# Patient Record
Sex: MALE | Race: WHITE | Employment: OTHER | URBAN - METROPOLITAN AREA
[De-identification: names, ages, dates, MRNs, and addresses within clinical notes are randomized per-mention and may not be internally consistent; named-entity substitution may affect disease eponyms.]

---

## 2017-01-09 ENCOUNTER — OFFICE VISIT (OUTPATIENT)
Dept: ORTHOPEDIC SURGERY | Age: 77
End: 2017-01-09

## 2017-01-09 VITALS — RESPIRATION RATE: 16 BRPM | HEIGHT: 73 IN | WEIGHT: 180 LBS | BODY MASS INDEX: 23.86 KG/M2

## 2017-01-09 DIAGNOSIS — S52.501A: Primary | ICD-10-CM

## 2017-01-09 DIAGNOSIS — R52 PAIN: ICD-10-CM

## 2017-01-09 PROCEDURE — 73110 X-RAY EXAM OF WRIST: CPT | Performed by: ORTHOPAEDIC SURGERY

## 2017-01-09 PROCEDURE — 99024 POSTOP FOLLOW-UP VISIT: CPT | Performed by: ORTHOPAEDIC SURGERY

## 2017-01-09 RX ORDER — AZITHROMYCIN 250 MG/1
TABLET, FILM COATED ORAL
COMMUNITY
Start: 2017-01-06 | End: 2017-02-22 | Stop reason: ALTCHOICE

## 2017-01-11 ENCOUNTER — HOSPITAL ENCOUNTER (OUTPATIENT)
Dept: OCCUPATIONAL THERAPY | Age: 77
Discharge: HOME | End: 2017-01-31
Attending: ORTHOPAEDIC SURGERY | Admitting: ORTHOPAEDIC SURGERY

## 2017-01-13 ENCOUNTER — HOSPITAL ENCOUNTER (OUTPATIENT)
Dept: OCCUPATIONAL THERAPY | Age: 77
Discharge: HOME | End: 2017-01-13
Admitting: ORTHOPAEDIC SURGERY

## 2017-01-16 ENCOUNTER — HOSPITAL ENCOUNTER (OUTPATIENT)
Dept: OCCUPATIONAL THERAPY | Age: 77
Discharge: HOME | End: 2017-01-16
Admitting: ORTHOPAEDIC SURGERY

## 2017-01-20 ENCOUNTER — HOSPITAL ENCOUNTER (OUTPATIENT)
Dept: OCCUPATIONAL THERAPY | Age: 77
Discharge: HOME | End: 2017-01-20
Admitting: ORTHOPAEDIC SURGERY

## 2017-01-24 ENCOUNTER — HOSPITAL ENCOUNTER (OUTPATIENT)
Dept: OCCUPATIONAL THERAPY | Age: 77
Discharge: HOME | End: 2017-01-24
Admitting: ORTHOPAEDIC SURGERY

## 2017-01-27 ENCOUNTER — HOSPITAL ENCOUNTER (OUTPATIENT)
Dept: OCCUPATIONAL THERAPY | Age: 77
Discharge: HOME | End: 2017-01-27
Admitting: ORTHOPAEDIC SURGERY

## 2017-01-30 ENCOUNTER — HOSPITAL ENCOUNTER (OUTPATIENT)
Dept: OCCUPATIONAL THERAPY | Age: 77
Discharge: HOME | End: 2017-01-30
Admitting: ORTHOPAEDIC SURGERY

## 2017-02-01 ENCOUNTER — HOSPITAL ENCOUNTER (OUTPATIENT)
Dept: OTHER | Age: 77
Discharge: HOME | End: 2017-02-28
Attending: ORTHOPAEDIC SURGERY | Admitting: ORTHOPAEDIC SURGERY

## 2017-02-02 ENCOUNTER — HOSPITAL ENCOUNTER (OUTPATIENT)
Dept: OCCUPATIONAL THERAPY | Age: 77
Discharge: HOME | End: 2017-02-02
Admitting: ORTHOPAEDIC SURGERY

## 2017-02-06 ENCOUNTER — HOSPITAL ENCOUNTER (OUTPATIENT)
Dept: OCCUPATIONAL THERAPY | Age: 77
Discharge: HOME | End: 2017-02-06
Admitting: ORTHOPAEDIC SURGERY

## 2017-02-10 ENCOUNTER — HOSPITAL ENCOUNTER (OUTPATIENT)
Dept: OCCUPATIONAL THERAPY | Age: 77
Discharge: HOME | End: 2017-02-10
Admitting: ORTHOPAEDIC SURGERY

## 2017-02-13 ENCOUNTER — HOSPITAL ENCOUNTER (OUTPATIENT)
Dept: OCCUPATIONAL THERAPY | Age: 77
Discharge: HOME | End: 2017-02-13
Admitting: ORTHOPAEDIC SURGERY

## 2017-02-17 ENCOUNTER — HOSPITAL ENCOUNTER (OUTPATIENT)
Dept: OCCUPATIONAL THERAPY | Age: 77
Discharge: HOME | End: 2017-02-17
Admitting: ORTHOPAEDIC SURGERY

## 2017-02-21 ENCOUNTER — HOSPITAL ENCOUNTER (OUTPATIENT)
Dept: OCCUPATIONAL THERAPY | Age: 77
Discharge: HOME | End: 2017-02-21
Admitting: ORTHOPAEDIC SURGERY

## 2017-02-22 ENCOUNTER — HOSPITAL ENCOUNTER (OUTPATIENT)
Dept: GENERAL RADIOLOGY | Age: 77
Discharge: HOME | End: 2017-02-22
Attending: ORTHOPAEDIC SURGERY | Admitting: ORTHOPAEDIC SURGERY

## 2017-02-22 ENCOUNTER — OFFICE VISIT (OUTPATIENT)
Dept: ORTHOPEDIC SURGERY | Age: 77
End: 2017-02-22

## 2017-02-22 VITALS — WEIGHT: 180 LBS | RESPIRATION RATE: 16 BRPM | BODY MASS INDEX: 23.86 KG/M2 | HEIGHT: 73 IN

## 2017-02-22 DIAGNOSIS — S52.501A: Primary | ICD-10-CM

## 2017-02-22 DIAGNOSIS — R52 PAIN: ICD-10-CM

## 2017-02-22 PROCEDURE — 99024 POSTOP FOLLOW-UP VISIT: CPT | Performed by: ORTHOPAEDIC SURGERY

## 2017-02-22 RX ORDER — PREDNISONE 1 MG/1
5 TABLET ORAL DAILY
COMMUNITY
Start: 2017-02-17

## 2017-03-01 ENCOUNTER — HOSPITAL ENCOUNTER (OUTPATIENT)
Dept: OTHER | Age: 77
Discharge: HOME | End: 2017-03-31
Attending: ORTHOPAEDIC SURGERY | Admitting: ORTHOPAEDIC SURGERY

## 2017-07-27 ENCOUNTER — HOSPITAL ENCOUNTER (OUTPATIENT)
Dept: NEUROLOGY | Age: 77
Discharge: HOME | End: 2017-07-27
Attending: PHYSICIAN ASSISTANT | Admitting: PHYSICIAN ASSISTANT

## 2017-11-30 ENCOUNTER — HOSPITAL ENCOUNTER (OUTPATIENT)
Dept: OTHER | Age: 77
Discharge: HOME | End: 2017-11-30
Attending: CLINIC/CENTER | Admitting: CLINIC/CENTER

## 2017-12-03 LAB
CULTURE: NORMAL
REPORT STATUS: NORMAL
REQUEST PROBLEM: NORMAL
SPECIMEN: NORMAL

## 2018-01-31 ENCOUNTER — OFFICE VISIT (OUTPATIENT)
Dept: SURGERY | Age: 78
End: 2018-01-31

## 2018-01-31 VITALS
BODY MASS INDEX: 26.52 KG/M2 | SYSTOLIC BLOOD PRESSURE: 130 MMHG | RESPIRATION RATE: 16 BRPM | WEIGHT: 201 LBS | OXYGEN SATURATION: 95 % | DIASTOLIC BLOOD PRESSURE: 80 MMHG | TEMPERATURE: 98.5 F | HEART RATE: 62 BPM

## 2018-01-31 DIAGNOSIS — K40.31 RECURRENT INGUINAL HERNIA OF RIGHT SIDE WITH OBSTRUCTION AND WITHOUT GANGRENE: Primary | ICD-10-CM

## 2018-01-31 PROCEDURE — 99204 OFFICE O/P NEW MOD 45 MIN: CPT | Performed by: SURGERY

## 2018-01-31 ASSESSMENT — ENCOUNTER SYMPTOMS
SHORTNESS OF BREATH: 0
COLOR CHANGE: 0
VOICE CHANGE: 0
ABDOMINAL PAIN: 0
WHEEZING: 0
BLOOD IN STOOL: 0
CONSTIPATION: 0
VOMITING: 0
ABDOMINAL DISTENTION: 0
RECTAL PAIN: 0
ANAL BLEEDING: 0
NAUSEA: 0
DIARRHEA: 0
COUGH: 0

## 2018-01-31 NOTE — PROGRESS NOTES
Jared Seymour   YOB: 1940    Date of Visit:  1/31/2018    Referring provider:    Pritesh Caldwell MD    Chief Complaint   Patient presents with    Surgical Consult     [place]       HPI:   Hernia: Patient is 65y.o. year old male seen at request of Pritesh Caldwell MD.  Patient presents for evaluation of  right inguinal hernia. Symptoms were first noted 6 weeks ago. Pain is sharp. Lump is reducible. Pt has no symptoms of  chronic constipation, chronic cough, difficulty urinating. Pt. does not have previous history of prior hernia surgery. Patient has a history of a laparoscopic right inguinal hernia repair with mesh 12-15 years ago. No Known Allergies  Outpatient Prescriptions Marked as Taking for the 1/31/18 encounter (Office Visit) with Macario Yousif,    Medication Sig Dispense Refill    predniSONE (DELTASONE) 5 MG tablet       finasteride (PROSCAR) 5 MG tablet       tamsulosin (FLOMAX) 0.4 MG capsule       timolol (TIMOPTIC) 0.5 % ophthalmic solution       amLODIPine (NORVASC) 5 MG tablet Take 1 tablet by mouth daily. 30 tablet 11    warfarin (COUMADIN) 1 MG tablet 5mg alternate with 7.5 mg 30 tablet 11    allopurinol (ZYLOPRIM) 100 MG tablet Take 100 mg by mouth daily.  celecoxib (CELEBREX) 100 MG capsule Take 100 mg by mouth 2 times daily.  albuterol (PROVENTIL HFA;VENTOLIN HFA) 108 (90 BASE) MCG/ACT inhaler Inhale 2 puffs into the lungs every 6 hours as needed.  pravastatin (PRAVACHOL) 10 MG tablet Take 10 mg by mouth daily. Past Medical History:   Diagnosis Date    DVT (deep venous thrombosis) (HCC)     Gout     Hypertension      Past Surgical History:   Procedure Laterality Date    ABDOMEN SURGERY      HERNIA REPAIR       History reviewed. No pertinent family history.   Social History     Social History    Marital status:      Spouse name: N/A    Number of children: N/A    Years of education: N/A     Occupational History   

## 2018-02-21 ENCOUNTER — TELEPHONE (OUTPATIENT)
Dept: SURGERY | Age: 78
End: 2018-02-21

## 2018-02-21 NOTE — ANESTHESIA PRE-OP
Inguinal Hernia Repair per Dr. Severo Clark. 1.  HTN, HLD, VHD: mild MR. EKG: SB, HR 57. Hx right leg DVT,1980's, 2002. Evaluated by Hematology. On coumadin, last dose 2/21/2018. Echo 2012, EF 55%. Denies CP or SOB. Walks 5 miles per day. 2.  Former smoker 1 ppd x 29 years, quit 1984. Able to lie flat. 3.  Controlled reflux, on PPI. 4.  BPH, slow urine stream, on flomax and proscar. 5.  Hx anemia. HGB: 13.0, HCT: 39.3. 6.  Gout. On allopurinol. Left CTR, 2001. Fall, with closed traumatic nondisplaced fracture of distal end of right radius, 1/2016. Osteoarthritis right wrist.    7.  Right hernia repair with mesh, 2000's. Recurrent right hernia. 8.  Rheumatoid arthritis 2015. On plaquenil, celebrex, and steroid. Followed by Dr. Charlee Keane. Anesthesia Evaluation will follow by a certified practitioner prior to surgery.     Electronically signed by Tiff Lowe NP on 2/21/2018 at 9:04 AM

## 2018-02-21 NOTE — TELEPHONE ENCOUNTER
Spoke with Gilberto at [facility] whom stated that no Pre-cert is required for procedure [phone] scheduled for 2/28 at [address] [place] [address].  Ref#[id number]

## 2018-02-22 ENCOUNTER — HOSPITAL ENCOUNTER (OUTPATIENT)
Dept: PREADMISSION TESTING | Age: 78
Discharge: HOME | End: 2018-02-22
Attending: SURGERY | Admitting: SURGERY

## 2018-02-22 VITALS
WEIGHT: 200 LBS | HEIGHT: 71 IN | BODY MASS INDEX: 28 KG/M2 | TEMPERATURE: 97.8 F | DIASTOLIC BLOOD PRESSURE: 71 MMHG | HEART RATE: 62 BPM | RESPIRATION RATE: 16 BRPM | OXYGEN SATURATION: 96 % | SYSTOLIC BLOOD PRESSURE: 139 MMHG

## 2018-02-22 LAB
ANION GAP SERPL CALCULATED.3IONS-SCNC: 11 MMOL/L (ref 4–16)
BUN BLDV-MCNC: 16 MG/DL (ref 6–23)
CALCIUM SERPL-MCNC: 9.1 MG/DL (ref 8.3–10.6)
CHLORIDE BLD-SCNC: 98 MMOL/L (ref 99–110)
CO2: 26 MMOL/L (ref 21–32)
CREAT SERPL-MCNC: 0.8 MG/DL (ref 0.9–1.3)
EKG ATRIAL RATE: 57 BPM
EKG DIAGNOSIS: NORMAL
EKG P AXIS: 48 DEGREES
EKG P-R INTERVAL: 186 MS
EKG Q-T INTERVAL: 416 MS
EKG QRS DURATION: 94 MS
EKG QTC CALCULATION (BAZETT): 404 MS
EKG R AXIS: 30 DEGREES
EKG T AXIS: -8 DEGREES
EKG VENTRICULAR RATE: 57 BPM
GFR AFRICAN AMERICAN: >60 ML/MIN/1.73M2
GFR NON-AFRICAN AMERICAN: >60 ML/MIN/1.73M2
GLUCOSE BLD-MCNC: 88 MG/DL (ref 70–99)
HCT VFR BLD CALC: 39.3 % (ref 42–52)
HEMOGLOBIN: 13 GM/DL (ref 13.5–18)
MCH RBC QN AUTO: 33 PG (ref 27–31)
MCHC RBC AUTO-ENTMCNC: 33.1 % (ref 32–36)
MCV RBC AUTO: 99.7 FL (ref 78–100)
PDW BLD-RTO: 13.6 % (ref 11.7–14.9)
PLATELET # BLD: 218 K/CU MM (ref 140–440)
PMV BLD AUTO: 9.7 FL (ref 7.5–11.1)
POTASSIUM SERPL-SCNC: 4.5 MMOL/L (ref 3.5–5.1)
RBC # BLD: 3.94 M/CU MM (ref 4.6–6.2)
SODIUM BLD-SCNC: 135 MMOL/L (ref 135–145)
WBC # BLD: 6.2 K/CU MM (ref 4–10.5)

## 2018-02-22 RX ORDER — LANSOPRAZOLE 15 MG/1
15 CAPSULE, DELAYED RELEASE ORAL NIGHTLY
COMMUNITY
End: 2019-06-18

## 2018-02-22 RX ORDER — CELECOXIB 400 MG/1
400 CAPSULE ORAL NIGHTLY
COMMUNITY
End: 2019-06-27 | Stop reason: SDUPTHER

## 2018-02-22 RX ORDER — CEFAZOLIN SODIUM 2 G/100ML
2 INJECTION, SOLUTION INTRAVENOUS ONCE
Status: CANCELLED | OUTPATIENT
Start: 2018-02-28

## 2018-02-28 PROBLEM — K40.91 RECURRENT INGUINAL HERNIA OF RIGHT SIDE WITHOUT OBSTRUCTION OR GANGRENE: Status: ACTIVE | Noted: 2018-02-28

## 2018-03-07 ENCOUNTER — OFFICE VISIT (OUTPATIENT)
Dept: SURGERY | Age: 78
End: 2018-03-07

## 2018-03-07 VITALS
SYSTOLIC BLOOD PRESSURE: 120 MMHG | RESPIRATION RATE: 19 BRPM | OXYGEN SATURATION: 97 % | HEART RATE: 77 BPM | DIASTOLIC BLOOD PRESSURE: 70 MMHG

## 2018-03-07 DIAGNOSIS — Z87.19 S/P INGUINAL HERNIA REPAIR: Primary | ICD-10-CM

## 2018-03-07 DIAGNOSIS — Z98.890 S/P INGUINAL HERNIA REPAIR: Primary | ICD-10-CM

## 2018-03-07 PROCEDURE — 99024 POSTOP FOLLOW-UP VISIT: CPT | Performed by: SURGERY

## 2018-03-27 ENCOUNTER — OFFICE VISIT (OUTPATIENT)
Dept: SURGERY | Age: 78
End: 2018-03-27

## 2018-03-27 VITALS
SYSTOLIC BLOOD PRESSURE: 132 MMHG | WEIGHT: 202.2 LBS | BODY MASS INDEX: 28.2 KG/M2 | OXYGEN SATURATION: 92 % | DIASTOLIC BLOOD PRESSURE: 76 MMHG | RESPIRATION RATE: 18 BRPM | HEART RATE: 72 BPM

## 2018-03-27 DIAGNOSIS — Z87.19 S/P INGUINAL HERNIA REPAIR: Primary | ICD-10-CM

## 2018-03-27 DIAGNOSIS — Z09 POSTOP CHECK: ICD-10-CM

## 2018-03-27 DIAGNOSIS — Z98.890 S/P INGUINAL HERNIA REPAIR: Primary | ICD-10-CM

## 2018-03-27 PROCEDURE — 99024 POSTOP FOLLOW-UP VISIT: CPT | Performed by: SURGERY

## 2018-03-27 NOTE — PROGRESS NOTES
Paula Palomino is 3 weeks post-op: right inguinal hernia. Presenting for routine follow-up. S:  Doing well. Patient has noted no excessive redness and swelling. O:  Wound healing well. A:  Satisfactory course. P:  Patient to return  prn. Patient is to return as needed for redness, swelling, discomfort, or any concern about his  surgery.

## 2018-05-15 PROBLEM — J18.9 PNEUMONIA: Status: ACTIVE | Noted: 2018-05-15

## 2018-05-24 ENCOUNTER — HOSPITAL ENCOUNTER (OUTPATIENT)
Dept: GENERAL RADIOLOGY | Age: 78
Discharge: HOME | End: 2018-05-24
Attending: PHYSICIAN ASSISTANT | Admitting: PHYSICIAN ASSISTANT

## 2018-05-24 DIAGNOSIS — J18.9 PNEUMONIA DUE TO INFECTIOUS ORGANISM, UNSPECIFIED LATERALITY, UNSPECIFIED PART OF LUNG: ICD-10-CM

## 2019-04-08 ENCOUNTER — APPOINTMENT (OUTPATIENT)
Dept: CT IMAGING | Age: 79
End: 2019-04-08
Payer: MEDICARE

## 2019-04-08 ENCOUNTER — APPOINTMENT (OUTPATIENT)
Dept: GENERAL RADIOLOGY | Age: 79
End: 2019-04-08
Payer: MEDICARE

## 2019-04-08 ENCOUNTER — HOSPITAL ENCOUNTER (EMERGENCY)
Age: 79
Discharge: HOME | End: 2019-04-08
Attending: EMERGENCY MEDICINE
Payer: MEDICARE

## 2019-04-08 VITALS
DIASTOLIC BLOOD PRESSURE: 85 MMHG | TEMPERATURE: 97.7 F | HEART RATE: 75 BPM | HEIGHT: 71 IN | RESPIRATION RATE: 18 BRPM | BODY MASS INDEX: 28.56 KG/M2 | WEIGHT: 204 LBS | SYSTOLIC BLOOD PRESSURE: 178 MMHG | OXYGEN SATURATION: 95 %

## 2019-04-08 DIAGNOSIS — R55 SYNCOPE AND COLLAPSE: Primary | ICD-10-CM

## 2019-04-08 LAB
ALBUMIN SERPL-MCNC: 3.6 GM/DL (ref 3.4–5)
ALP BLD-CCNC: 44 IU/L (ref 40–129)
ALT SERPL-CCNC: 14 U/L (ref 10–40)
ANION GAP SERPL CALCULATED.3IONS-SCNC: 10 MMOL/L (ref 4–16)
APTT: 34.1 SECONDS (ref 21.2–33)
AST SERPL-CCNC: 17 IU/L (ref 15–37)
BASOPHILS ABSOLUTE: 0 K/CU MM
BASOPHILS RELATIVE PERCENT: 0.6 % (ref 0–1)
BILIRUB SERPL-MCNC: 0.4 MG/DL (ref 0–1)
BUN BLDV-MCNC: 18 MG/DL (ref 6–23)
CALCIUM SERPL-MCNC: 8.3 MG/DL (ref 8.3–10.6)
CHLORIDE BLD-SCNC: 95 MMOL/L (ref 99–110)
CO2: 26 MMOL/L (ref 21–32)
CREAT SERPL-MCNC: 0.9 MG/DL (ref 0.9–1.3)
DIFFERENTIAL TYPE: ABNORMAL
EOSINOPHILS ABSOLUTE: 0.1 K/CU MM
EOSINOPHILS RELATIVE PERCENT: 1.1 % (ref 0–3)
GFR AFRICAN AMERICAN: >60 ML/MIN/1.73M2
GFR NON-AFRICAN AMERICAN: >60 ML/MIN/1.73M2
GLUCOSE BLD-MCNC: 119 MG/DL (ref 70–99)
HCT VFR BLD CALC: 37.6 % (ref 42–52)
HEMOGLOBIN: 12.1 GM/DL (ref 13.5–18)
IMMATURE NEUTROPHIL %: 0.3 % (ref 0–0.43)
INR BLD: 1.63 INDEX
LYMPHOCYTES ABSOLUTE: 1.1 K/CU MM
LYMPHOCYTES RELATIVE PERCENT: 16.5 % (ref 24–44)
MCH RBC QN AUTO: 33.3 PG (ref 27–31)
MCHC RBC AUTO-ENTMCNC: 32.2 % (ref 32–36)
MCV RBC AUTO: 103.6 FL (ref 78–100)
MONOCYTES ABSOLUTE: 0.8 K/CU MM
MONOCYTES RELATIVE PERCENT: 12.2 % (ref 0–4)
NUCLEATED RBC %: 0 %
PDW BLD-RTO: 12.6 % (ref 11.7–14.9)
PLATELET # BLD: 201 K/CU MM (ref 140–440)
PMV BLD AUTO: 10.1 FL (ref 7.5–11.1)
POTASSIUM SERPL-SCNC: 4.4 MMOL/L (ref 3.5–5.1)
PROTHROMBIN TIME: 18.8 SECONDS (ref 9.12–12.5)
RBC # BLD: 3.63 M/CU MM (ref 4.6–6.2)
SEGMENTED NEUTROPHILS ABSOLUTE COUNT: 4.6 K/CU MM
SEGMENTED NEUTROPHILS RELATIVE PERCENT: 69.3 % (ref 36–66)
SODIUM BLD-SCNC: 131 MMOL/L (ref 135–145)
TOTAL IMMATURE NEUTOROPHIL: 0.02 K/CU MM
TOTAL NUCLEATED RBC: 0 K/CU MM
TOTAL PROTEIN: 7.2 GM/DL (ref 6.4–8.2)
TROPONIN T: <0.01 NG/ML
WBC # BLD: 6.7 K/CU MM (ref 4–10.5)

## 2019-04-08 PROCEDURE — 99285 EMERGENCY DEPT VISIT HI MDM: CPT

## 2019-04-08 PROCEDURE — 85025 COMPLETE CBC W/AUTO DIFF WBC: CPT

## 2019-04-08 PROCEDURE — 2580000003 HC RX 258: Performed by: EMERGENCY MEDICINE

## 2019-04-08 PROCEDURE — 71045 X-RAY EXAM CHEST 1 VIEW: CPT

## 2019-04-08 PROCEDURE — 80053 COMPREHEN METABOLIC PANEL: CPT

## 2019-04-08 PROCEDURE — 96360 HYDRATION IV INFUSION INIT: CPT

## 2019-04-08 PROCEDURE — 85610 PROTHROMBIN TIME: CPT

## 2019-04-08 PROCEDURE — 93005 ELECTROCARDIOGRAM TRACING: CPT | Performed by: EMERGENCY MEDICINE

## 2019-04-08 PROCEDURE — 6370000000 HC RX 637 (ALT 250 FOR IP): Performed by: EMERGENCY MEDICINE

## 2019-04-08 PROCEDURE — 73030 X-RAY EXAM OF SHOULDER: CPT

## 2019-04-08 PROCEDURE — 70450 CT HEAD/BRAIN W/O DYE: CPT

## 2019-04-08 PROCEDURE — 85730 THROMBOPLASTIN TIME PARTIAL: CPT

## 2019-04-08 PROCEDURE — 84484 ASSAY OF TROPONIN QUANT: CPT

## 2019-04-08 RX ORDER — ALFUZOSIN HYDROCHLORIDE 10 MG/1
10 TABLET, EXTENDED RELEASE ORAL DAILY
COMMUNITY
End: 2020-09-18

## 2019-04-08 RX ORDER — 0.9 % SODIUM CHLORIDE 0.9 %
500 INTRAVENOUS SOLUTION INTRAVENOUS ONCE
Status: COMPLETED | OUTPATIENT
Start: 2019-04-08 | End: 2019-04-08

## 2019-04-08 RX ORDER — OXYCODONE HYDROCHLORIDE AND ACETAMINOPHEN 5; 325 MG/1; MG/1
1 TABLET ORAL ONCE
Status: COMPLETED | OUTPATIENT
Start: 2019-04-08 | End: 2019-04-08

## 2019-04-08 RX ORDER — LISINOPRIL 2.5 MG/1
2.5 TABLET ORAL DAILY
COMMUNITY
End: 2019-04-23

## 2019-04-08 RX ORDER — AMLODIPINE BESYLATE 5 MG/1
5 TABLET ORAL DAILY
COMMUNITY
End: 2019-06-27 | Stop reason: SDUPTHER

## 2019-04-08 RX ADMIN — SODIUM CHLORIDE 500 ML: 9 INJECTION, SOLUTION INTRAVENOUS at 16:35

## 2019-04-08 RX ADMIN — OXYCODONE AND ACETAMINOPHEN 1 TABLET: 5; 325 TABLET ORAL at 16:34

## 2019-04-08 ASSESSMENT — PAIN DESCRIPTION - PROGRESSION: CLINICAL_PROGRESSION: NOT CHANGED

## 2019-04-08 ASSESSMENT — PAIN DESCRIPTION - LOCATION: LOCATION: SHOULDER

## 2019-04-08 ASSESSMENT — PAIN DESCRIPTION - ONSET: ONSET: AWAKENED FROM SLEEP

## 2019-04-08 ASSESSMENT — PAIN SCALES - GENERAL
PAINLEVEL_OUTOF10: 4
PAINLEVEL_OUTOF10: 2

## 2019-04-08 ASSESSMENT — PAIN DESCRIPTION - FREQUENCY: FREQUENCY: CONTINUOUS

## 2019-04-08 ASSESSMENT — PAIN DESCRIPTION - ORIENTATION: ORIENTATION: RIGHT

## 2019-04-08 ASSESSMENT — PAIN DESCRIPTION - DESCRIPTORS: DESCRIPTORS: ACHING

## 2019-04-08 ASSESSMENT — PAIN DESCRIPTION - PAIN TYPE: TYPE: ACUTE PAIN

## 2019-04-08 NOTE — PROGRESS NOTES
medication  Amlodipine new medication  Hydroxychloroquine frequency changed to qd from bid  Prednisone dose changed to 5 mg hs from 2.5 mg hs    Medications removed from list (include reason, ex. noncompliance, medication cost, therapy complete etc.):   Docusate no longer taking  Flomax no longer taking    Other Comments:  Reviewed and updated med list per patient and verified with claims and retail pharmacy  Patient states he takes allopurinol qd however retail pharmacy has no claims and patient does not dose strength of medication  Per patient flomax was d/c'd last week    To my knowledge the above medication history is accurate as of 4/8/2019 4:29 PM.   Anabel Doss CPhT   4/8/2019 4:29 PM

## 2019-04-08 NOTE — ED PROVIDER NOTES
Triage Chief Complaint:   Loss of Consciousness (Patient was at [facility] having a BM when he had a syncopal episode. Patient was not straining. He has diarrhea. Patient did not hit head. He landed on R shoulder. Patient takes 5 mg of Warfarin. Patient slightly lighheaded. VS stable per EMS. ) and Shoulder Injury (Patient's R shoulder injured from fall off toilet. Pain 2/10 and dull. Good ROM. Good PMS. )    [place]:  Coco Pena is a 77 y.o. male that presents to the emergency department for syncopal episode. Patient reports that he had an urge to have a bowel movement while at [facility]. While sitting on toilet he had a syncopal episode falling on his right shoulder unknown if he hit his head. Currently complaining of lightheadedness and right shoulder pain. Reports that he had one episode of loose stool prior to arrival now endorsing cc about median. Denies any abdominal pain chest pain or shortness of breath. On review systems, he has  no other complaints.     ROS:  General:  No fevers, no chills, no weakness  Eyes:  No recent vison changes, no discharge  ENT:  No sore throat, no nasal congestion, no hearing changes  Cardiovascular:  No chest pain, no palpitations  Respiratory:  No shortness of breath, no cough, no wheezing  Gastrointestinal:  No pain, no nausea, no vomiting, +diarrhea  Musculoskeletal:  No muscle pain, no joint pain  Skin:  No rash, no pruritis, no easy bruising  Neurologic:  No speech problems, no headache, no extremity numbness, no extremity tingling, no extremity weakness  Psychiatric:  No anxiety  Genitourinary:  No dysuria, no hematuria  Endocrine:  No unexpected weight gain, no unexpected weight loss  Extremities:  no edema, +pain    Past Medical History:   Diagnosis Date    Acid reflux     Arthritis     \"Wrists\"    DVT (deep venous thrombosis) (HCC)     Right Leg In Late 1980's, Right Leg In 2002    Enlarged prostate     Glaucoma     Bilateral Eyes    Gout     [place] (hard of file     Active member of club or organization: Not on file     Attends meetings of clubs or organizations: Not on file     Relationship status: Not on file    Intimate partner violence:     Fear of current or ex partner: Not on file     Emotionally abused: Not on file     Physically abused: Not on file     Forced sexual activity: Not on file   Other Topics Concern    Not on file   Social History Narrative    Not on file     No current facility-administered medications for this encounter. Current Outpatient Medications   Medication Sig Dispense Refill    alfuzosin (UROXATRAL) 10 MG extended release tablet Take 10 mg by mouth daily      lisinopril (PRINIVIL;ZESTRIL) 2.5 MG tablet Take 2.5 mg by mouth daily      amLODIPine (NORVASC) 5 MG tablet Take 5 mg by mouth daily      warfarin (COUMADIN) 5 MG tablet Take 5 mg by mouth nightly      ADVAIR DISKUS 250-50 MCG/DOSE AEPB Inhale 2 puffs into the lungs daily       celecoxib (CELEBREX) 400 MG capsule Take 400 mg by mouth nightly      lansoprazole (PREVACID) 15 MG delayed release capsule Take 15 mg by mouth nightly Over The Counter      predniSONE (DELTASONE) 5 MG tablet Take 5 mg by mouth daily       finasteride (PROSCAR) 5 MG tablet Take 5 mg by mouth nightly       hydroxychloroquine (PLAQUENIL) 200 MG tablet Take 200 mg by mouth daily       latanoprost (XALATAN) 0.005 % ophthalmic solution Place 1 drop into both eyes nightly       timolol (TIMOPTIC) 0.5 % ophthalmic solution Place 1 drop into both eyes every morning        No Known Allergies    Nursing Notes Reviewed    Physical Exam:  ED Triage Vitals   Enc Vitals Group      BP       Pulse       Resp       Temp       Temp src       SpO2       Weight       Height       Head Circumference       Peak Flow       Pain Score       Pain Loc       Pain Edu? Excl. in [address]? My pulse ox interpretation is - normal    General appearance:  No acute distress. Skin:  Warm. Dry. SKIN intact.   Eye: Extraocular movements intact. Ears, nose, mouth and throat:  Oral mucosa moist   Neck:  Trachea midline. Extremity:  Right upper  Shoulder tenderness . Limited range of motion secondary to pain. Palpable. Sensation intact bilaterally. Heart:  Regular rate and rhythm, normal S1 & S2, no extra heart sounds. Perfusion:  intact  Respiratory:  Lungs clear to auscultation bilaterally. Respirations nonlabored. Abdominal:  Normal bowel sounds. Soft. Nontender. Non distended. Back:  No CVA tenderness to palpation     NEUROLOGICAL: Awake and alert. GCS 15. Cranial nerves 2-12 grossly intact. Strength 5/5 throughout other extermities. Light touch sensation intact throughout. Finger to nose WNL.            Psychiatric:  Appropriate    I have reviewed and interpreted all of the currently available lab results from this visit (if applicable):  Results for orders placed or performed during the hospital encounter of 04/08/19   CBC Auto Differential   Result Value Ref Range    WBC 6.7 4.0 - 10.5 K/CU MM    RBC 3.63 (L) 4.6 - 6.2 M/CU MM    Hemoglobin 12.1 (L) 13.5 - 18.0 GM/DL    Hematocrit 37.6 (L) 42 - 52 %    .6 (H) 78 - 100 FL    MCH 33.3 (H) 27 - 31 PG    MCHC 32.2 32.0 - 36.0 %    RDW 12.6 11.7 - 14.9 %    Platelets 402 775 - 976 K/CU MM    MPV 10.1 7.5 - 11.1 FL    Differential Type AUTOMATED DIFFERENTIAL     Segs Relative 69.3 (H) 36 - 66 %    Lymphocytes % 16.5 (L) 24 - 44 %    Monocytes % 12.2 (H) 0 - 4 %    Eosinophils % 1.1 0 - 3 %    Basophils % 0.6 0 - 1 %    Segs Absolute 4.6 K/CU MM    Lymphocytes # 1.1 K/CU MM    Monocytes # 0.8 K/CU MM    Eosinophils # 0.1 K/CU MM    Basophils # 0.0 K/CU MM    Nucleated RBC % 0.0 %    Total Nucleated RBC 0.0 K/CU MM    Total Immature Neutrophil 0.02 K/CU MM    Immature Neutrophil % 0.3 0 - 0.43 %   CMP   Result Value Ref Range    Sodium 131 (L) 135 - 145 MMOL/L    Potassium 4.4 3.5 - 5.1 MMOL/L    Chloride 95 (L) 99 - 110 mMol/L    CO2 26 21 - 32 MMOL/L BUN 18 6 - 23 MG/DL    CREATININE 0.9 0.9 - 1.3 MG/DL    Glucose 119 (H) 70 - 99 MG/DL    Calcium 8.3 8.3 - 10.6 MG/DL    Alb 3.6 3.4 - 5.0 GM/DL    Total Protein 7.2 6.4 - 8.2 GM/DL    Total Bilirubin 0.4 0.0 - 1.0 MG/DL    ALT 14 10 - 40 U/L    AST 17 15 - 37 IU/L    Alkaline Phosphatase 44 40 - 129 IU/L    GFR Non-African American >60 >60 mL/min/1.73m2    GFR African American >60 >60 mL/min/1.73m2    Anion Gap 10 4 - 16   Troponin   Result Value Ref Range    Troponin T <0.010 <0.01 NG/ML   Protime/INR & PTT   Result Value Ref Range    Protime 18.8 (H) 9.12 - 12.5 SECONDS    INR 1.63 INDEX    aPTT 34.1 (H) 21.2 - 33.0 SECONDS   EKG 12 Lead   Result Value Ref Range    Ventricular Rate 81 BPM    Atrial Rate 81 BPM    P-R Interval 190 ms    QRS Duration 90 ms    Q-T Interval 368 ms    QTc Calculation (Bazett) 427 ms    P Axis 42 degrees    R Axis 3 degrees    T Axis 3 degrees    Diagnosis       Normal sinus rhythm  Normal ECG  When compared with ECG of 15-MAY-2018 08:17,  No significant change was found  Confirmed by [facility] Rivas FONTAINE ([id number]) on 4/9/2019 12:06:23 PM        Radiographs (if obtained):  [] The following radiograph was interpreted by myself in the absence of a radiologist:   [] Radiologist's Report Reviewed:  XR CHEST PORTABLE   Final Result   No acute cardiopulmonary disease. XR SHOULDER RIGHT (MIN 2 VIEWS)   Final Result   Normal right shoulder alignment with mild acromioclavicular osteoarthritis. No acute osseous abnormality. 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 Further evaluation with MRI brain is recommended. Mild parenchymal volume loss. Mild chronic microvascular disease. EKG (if obtained):  12 lead EKG per my interpretation:  Normal Sinus Rhythm 81  Axis is   Normal  QTc is  within an acceptable range  There is no specific T wave changes appreciated.   There is no specific ST wave changes appreciated. Prior EKG to compare with was available 5/15/2018      Chart review shows recent radiographs:  No results found. MDM:  70-year-old male presenting with syncope following bowel movement. Patient otherwise well-appearing at bedside mentating well and in no acute distress. CT head and x-rays ordered to evaluate for intracranial pathology and fractures. Reviewed and noted to be within normal limits. Patient given oral medications and IV fluids for rehydration given his complaints of diarrhea. Given history and physical, likely vasovagal episode. He is reassessing the ED and had no other neurological  Complaints and was hemodynamically stable. Right shoulder reassessed following medications and had full range of motion. He was discharged home with follow-up with his primary care doctor. Clinical Impression:  1. Syncope and collapse      Disposition referral (if applicable):  Julio Jha MD  [place] [address]  [phone]          Disposition medications (if applicable):  Discharge Medication List as of 4/8/2019  5:04 PM          Comment: Please note this report has been produced using speech recognition software and may contain errors related to that system including errors in grammar, punctuation, and spelling, as well as words and phrases that may be inappropriate. If there are any questions or concerns please feel free to contact the dictating provider for clarification.        Thea Moreland MD  04/11/19 [id number]

## 2019-04-08 NOTE — ED NOTES
Patient came in via EMS from home for a syncopal episode. Patient was at [facility] having a BM when he had a syncopal episode. Patient was not straining. He has diarrhea. Patient did not hit head. He landed on R shoulder. Patient takes 5 mg of Warfarin. Patient slightly lighheaded. VS stable per EMS. Patient's R shoulder injured from fall off toilet. Pain 2/10 and dull. Good ROM. Good PMS.       Emiliano Tanner RN  04/08/19 0124

## 2019-04-09 PROCEDURE — 93010 ELECTROCARDIOGRAM REPORT: CPT | Performed by: INTERNAL MEDICINE

## 2019-04-18 LAB
EKG ATRIAL RATE: 81 BPM
EKG DIAGNOSIS: NORMAL
EKG P AXIS: 42 DEGREES
EKG P-R INTERVAL: 190 MS
EKG Q-T INTERVAL: 368 MS
EKG QRS DURATION: 90 MS
EKG QTC CALCULATION (BAZETT): 427 MS
EKG R AXIS: 3 DEGREES
EKG T AXIS: 3 DEGREES
EKG VENTRICULAR RATE: 81 BPM

## 2019-04-23 ENCOUNTER — INITIAL CONSULT (OUTPATIENT)
Dept: CARDIOLOGY CLINIC | Age: 79
End: 2019-04-23
Payer: MEDICARE

## 2019-04-23 VITALS
HEIGHT: 72 IN | SYSTOLIC BLOOD PRESSURE: 124 MMHG | BODY MASS INDEX: 26.17 KG/M2 | WEIGHT: 193.2 LBS | DIASTOLIC BLOOD PRESSURE: 80 MMHG | HEART RATE: 80 BPM

## 2019-04-23 DIAGNOSIS — R55 SYNCOPE, UNSPECIFIED SYNCOPE TYPE: Primary | ICD-10-CM

## 2019-04-23 PROCEDURE — G8419 CALC BMI OUT NRM PARAM NOF/U: HCPCS | Performed by: INTERNAL MEDICINE

## 2019-04-23 PROCEDURE — 99214 OFFICE O/P EST MOD 30 MIN: CPT | Performed by: INTERNAL MEDICINE

## 2019-04-23 PROCEDURE — G8427 DOCREV CUR MEDS BY ELIG CLIN: HCPCS | Performed by: INTERNAL MEDICINE

## 2019-04-23 NOTE — PROGRESS NOTES
CARDIOLOGY CONSULT NOTE   Reason for consultation:  Dizziness and syncope    Referring physician:  Dr. Annel Green  Primary care physician: Juan Sheldon MD      Dear Dr. Annel Green  Thanks for the consult. History of present illness:Mesfin is a 77 y. o.year old who  presents with dizziness and possible syncope, he was trying to sit on the commode and felt light headed and fell down, he felt he was awake and experience the fall and hitting his shoulder, he stayed on the floor and his wife called 911, few weeks ago, his prostate medications were changed, stopped gabapentin and off finesteride, he has been feeling light headed for few weeks, he also stopped coumadin few weeks go ,he used to be on coumadin for DVT. Patient feels dizziness which is getting worse, its intermittent,lasts for few seconds, for last 2-3 weeks, every other day, its associated with palpitations,it gets better with sitting with sudden movement,pateint is not sure if its from head movement or arm movement    Chief Complaint   Patient presents with    Loss of Consciousness    Hypertension     Blood pressure, cholesterol, blood glucose and weight are well controlled. Past medical history:    has a past medical history of Acid reflux, Arthritis, BPH (benign prostatic hyperplasia), COPD (chronic obstructive pulmonary disease) ([facility] [address] Dizziness, DVT (deep venous thrombosis) ([facility] [address] Enlarged prostate, Glaucoma, Gout, [place] (hard of hearing), Hx of blood clots, Hypertension, Impaired glucose tolerance, Slow urinary stream, Syncope, Teeth missing, and Wears glasses. Past surgical history:   has a past surgical history that includes eye surgery (Right, 1990's); eye surgery (Left, 2016); Dental surgery; Colonoscopy (2000's); Inguinal hernia repair (Right, 2000); Vasectomy (1984); Abdominal exploration surgery (1984); and Inguinal hernia repair (Right, 02/28/2018). Social History:   reports that he quit smoking about 35 years ago.  His smoking use included cigarettes and pipe. He started smoking about 64 years ago. He has a 29.00 pack-year smoking history. He has never used smokeless tobacco. He reports that he drinks about 1.2 - 1.8 oz of alcohol per week. He reports that he does not use drugs. Family history:   no family history of CAD, STROKE of DM    No Known Allergies      No current facility-administered medications for this visit. Current Outpatient Medications   Medication Sig Dispense Refill    alfuzosin (UROXATRAL) 10 MG extended release tablet Take 10 mg by mouth daily      amLODIPine (NORVASC) 5 MG tablet Take 5 mg by mouth daily      ADVAIR DISKUS 250-50 MCG/DOSE AEPB Inhale 2 puffs into the lungs daily       celecoxib (CELEBREX) 400 MG capsule Take 400 mg by mouth nightly      lansoprazole (PREVACID) 15 MG delayed release capsule Take 15 mg by mouth nightly Over The Counter      predniSONE (DELTASONE) 5 MG tablet Take 5 mg by mouth daily       hydroxychloroquine (PLAQUENIL) 200 MG tablet Take 200 mg by mouth daily       latanoprost (XALATAN) 0.005 % ophthalmic solution Place 1 drop into both eyes nightly       timolol (TIMOPTIC) 0.5 % ophthalmic solution Place 1 drop into both eyes every morning        No current facility-administered medications for this visit.       Review of Systems:   · Constitutional: No Fever or Weight Loss   · Eyes: No Decreased Vision  · ENT: No Headaches, Hearing Loss or Vertigo  · Cardiovascular: No chest pain, dyspnea on exertion, palpitations or loss of consciousness  · Respiratory: No cough or wheezing    · Gastrointestinal: No abdominal pain, appetite loss, blood in stools, constipation, diarrhea or heartburn  · Genitourinary: No dysuria, trouble voiding, or hematuria  · Musculoskeletal:  No gait disturbance, weakness or joint complaints  · Integumentary: No rash or pruritis  · Neurological: No TIA or stroke symptoms  · Psychiatric: No anxiety or depression  · Endocrine: No malaise, fatigue or temperature intolerance  · Hematologic/Lymphatic: No bleeding problems, blood clots or swollen lymph nodes  · Allergic/Immunologic: No nasal congestion or hives  All systems negative except as marked. ·   ·      Physical Examination:    Vitals:    04/23/19 1408   BP: 124/80   Pulse: 80    RR 14  AFEBRILE    Wt Readings from Last 3 Encounters:   04/23/19 193 lb 3.2 oz (87.6 kg)   04/08/19 204 lb (92.5 kg)   05/15/18 204 lb 7 oz (92.7 kg)     Body mass index is 26.2 kg/m². General Appearance:  No distress, conversant    Constitutional:  Well developed, Well nourished, No acute distress, Non-toxic appearance. HENT:  Normocephalic, Atraumatic, Bilateral external ears normal, Oropharynx moist, No oral exudates, Nose normal. Neck- Normal range of motion, No tenderness, Supple, No stridor,no apical-carotid delay, no carotid bruit  Eyes:  PERRL, EOMI, Conjunctiva normal, No discharge. Respiratory:  Normal breath sounds, No respiratory distress, No wheezing, No chest tenderness. ,no use of accessory muscles, diaphragm movement is normal  Cardiovascular: (PMI) apex non displaced,no lifts no thrills, no s3,no s4, Normal heart rate, Normal rhythm, No murmurs, No rubs, No gallops. Carotid arteries pulse and amplitude are normal no bruit, no abdominal bruit noted ( normal abdominal aorta ausculation), femoral arteries pulse and amplitude are normal no bruit, pedal pulses are normal  GI:  Bowel sounds normal, Soft, No tenderness, No masses, No pulsatile masses, no hepatosplenomegally, no bruits  : External genitalia appear normal, No masses or lesions. No discharge. No CVA tenderness. Musculoskeletal:  Intact distal pulses, No edema, No tenderness, No cyanosis, No clubbing. Good range of motion in all major joints. No tenderness to palpation or major deformities noted. Back- No tenderness. Integument:  Warm, Dry, No erythema, No rash. Skin: no rash, no ulcers  Lymphatic:  No lymphadenopathy noted.    Neurologic: Alert & oriented x 3, Normal motor function, Normal sensory function, No focal deficits noted. Psychiatric:  Affect normal, Judgment normal, Mood normal.   Lab Review   No results for input(s): WBC, HGB, HCT, PLT in the last 72 hours. No results for input(s): NA, K, CL, CO2, PHOS, BUN, CREATININE in the last 72 hours. Invalid input(s): CA  No results for input(s): AST, ALT, ALB, BILIDIR, BILITOT, ALKPHOS in the last 72 hours. No results for input(s): TROPONINI in the last 72 hours. Lab Results   Component Value Date    BNP 14 09/09/2012     Lab Results   Component Value Date    INR 1.63 04/08/2019    PROTIME 18.8 (H) 04/08/2019         EKG:NSR    Chest Xray:nad    ECHO:PENDING  Labs, echo, meds reviewed  Assessment: 77 y. o.year old with PMH of  has a past medical history of Acid reflux, Arthritis, BPH (benign prostatic hyperplasia), COPD (chronic obstructive pulmonary disease) ([facility] [address] Dizziness, DVT (deep venous thrombosis) ([facility] [address] Enlarged prostate, Glaucoma, Gout, [place] (hard of hearing), Hx of blood clots, Hypertension, Impaired glucose tolerance, Slow urinary stream, Syncope, Teeth missing, and Wears glasses. Recommendations:    1. Syncope and dizziness:tilt table test, echo stress test, carotid doppler ordered,holter monitor ordered, orthtostatic   2. COPD;stable  3. H/o DVT stable, off coumadin  4. BPH; stable, off finesteride  5. GERD: stable  6. Health maintenance: exerise and diet  All labs, medications and tests reviewed, continue all other medications of all above medical condition listed as is.          Carrington Huitron MD, 4/23/2019 2:32 PM

## 2019-04-25 ENCOUNTER — TELEPHONE (OUTPATIENT)
Dept: CARDIOLOGY CLINIC | Age: 79
End: 2019-04-25

## 2019-04-25 NOTE — TELEPHONE ENCOUNTER
I called pt to schedule ABILIO and pt states that Dr. Alfonso Davila told him he could wait until after his next OV to decide if he wants this test done.

## 2019-04-26 ENCOUNTER — TELEPHONE (OUTPATIENT)
Dept: CARDIOLOGY CLINIC | Age: 79
End: 2019-04-26

## 2019-04-26 NOTE — TELEPHONE ENCOUNTER
Patient called returning our call to set up ABILIO ECHO CAROTID , He stated that he told Dr Ute Chin he didn't want to have any of these   Tests done stated it was over kill

## 2019-04-29 ENCOUNTER — TELEPHONE (OUTPATIENT)
Dept: CARDIOLOGY CLINIC | Age: 79
End: 2019-04-29

## 2019-04-29 NOTE — TELEPHONE ENCOUNTER
Called patient to schedule ABILIO for Wed 5/8/19 @ 11 AM with Dr. Petros Holley. LM on VM for patient to call the office back to discuss. He will need to come in to sign consents and go over instructions/medications. Ins pre [employer] Mas has been approved. Patient also needs scheduled for Carotid, and Echo which are both approved. NM and Monitor are still pending.

## 2019-04-30 ENCOUNTER — TELEPHONE (OUTPATIENT)
Dept: CARDIOLOGY CLINIC | Age: 79
End: 2019-04-30

## 2019-04-30 NOTE — TELEPHONE ENCOUNTER
Called patient to schedule Echo and Carotid, no answer. Left message for patient to call office to schedule.  [facility] Medicare NO AUTH NEEDED OK TO SCHEDULE

## 2019-05-01 ENCOUNTER — TELEPHONE (OUTPATIENT)
Dept: CARDIOLOGY CLINIC | Age: 79
End: 2019-05-01

## 2019-05-01 NOTE — TELEPHONE ENCOUNTER
Called patient to schedule NM Stress, no answer . Left message for patient to call office to schedule.  [facility] Medicare AUTH# [id number] EXP 6/15/19 OK TO SCHEDULE

## 2019-05-02 NOTE — TELEPHONE ENCOUNTER
Spoke to patient this morning and he does not want any testing done at this time. States that he told Dr. Alfonso Davila he wanted to wait 30 days to see how he was doing and then follow up.

## 2019-06-07 ENCOUNTER — TELEPHONE (OUTPATIENT)
Dept: CARDIOLOGY CLINIC | Age: 79
End: 2019-06-07

## 2019-06-14 DIAGNOSIS — I10 ESSENTIAL HYPERTENSION: ICD-10-CM

## 2019-06-14 DIAGNOSIS — R73.02 IMPAIRED GLUCOSE TOLERANCE: ICD-10-CM

## 2019-06-14 DIAGNOSIS — J44.9 CHRONIC OBSTRUCTIVE PULMONARY DISEASE, UNSPECIFIED COPD TYPE (HCC): ICD-10-CM

## 2019-06-14 PROBLEM — I82.409 DVT (DEEP VENOUS THROMBOSIS) (HCC): Status: ACTIVE | Noted: 2019-06-14

## 2019-06-14 PROBLEM — N40.0 BENIGN PROSTATIC HYPERPLASIA: Status: ACTIVE | Noted: 2019-06-14

## 2019-06-14 RX ORDER — GABAPENTIN 100 MG/1
100 CAPSULE ORAL 3 TIMES DAILY
COMMUNITY
End: 2020-02-27 | Stop reason: ALTCHOICE

## 2019-06-14 RX ORDER — LANSOPRAZOLE 30 MG/1
30 CAPSULE, DELAYED RELEASE ORAL DAILY
COMMUNITY

## 2019-06-14 RX ORDER — FINASTERIDE 5 MG/1
5 TABLET, FILM COATED ORAL DAILY
COMMUNITY
End: 2019-06-27 | Stop reason: SDUPTHER

## 2019-06-14 RX ORDER — ALLOPURINOL 100 MG/1
100 TABLET ORAL DAILY
COMMUNITY
End: 2019-06-27 | Stop reason: SDUPTHER

## 2019-06-14 RX ORDER — LISINOPRIL 2.5 MG/1
2.5 TABLET ORAL DAILY
COMMUNITY
End: 2019-06-27 | Stop reason: SDUPTHER

## 2019-06-14 RX ORDER — WARFARIN SODIUM 5 MG/1
5 TABLET ORAL
COMMUNITY
End: 2019-06-18

## 2019-06-14 RX ORDER — TAMSULOSIN HYDROCHLORIDE 0.4 MG/1
0.4 CAPSULE ORAL DAILY
COMMUNITY
End: 2019-06-18

## 2019-06-18 ENCOUNTER — OFFICE VISIT (OUTPATIENT)
Dept: FAMILY MEDICINE CLINIC | Age: 79
End: 2019-06-18
Payer: MEDICARE

## 2019-06-18 VITALS
BODY MASS INDEX: 26.65 KG/M2 | HEIGHT: 71 IN | DIASTOLIC BLOOD PRESSURE: 80 MMHG | WEIGHT: 190.4 LBS | HEART RATE: 70 BPM | SYSTOLIC BLOOD PRESSURE: 126 MMHG

## 2019-06-18 DIAGNOSIS — H61.21 IMPACTED CERUMEN OF RIGHT EAR: Primary | ICD-10-CM

## 2019-06-18 PROCEDURE — 99212 OFFICE O/P EST SF 10 MIN: CPT | Performed by: PHYSICIAN ASSISTANT

## 2019-06-18 ASSESSMENT — PATIENT HEALTH QUESTIONNAIRE - PHQ9
1. LITTLE INTEREST OR PLEASURE IN DOING THINGS: 0
SUM OF ALL RESPONSES TO PHQ9 QUESTIONS 1 & 2: 0
SUM OF ALL RESPONSES TO PHQ QUESTIONS 1-9: 0
2. FEELING DOWN, DEPRESSED OR HOPELESS: 0
SUM OF ALL RESPONSES TO PHQ QUESTIONS 1-9: 0

## 2019-06-18 NOTE — PROGRESS NOTES
Pack years: 29.00     Types: Cigarettes, Pipe     Start date:      Last attempt to quit:      Years since quittin.4    Smokeless tobacco: Never Used   Substance and Sexual Activity    Alcohol use: Yes     Alcohol/week: 1.2 - 1.8 oz     Types: 2 - 3 Glasses of wine per week     Comment: \"2 Glasses Of Wine Daily\"    Drug use: No    Sexual activity: Not Currently   Lifestyle    Physical activity:     Days per week: None     Minutes per session: None    Stress: None   Relationships    Social connections:     Talks on phone: None     Gets together: None     Attends Hindu service: None     Active member of club or organization: None     Attends meetings of clubs or organizations: None     Relationship status: None    Intimate partner violence:     Fear of current or ex partner: None     Emotionally abused: None     Physically abused: None     Forced sexual activity: None   Other Topics Concern    None   Social History Narrative    None        SURGICAL HISTORY  Past Surgical History:   Procedure Laterality Date    ABDOMINAL EXPLORATION SURGERY      \"Intestines Were Tangled, Had Salmonella Poisoning\"    APPENDECTOMY      COLONOSCOPY  's    DENTAL SURGERY      Teeth Extracted In Past    EYE SURGERY Right 's    Cataract With Lens Implant    EYE SURGERY Left 2016    Cataract With Lens Implant    INGUINAL HERNIA REPAIR Right     With Mesh    INGUINAL HERNIA REPAIR Right 2018    With mesh and plug    VASECTOMY  1984       CURRENT MEDICATIONS  Current Outpatient Medications   Medication Sig Dispense Refill    lansoprazole (PREVACID) 30 MG delayed release capsule Take 30 mg by mouth daily      allopurinol (ZYLOPRIM) 100 MG tablet Take 100 mg by mouth daily      finasteride (PROSCAR) 5 MG tablet Take 5 mg by mouth daily      gabapentin (NEURONTIN) 100 MG capsule Take 100 mg by mouth 3 times daily.  TAKE 2 TABLETS TID      lisinopril (PRINIVIL;ZESTRIL) 2.5 MG tablet Take 2.5 mg by mouth daily      alfuzosin (UROXATRAL) 10 MG extended release tablet Take 10 mg by mouth daily      amLODIPine (NORVASC) 5 MG tablet Take 5 mg by mouth daily      ADVAIR DISKUS 250-50 MCG/DOSE AEPB Inhale 2 puffs into the lungs daily       celecoxib (CELEBREX) 400 MG capsule Take 400 mg by mouth nightly      predniSONE (DELTASONE) 5 MG tablet Take 5 mg by mouth daily        No current facility-administered medications for this visit. ALLERGIES  No Known Allergies    PHYSICAL EXAM    /80   Pulse 70   Ht 5' 11\" (1.803 m)   Wt 190 lb 6.4 oz (86.4 kg)   BMI 26.56 kg/m²     Constitutional:  Well developed, well nourished  HENT:  Normocephalic, atraumatic, bilateral external ears normal, oropharynx moist, nose normal, patient's right TM with cerumen impaction, left TM with minimal cerumen. Cerumen was removed easily from both ears with intact TMs postprocedure  Eyes:  PERRLA, EOMI, conjunctiva normal, no discharge, no scleral icterus    ASSESSMENT & PLAN    Jenny Bronson was seen today for cerumen impaction. Diagnoses and all orders for this visit:    Impacted cerumen of right ear         Medications Discontinued During This Encounter   Medication Reason    hydroxychloroquine (PLAQUENIL) 200 MG tablet LIST CLEANUP    lansoprazole (PREVACID) 15 MG delayed release capsule LIST CLEANUP    latanoprost (XALATAN) 0.005 % ophthalmic solution LIST CLEANUP    timolol (TIMOPTIC) 0.5 % ophthalmic solution LIST CLEANUP    warfarin (COUMADIN) 5 MG tablet LIST CLEANUP    tamsulosin (FLOMAX) 0.4 MG capsule LIST CLEANUP        Return for as scheduled. Plan of care reviewed with patient who verbalizes understanding and wishes to continue. Patient verbalizes understanding with the above plan and is in agreement. Patient will call with  worsening of symptoms, questions or concerns. Please note that this chart was generated using dragon dictation software.   Although every effort was made to ensure the accuracy of this automated transcription, some errors in transcription may have occurred.     Electronically signed by Suzanna Valenzuela PA-C on 6/18/2019

## 2019-06-27 ENCOUNTER — OFFICE VISIT (OUTPATIENT)
Dept: FAMILY MEDICINE CLINIC | Age: 79
End: 2019-06-27
Payer: MEDICARE

## 2019-06-27 VITALS
WEIGHT: 193.4 LBS | OXYGEN SATURATION: 97 % | DIASTOLIC BLOOD PRESSURE: 80 MMHG | SYSTOLIC BLOOD PRESSURE: 124 MMHG | BODY MASS INDEX: 27.07 KG/M2 | HEIGHT: 71 IN | HEART RATE: 80 BPM

## 2019-06-27 DIAGNOSIS — M19.90 OSTEOARTHRITIS, UNSPECIFIED OSTEOARTHRITIS TYPE, UNSPECIFIED SITE: ICD-10-CM

## 2019-06-27 DIAGNOSIS — I10 ESSENTIAL HYPERTENSION: Primary | ICD-10-CM

## 2019-06-27 DIAGNOSIS — N40.0 BENIGN PROSTATIC HYPERPLASIA WITHOUT LOWER URINARY TRACT SYMPTOMS: ICD-10-CM

## 2019-06-27 DIAGNOSIS — J44.9 CHRONIC OBSTRUCTIVE PULMONARY DISEASE, UNSPECIFIED COPD TYPE (HCC): ICD-10-CM

## 2019-06-27 PROCEDURE — 99214 OFFICE O/P EST MOD 30 MIN: CPT | Performed by: PHYSICIAN ASSISTANT

## 2019-06-27 RX ORDER — AMLODIPINE BESYLATE 5 MG/1
5 TABLET ORAL DAILY
Qty: 90 TABLET | Refills: 1 | Status: SHIPPED | OUTPATIENT
Start: 2019-06-27 | End: 2020-01-29 | Stop reason: SDUPTHER

## 2019-06-27 RX ORDER — CELECOXIB 400 MG/1
400 CAPSULE ORAL NIGHTLY
Qty: 90 CAPSULE | Refills: 1 | Status: SHIPPED | OUTPATIENT
Start: 2019-06-27 | End: 2019-07-23 | Stop reason: SDUPTHER

## 2019-06-27 RX ORDER — ALLOPURINOL 100 MG/1
100 TABLET ORAL DAILY
Qty: 90 TABLET | Refills: 1 | Status: SHIPPED | OUTPATIENT
Start: 2019-06-27 | End: 2020-01-29 | Stop reason: SDUPTHER

## 2019-06-27 RX ORDER — LISINOPRIL 2.5 MG/1
2.5 TABLET ORAL DAILY
Qty: 90 TABLET | Refills: 1 | Status: SHIPPED | OUTPATIENT
Start: 2019-06-27 | End: 2020-01-07

## 2019-06-27 RX ORDER — FINASTERIDE 5 MG/1
5 TABLET, FILM COATED ORAL DAILY
Qty: 90 TABLET | Refills: 1 | Status: SHIPPED | OUTPATIENT
Start: 2019-06-27 | End: 2020-01-10 | Stop reason: SDUPTHER

## 2019-06-27 NOTE — PROGRESS NOTES
6/27/2019    Patti Hui    Chief Complaint   Patient presents with    6 Month Follow-Up       HPI  History was obtained from patient. Juvencio Mayorga is a [de-identified] y.o. male who presents today for routine follow-up. Patient states he never did have any of the testing that was ordered by cardiology. Patient states he feels great and is not interested in doing any further testing at this time. Patient states he is having no shortness of breath no chest pain and he can function without problems. His biggest complaint is that of some low back pain and he is following with physician down in [place] who has been doing spinal injections. Patient states these only helped slightly. Patient has been urinating without difficulty. He takes his inhaler for his breathing without problems and this has been stable. He does not feel as  his pressure has been too high or too low. REVIEW OF SYMPTOMS    Constitutional:  Denies fever, chills, weight loss or weakness  Eyes:  Denies photophobia or discharge  ENT:  Denies sore throat or ear pain  Cardiovascular:  Denies chest pain, palpitations or swelling  Respiratory:  Denies cough or shortness of breath  GI:  Denies abdominal pain, nausea, vomiting, or diarrhea  Musculoskeletal: Chronic low back pain  Skin:  No rashes  Neurologic:  Denies headache  Endocrine:  Denies polyuria or polydipsia  Psychiatric:  Denies depression, suicidal ideation or homicidal ideation  All symptoms negative except as marked.      PAST MEDICAL HISTORY  Past Medical History:   Diagnosis Date    Acid reflux     Arthritis     \"Wrists\"    Benign prostatic hyperplasia     without outflow obstruction    BPH (benign prostatic hyperplasia)     [facility]     Dizziness     DVT (deep venous thrombosis) ([facility])     Right Leg In Late 1980's, Right Leg In 2002    Enlarged prostate     Essential hypertension     Glaucoma     Bilateral Eyes    Gout     [place] (hard of hearing) Bilateral Hearing Aids    Hx of blood clots Dx Late  And     \"DVT Right Leg\"    Hypertension     Impaired glucose tolerance     Slow urinary stream     Syncope     with loss of consciousness    Teeth missing     Upper And Lower    Wears glasses        FAMILY HISTORY  Family History   Problem Relation Age of Onset    Dementia Mother     Diabetes type 2  Father        SOCIAL HISTORY  Social History     Socioeconomic History    Marital status:      Spouse name: None    Number of children: None    Years of education: None    Highest education level: None   Occupational History    None   Social Needs    Financial resource strain: None    Food insecurity:     Worry: None     Inability: None    Transportation needs:     Medical: None     Non-medical: None   Tobacco Use    Smoking status: Former Smoker     Packs/day: 1.00     Years: 29.00     Pack years: 29.00     Types: Cigarettes, Pipe     Start date:      Last attempt to quit:      Years since quittin.5    Smokeless tobacco: Never Used   Substance and Sexual Activity    Alcohol use:  Yes     Alcohol/week: 1.2 - 1.8 oz     Types: 2 - 3 Glasses of wine per week     Comment: \"2 Glasses Of Wine Daily\"    Drug use: No    Sexual activity: Not Currently   Lifestyle    Physical activity:     Days per week: None     Minutes per session: None    Stress: None   Relationships    Social connections:     Talks on phone: None     Gets together: None     Attends Spiritism service: None     Active member of club or organization: None     Attends meetings of clubs or organizations: None     Relationship status: None    Intimate partner violence:     Fear of current or ex partner: None     Emotionally abused: None     Physically abused: None     Forced sexual activity: None   Other Topics Concern    None   Social History Narrative    None        SURGICAL HISTORY  Past Surgical History:   Procedure Laterality Date    ABDOMINAL EXPLORATION SURGERY  1984    \"Intestines Were Tangled, Had Salmonella Poisoning\"    APPENDECTOMY      COLONOSCOPY  2000's    DENTAL SURGERY      Teeth Extracted In Past    EYE SURGERY Right 1990's    Cataract With Lens Implant    EYE SURGERY Left 2016    Cataract With Lens Implant    INGUINAL HERNIA REPAIR Right 2000    With Mesh    INGUINAL HERNIA REPAIR Right 02/28/2018    With mesh and plug    VASECTOMY  1984       CURRENT MEDICATIONS  Current Outpatient Medications   Medication Sig Dispense Refill    celecoxib (CELEBREX) 400 MG capsule Take 1 capsule by mouth nightly 90 capsule 1    allopurinol (ZYLOPRIM) 100 MG tablet Take 1 tablet by mouth daily 90 tablet 1    amLODIPine (NORVASC) 5 MG tablet Take 1 tablet by mouth daily 90 tablet 1    lisinopril (PRINIVIL;ZESTRIL) 2.5 MG tablet Take 1 tablet by mouth daily 90 tablet 1    ADVAIR DISKUS 250-50 MCG/DOSE AEPB Inhale 1 puff into the lungs daily 1 Inhaler 5    finasteride (PROSCAR) 5 MG tablet Take 1 tablet by mouth daily 90 tablet 1    lansoprazole (PREVACID) 30 MG delayed release capsule Take 30 mg by mouth daily      gabapentin (NEURONTIN) 100 MG capsule Take 100 mg by mouth 3 times daily. TAKE 2 TABLETS TID      alfuzosin (UROXATRAL) 10 MG extended release tablet Take 10 mg by mouth daily      predniSONE (DELTASONE) 5 MG tablet Take 5 mg by mouth daily        No current facility-administered medications for this visit.         ALLERGIES  No Known Allergies    PHYSICAL EXAM    /80   Pulse 80   Ht 5' 11\" (1.803 m)   Wt 193 lb 6.4 oz (87.7 kg)   SpO2 97%   BMI 26.97 kg/m²     Constitutional:  Well developed, well nourished  HENT:  Normocephalic, atraumatic, bilateral external ears normal, oropharynx moist, nose normal  Eyes:  PERRLA, EOMI, conjunctiva normal, no discharge, no scleral icterus  Neck:  Normal range of motion, no tenderness, supple, no thyromegaly no carotid bruits noted  Lymphatic:  No lymphadenopathy continue on current medications. He will let us know or cardiology know if he changes his mind about any of the cardiology testing. He will continue to follow-up with pain doctor down in [place] about his lumbar spine pain. He will also continue to follow-up with Ashlee Monk and he actually has an appointment with him today. He does not want to decrease his Celebrex today as this is the only thing that helps. He is aware of possible side effects of GI bleed . Patient will call with worsening symptoms questions or concerns. He will have the above-mentioned lab work prior to his next appointment. Plan of care reviewed with patient who verbalizes understanding and wishes to continue. Patient verbalizes understanding with the above plan and is in agreement. Patient will call with  worsening of symptoms, questions or concerns. Please note that this chart was generated using dragon dictation software. Although every effort was made to ensure the accuracy of this automated transcription, some errors in transcription may have occurred.     Electronically signed by Suma Gillette PA-C on 6/27/2019

## 2019-07-23 ENCOUNTER — TELEPHONE (OUTPATIENT)
Dept: FAMILY MEDICINE CLINIC | Age: 79
End: 2019-07-23

## 2019-07-23 DIAGNOSIS — M19.90 OSTEOARTHRITIS, UNSPECIFIED OSTEOARTHRITIS TYPE, UNSPECIFIED SITE: ICD-10-CM

## 2019-07-23 RX ORDER — CELECOXIB 400 MG/1
400 CAPSULE ORAL NIGHTLY
Qty: 90 CAPSULE | Refills: 1 | Status: SHIPPED | OUTPATIENT
Start: 2019-07-23 | End: 2020-01-29 | Stop reason: SDUPTHER

## 2019-08-22 ENCOUNTER — OFFICE VISIT (OUTPATIENT)
Dept: FAMILY MEDICINE CLINIC | Age: 79
End: 2019-08-22
Payer: MEDICARE

## 2019-08-22 VITALS
SYSTOLIC BLOOD PRESSURE: 150 MMHG | HEIGHT: 71 IN | BODY MASS INDEX: 27.13 KG/M2 | HEART RATE: 64 BPM | DIASTOLIC BLOOD PRESSURE: 74 MMHG | WEIGHT: 193.8 LBS

## 2019-08-22 DIAGNOSIS — I10 ESSENTIAL HYPERTENSION: ICD-10-CM

## 2019-08-22 DIAGNOSIS — R55 SYNCOPE, UNSPECIFIED SYNCOPE TYPE: ICD-10-CM

## 2019-08-22 DIAGNOSIS — J44.9 CHRONIC OBSTRUCTIVE PULMONARY DISEASE, UNSPECIFIED COPD TYPE (HCC): Primary | ICD-10-CM

## 2019-08-22 DIAGNOSIS — R13.12 OROPHARYNGEAL DYSPHAGIA: ICD-10-CM

## 2019-08-22 PROCEDURE — G8926 SPIRO NO PERF OR DOC: HCPCS | Performed by: FAMILY MEDICINE

## 2019-08-22 PROCEDURE — G8419 CALC BMI OUT NRM PARAM NOF/U: HCPCS | Performed by: FAMILY MEDICINE

## 2019-08-22 PROCEDURE — 1123F ACP DISCUSS/DSCN MKR DOCD: CPT | Performed by: FAMILY MEDICINE

## 2019-08-22 PROCEDURE — 99214 OFFICE O/P EST MOD 30 MIN: CPT | Performed by: FAMILY MEDICINE

## 2019-08-22 PROCEDURE — 90732 PPSV23 VACC 2 YRS+ SUBQ/IM: CPT | Performed by: FAMILY MEDICINE

## 2019-08-22 PROCEDURE — 4040F PNEUMOC VAC/ADMIN/RCVD: CPT | Performed by: FAMILY MEDICINE

## 2019-08-22 PROCEDURE — 3023F SPIROM DOC REV: CPT | Performed by: FAMILY MEDICINE

## 2019-08-22 PROCEDURE — G0009 ADMIN PNEUMOCOCCAL VACCINE: HCPCS | Performed by: FAMILY MEDICINE

## 2019-08-22 PROCEDURE — G8427 DOCREV CUR MEDS BY ELIG CLIN: HCPCS | Performed by: FAMILY MEDICINE

## 2019-08-22 PROCEDURE — 1036F TOBACCO NON-USER: CPT | Performed by: FAMILY MEDICINE

## 2019-08-22 NOTE — PROGRESS NOTES
None   Social Needs    Financial resource strain: None    Food insecurity:     Worry: None     Inability: None    Transportation needs:     Medical: None     Non-medical: None   Tobacco Use    Smoking status: Former Smoker     Packs/day: 1.00     Years: 29.00     Pack years: 29.00     Types: Cigarettes, Pipe     Start date:      Last attempt to quit:      Years since quittin.6    Smokeless tobacco: Never Used   Substance and Sexual Activity    Alcohol use:  Yes     Alcohol/week: 2.0 - 3.0 standard drinks     Types: 2 - 3 Glasses of wine per week     Comment: \"2 Glasses Of Wine Daily\"    Drug use: No    Sexual activity: Not Currently   Lifestyle    Physical activity:     Days per week: None     Minutes per session: None    Stress: None   Relationships    Social connections:     Talks on phone: None     Gets together: None     Attends Roman Catholic service: None     Active member of club or organization: None     Attends meetings of clubs or organizations: None     Relationship status: None    Intimate partner violence:     Fear of current or ex partner: None     Emotionally abused: None     Physically abused: None     Forced sexual activity: None   Other Topics Concern    None   Social History Narrative    None        SURGICAL HISTORY  Past Surgical History:   Procedure Laterality Date    ABDOMINAL EXPLORATION SURGERY      \"Intestines Were Tangled, Had Salmonella Poisoning\"    APPENDECTOMY      COLONOSCOPY  's    DENTAL SURGERY      Teeth Extracted In Past    EYE SURGERY Right     Cataract With Lens Implant    EYE SURGERY Left 2016    Cataract With Lens Implant    INGUINAL HERNIA REPAIR Right     With Mesh    INGUINAL HERNIA REPAIR Right 2018    With mesh and plug    VASECTOMY  1984       CURRENT MEDICATIONS  Current Outpatient Medications   Medication Sig Dispense Refill    celecoxib (CELEBREX) 400 MG capsule Take 1 capsule by mouth nightly 90 capsule 1    allopurinol (ZYLOPRIM) 100 MG tablet Take 1 tablet by mouth daily 90 tablet 1    amLODIPine (NORVASC) 5 MG tablet Take 1 tablet by mouth daily 90 tablet 1    lisinopril (PRINIVIL;ZESTRIL) 2.5 MG tablet Take 1 tablet by mouth daily 90 tablet 1    ADVAIR DISKUS 250-50 MCG/DOSE AEPB Inhale 1 puff into the lungs daily 1 Inhaler 5    finasteride (PROSCAR) 5 MG tablet Take 1 tablet by mouth daily 90 tablet 1    lansoprazole (PREVACID) 30 MG delayed release capsule Take 30 mg by mouth daily      gabapentin (NEURONTIN) 100 MG capsule Take 100 mg by mouth 3 times daily. TAKE 2 TABLETS TID      alfuzosin (UROXATRAL) 10 MG extended release tablet Take 10 mg by mouth daily      predniSONE (DELTASONE) 5 MG tablet Take 5 mg by mouth daily        No current facility-administered medications for this visit. ALLERGIES  No Known Allergies    PHYSICAL EXAM    BP (!) 150/74 (Site: Left Upper Arm, Position: Sitting, Cuff Size: Medium Adult)   Pulse 64   Ht 5' 11\" (1.803 m)   Wt 193 lb 12.8 oz (87.9 kg)   BMI 27.03 kg/m²     Physical Exam   Constitutional: He is oriented to person, place, and time. He appears well-developed and well-nourished. HENT:   Head: Normocephalic. Right Ear: External ear normal.   Left Ear: External ear normal.   Nose: Nose normal.   Mouth/Throat: Oropharynx is clear and moist.   Eyes: Pupils are equal, round, and reactive to light. Conjunctivae and EOM are normal.   Neck: Neck supple. Cardiovascular: Normal rate, regular rhythm and normal heart sounds. Pulmonary/Chest: Effort normal and breath sounds normal.   Abdominal: Soft. Bowel sounds are normal.   Musculoskeletal: Normal range of motion. Neurological: He is alert and oriented to person, place, and time. Skin: Skin is warm and dry. Psychiatric: He has a normal mood and affect. His behavior is normal. Judgment and thought content normal.   Vitals reviewed. ASSESSMENT:    Ani Hill was seen today for cough.     Diagnoses and

## 2020-01-07 RX ORDER — FINASTERIDE 5 MG/1
5 TABLET, FILM COATED ORAL DAILY
Qty: 90 TABLET | Refills: 1 | Status: CANCELLED | OUTPATIENT
Start: 2020-01-07

## 2020-01-07 RX ORDER — LISINOPRIL 2.5 MG/1
TABLET ORAL
Qty: 30 TABLET | Refills: 0 | Status: SHIPPED | OUTPATIENT
Start: 2020-01-07 | End: 2020-01-07 | Stop reason: SDUPTHER

## 2020-01-10 RX ORDER — FINASTERIDE 5 MG/1
5 TABLET, FILM COATED ORAL DAILY
Qty: 90 TABLET | Refills: 1 | Status: SHIPPED | OUTPATIENT
Start: 2020-01-10 | End: 2020-01-10 | Stop reason: SDUPTHER

## 2020-01-10 RX ORDER — LISINOPRIL 2.5 MG/1
TABLET ORAL
Qty: 30 TABLET | Refills: 2 | Status: SHIPPED | OUTPATIENT
Start: 2020-01-10 | End: 2020-01-29 | Stop reason: SDUPTHER

## 2020-01-29 RX ORDER — LISINOPRIL 2.5 MG/1
TABLET ORAL
Qty: 30 TABLET | Refills: 2 | Status: SHIPPED | OUTPATIENT
Start: 2020-01-29 | End: 2020-02-27 | Stop reason: SDUPTHER

## 2020-01-29 RX ORDER — FINASTERIDE 5 MG/1
5 TABLET, FILM COATED ORAL DAILY
Qty: 90 TABLET | Refills: 1 | Status: SHIPPED | OUTPATIENT
Start: 2020-01-29 | End: 2020-07-20 | Stop reason: SDUPTHER

## 2020-01-29 RX ORDER — CELECOXIB 400 MG/1
400 CAPSULE ORAL NIGHTLY
Qty: 90 CAPSULE | Refills: 1 | Status: SHIPPED
Start: 2020-01-29 | End: 2020-02-27 | Stop reason: SINTOL

## 2020-01-29 RX ORDER — ALLOPURINOL 100 MG/1
100 TABLET ORAL DAILY
Qty: 90 TABLET | Refills: 1 | Status: SHIPPED | OUTPATIENT
Start: 2020-01-29 | End: 2020-08-10 | Stop reason: SDUPTHER

## 2020-01-29 RX ORDER — AMLODIPINE BESYLATE 5 MG/1
5 TABLET ORAL DAILY
Qty: 90 TABLET | Refills: 1 | Status: SHIPPED | OUTPATIENT
Start: 2020-01-29 | End: 2020-08-10 | Stop reason: SDUPTHER

## 2020-02-27 ENCOUNTER — OFFICE VISIT (OUTPATIENT)
Dept: FAMILY MEDICINE CLINIC | Age: 80
End: 2020-02-27
Payer: MEDICARE

## 2020-02-27 VITALS
SYSTOLIC BLOOD PRESSURE: 120 MMHG | BODY MASS INDEX: 26.79 KG/M2 | DIASTOLIC BLOOD PRESSURE: 64 MMHG | HEART RATE: 70 BPM | HEIGHT: 72 IN | OXYGEN SATURATION: 96 % | WEIGHT: 197.8 LBS

## 2020-02-27 PROBLEM — B02.29 POST HERPETIC NEURALGIA: Chronic | Status: ACTIVE | Noted: 2020-02-27

## 2020-02-27 PROBLEM — Z79.01 ANTICOAGULATED: Status: ACTIVE | Noted: 2020-02-27

## 2020-02-27 PROCEDURE — G8427 DOCREV CUR MEDS BY ELIG CLIN: HCPCS | Performed by: FAMILY MEDICINE

## 2020-02-27 PROCEDURE — 1123F ACP DISCUSS/DSCN MKR DOCD: CPT | Performed by: FAMILY MEDICINE

## 2020-02-27 PROCEDURE — 1036F TOBACCO NON-USER: CPT | Performed by: FAMILY MEDICINE

## 2020-02-27 PROCEDURE — G8417 CALC BMI ABV UP PARAM F/U: HCPCS | Performed by: FAMILY MEDICINE

## 2020-02-27 PROCEDURE — 99214 OFFICE O/P EST MOD 30 MIN: CPT | Performed by: FAMILY MEDICINE

## 2020-02-27 PROCEDURE — 3023F SPIROM DOC REV: CPT | Performed by: FAMILY MEDICINE

## 2020-02-27 PROCEDURE — G8926 SPIRO NO PERF OR DOC: HCPCS | Performed by: FAMILY MEDICINE

## 2020-02-27 PROCEDURE — G8482 FLU IMMUNIZE ORDER/ADMIN: HCPCS | Performed by: FAMILY MEDICINE

## 2020-02-27 PROCEDURE — 4040F PNEUMOC VAC/ADMIN/RCVD: CPT | Performed by: FAMILY MEDICINE

## 2020-02-27 RX ORDER — LISINOPRIL 2.5 MG/1
TABLET ORAL
Qty: 30 TABLET | Refills: 2 | Status: SHIPPED | OUTPATIENT
Start: 2020-02-27 | End: 2020-07-08

## 2020-02-27 ASSESSMENT — ENCOUNTER SYMPTOMS
WHEEZING: 0
RESPIRATORY NEGATIVE: 1
SHORTNESS OF BREATH: 0
ABDOMINAL PAIN: 0
CHEST TIGHTNESS: 0
COUGH: 0

## 2020-02-27 NOTE — PROGRESS NOTES
2020     Mya Fuentes      Chief Complaint   Patient presents with    6 Month Follow-Up    COPD       HPI      Mary Ellen Patel is a [de-identified] y.o. male who presents today with the followin. Chronic obstructive pulmonary disease, unspecified COPD type ([address]    2. Essential hypertension    3. Post herpetic neuralgia    4. Primary osteoarthritis of right wrist    Patient is here for follow-up  He had questions about his postherpetic neuralgia has had this for 17 months  He is been seeing pain management had multiple injections he had some type of infusion therapy he did not tolerate gabapentin he said the pain is lower level 3 but is tolerable  There is no other plans for different treatment at this time  He no longer anticoagulated with warfarin  He is on medication for hypertension and COPD  He does not smoke his breathing capacity is good  He sees a rheumatologist he has BPH and is on appropriate treatment for that  Was taken off of Celebrex by rheumatologist he was told he had an abnormal kidney function  This is never been demonstrated in our office    REVIEW OF SYMPTOMS    Review of Systems   Constitutional: Negative. Negative for activity change, appetite change and unexpected weight change. HENT: Negative. Respiratory: Negative. Negative for cough, chest tightness, shortness of breath and wheezing. Cardiovascular: Negative. Negative for chest pain and leg swelling. Gastrointestinal: Negative for abdominal pain. Genitourinary: Positive for difficulty urinating and urgency. Musculoskeletal: Positive for arthralgias. Neurological:        Postherpetic neuralgia in an upper thoracic dermatome left side   Psychiatric/Behavioral: Negative.         PAST MEDICAL HISTORY  Past Medical History:   Diagnosis Date    Acid reflux     Arthritis     \"Wrists\"    Benign prostatic hyperplasia     without outflow obstruction    BPH (benign prostatic hyperplasia)     [facility] [address]     Dizziness     DVT (deep venous thrombosis) ([facility])     Right Leg In Late , Right Leg In     Enlarged prostate     Essential hypertension     Glaucoma     Bilateral Eyes    Gout     [place] (hard of hearing)     Bilateral Hearing Aids    Hx of blood clots Dx Late  And     \"DVT Right Leg\"    Hypertension     Impaired glucose tolerance     Slow urinary stream     Syncope     with loss of consciousness    Teeth missing     Upper And Lower    Wears glasses        FAMILY HISTORY  Family History   Problem Relation Age of Onset    Dementia Mother     Diabetes type 2  Father        SOCIAL HISTORY  Social History     Socioeconomic History    Marital status:      Spouse name: Not on file    Number of children: Not on file    Years of education: Not on file    Highest education level: Not on file   Occupational History    Not on file   Social Needs    Financial resource strain: Not on file    Food insecurity:     Worry: Not on file     Inability: Not on file    Transportation needs:     Medical: Not on file     Non-medical: Not on file   Tobacco Use    Smoking status: Former Smoker     Packs/day: 1.00     Years: 29.00     Pack years: 29.00     Types: Cigarettes, Pipe     Start date:      Last attempt to quit:      Years since quittin.1    Smokeless tobacco: Never Used   Substance and Sexual Activity    Alcohol use:  Yes     Alcohol/week: 2.0 - 3.0 standard drinks     Types: 2 - 3 Glasses of wine per week     Comment: \"2 Glasses Of Wine Daily\"    Drug use: No    Sexual activity: Not Currently   Lifestyle    Physical activity:     Days per week: Not on file     Minutes per session: Not on file    Stress: Not on file   Relationships    Social connections:     Talks on phone: Not on file     Gets together: Not on file     Attends Caodaism service: Not on file     Active member of club or organization: Not on file     Attends meetings of clubs or well-developed. HENT:      Right Ear: External ear normal.      Left Ear: External ear normal.      Nose: Nose normal.      Mouth/Throat:      Mouth: Mucous membranes are moist.      Pharynx: Oropharynx is clear. Eyes:      Conjunctiva/sclera: Conjunctivae normal.   Cardiovascular:      Rate and Rhythm: Normal rate and regular rhythm. Heart sounds: Normal heart sounds. Pulmonary:      Effort: Pulmonary effort is normal. No respiratory distress. Breath sounds: Normal breath sounds. No wheezing or rales. Musculoskeletal:      Right lower leg: No edema. Left lower leg: No edema. Skin:     Findings: No rash. Neurological:      Mental Status: He is alert and oriented to person, place, and time. Psychiatric:         Mood and Affect: Mood normal.         Behavior: Behavior normal.         ASSESSMENT and Monroe Early was seen today for 6 month follow-up and copd. Diagnoses and all orders for this visit:    Chronic obstructive pulmonary disease, unspecified COPD type [facility] [facility]    Essential hypertension  -     lisinopril (PRINIVIL;ZESTRIL) 2.5 MG tablet; TAKE ONE TABLET BY MOUTH DAILY    Post herpetic neuralgia    Primary osteoarthritis of right wrist      Advise shingles vaccine  Continue same medications  Follow-up here in 6 months  Get copies of most recent labs from her rheumatologist regarding his renal function  Return in about 6 months (around 8/27/2020). Electronically signed by Dale Leyden, MD on 2/27/2020    Please note that this chart was generated using dragon dictation software. Although every effort was made to ensure the accuracy of this automated transcription, some errors in transcription may have occurred.

## 2020-07-08 RX ORDER — LISINOPRIL 2.5 MG/1
TABLET ORAL
Qty: 30 TABLET | Refills: 1 | Status: SHIPPED
Start: 2020-07-08 | End: 2020-08-10 | Stop reason: DRUGHIGH

## 2020-07-13 NOTE — TELEPHONE ENCOUNTER
DR RICO HAD CHANGED  FROM 1 PUFF TO 2 PUFFS AT PT'S LAST VISIT.     LV-2/27  NA-6/27    [facility] Kathleen CHERRIE

## 2020-07-16 ENCOUNTER — TELEPHONE (OUTPATIENT)
Dept: FAMILY MEDICINE CLINIC | Age: 80
End: 2020-07-16

## 2020-07-16 NOTE — TELEPHONE ENCOUNTER
Requested Prescriptions     Signed Prescriptions Disp Refills    ADVAIR DISKUS 250-50 MCG/DOSE AEPB 1 Inhaler 1     Sig: Inhale 1 puff into the lungs daily     Authorizing Provider: Jorge Luis Cortés User: Cata Landeros

## 2020-07-16 NOTE — TELEPHONE ENCOUNTER
Sheryle Dus at [facility] want to ask if Lisinopril can be filled for 90 days per insurance requirements.

## 2020-07-20 RX ORDER — FINASTERIDE 5 MG/1
5 TABLET, FILM COATED ORAL DAILY
Qty: 90 TABLET | Refills: 1 | Status: SHIPPED | OUTPATIENT
Start: 2020-07-20 | End: 2020-08-10 | Stop reason: SDUPTHER

## 2020-07-29 ENCOUNTER — TELEPHONE (OUTPATIENT)
Dept: FAMILY MEDICINE CLINIC | Age: 80
End: 2020-07-29

## 2020-07-31 ENCOUNTER — TELEPHONE (OUTPATIENT)
Dept: FAMILY MEDICINE CLINIC | Age: 80
End: 2020-07-31

## 2020-07-31 RX ORDER — LISINOPRIL 5 MG/1
5 TABLET ORAL DAILY
Qty: 90 TABLET | Refills: 1 | Status: SHIPPED | OUTPATIENT
Start: 2020-07-31 | End: 2020-08-10 | Stop reason: SDUPTHER

## 2020-07-31 NOTE — TELEPHONE ENCOUNTER
Wed after- 140/77    Thurs Morn 152/72  After- 193/90    Friday Morn 173/80      Pt called Wednesday and dentist wouldn't see him due to blood pressure-- Barb Velásquez had pt take an extra lisinopril on Wednesday only-- this were the reading from that

## 2020-07-31 NOTE — TELEPHONE ENCOUNTER
Pt was informed of all medical advisement. Pt expressed understanding and a new rx for lisinopril 5 mg was sent to [facility].

## 2020-07-31 NOTE — TELEPHONE ENCOUNTER
Stay on the double dose and go ahead and reschedule the dental procedure  He probably should stay on that dose long-term  I believe it will now be 5 mg as the pills were 2.5  If he needs a prescription of the fives we can go ahead and get that set up as well

## 2020-07-31 NOTE — TELEPHONE ENCOUNTER
Wed after- 140/77     Thurs Morn 152/72  After- 193/90     Friday Morn 173/80        Pt called Wednesday and dentist wouldn't see him due to blood pressure-- Harris Castro had pt take an extra lisinopril on Wednesday only-- this were the reading from that.  Please advise

## 2020-08-10 ENCOUNTER — OFFICE VISIT (OUTPATIENT)
Dept: FAMILY MEDICINE CLINIC | Age: 80
End: 2020-08-10
Payer: MEDICARE

## 2020-08-10 VITALS
HEIGHT: 72 IN | HEART RATE: 69 BPM | TEMPERATURE: 97.9 F | DIASTOLIC BLOOD PRESSURE: 80 MMHG | BODY MASS INDEX: 25.63 KG/M2 | OXYGEN SATURATION: 96 % | SYSTOLIC BLOOD PRESSURE: 128 MMHG | WEIGHT: 189.2 LBS

## 2020-08-10 DIAGNOSIS — R73.02 IMPAIRED GLUCOSE TOLERANCE: ICD-10-CM

## 2020-08-10 DIAGNOSIS — I10 ESSENTIAL HYPERTENSION: ICD-10-CM

## 2020-08-10 PROBLEM — I82.409 DVT (DEEP VENOUS THROMBOSIS) (HCC): Status: RESOLVED | Noted: 2019-06-14 | Resolved: 2020-08-10

## 2020-08-10 PROBLEM — K40.91 RECURRENT INGUINAL HERNIA OF RIGHT SIDE WITHOUT OBSTRUCTION OR GANGRENE: Status: RESOLVED | Noted: 2018-02-28 | Resolved: 2020-08-10

## 2020-08-10 PROBLEM — Z79.01 ANTICOAGULATED: Status: RESOLVED | Noted: 2020-02-27 | Resolved: 2020-08-10

## 2020-08-10 PROBLEM — J18.9 PNEUMONIA: Status: RESOLVED | Noted: 2018-05-15 | Resolved: 2020-08-10

## 2020-08-10 LAB
A/G RATIO: 1.1 (ref 1.1–2.2)
ALBUMIN SERPL-MCNC: 4.1 G/DL (ref 3.4–5)
ALP BLD-CCNC: 48 U/L (ref 40–129)
ALT SERPL-CCNC: 15 U/L (ref 10–40)
ANION GAP SERPL CALCULATED.3IONS-SCNC: 13 MMOL/L (ref 3–16)
AST SERPL-CCNC: 19 U/L (ref 15–37)
BILIRUB SERPL-MCNC: 0.5 MG/DL (ref 0–1)
BUN BLDV-MCNC: 19 MG/DL (ref 7–20)
CALCIUM SERPL-MCNC: 9.7 MG/DL (ref 8.3–10.6)
CHLORIDE BLD-SCNC: 92 MMOL/L (ref 99–110)
CHOLESTEROL, TOTAL: 136 MG/DL (ref 0–199)
CO2: 23 MMOL/L (ref 21–32)
CREAT SERPL-MCNC: 1.2 MG/DL (ref 0.8–1.3)
GFR AFRICAN AMERICAN: >60
GFR NON-AFRICAN AMERICAN: 58
GLOBULIN: 3.9 G/DL
GLUCOSE BLD-MCNC: 99 MG/DL (ref 70–99)
HDLC SERPL-MCNC: 79 MG/DL (ref 40–60)
LDL CHOLESTEROL CALCULATED: 49 MG/DL
POTASSIUM SERPL-SCNC: 5.3 MMOL/L (ref 3.5–5.1)
SODIUM BLD-SCNC: 128 MMOL/L (ref 136–145)
TOTAL PROTEIN: 8 G/DL (ref 6.4–8.2)
TRIGL SERPL-MCNC: 38 MG/DL (ref 0–150)
URIC ACID, SERUM: 4.6 MG/DL (ref 3.5–7.2)
VLDLC SERPL CALC-MCNC: 8 MG/DL

## 2020-08-10 PROCEDURE — 4040F PNEUMOC VAC/ADMIN/RCVD: CPT | Performed by: FAMILY MEDICINE

## 2020-08-10 PROCEDURE — 1123F ACP DISCUSS/DSCN MKR DOCD: CPT | Performed by: FAMILY MEDICINE

## 2020-08-10 PROCEDURE — G8427 DOCREV CUR MEDS BY ELIG CLIN: HCPCS | Performed by: FAMILY MEDICINE

## 2020-08-10 PROCEDURE — G8510 SCR DEP NEG, NO PLAN REQD: HCPCS | Performed by: FAMILY MEDICINE

## 2020-08-10 PROCEDURE — 3288F FALL RISK ASSESSMENT DOCD: CPT | Performed by: FAMILY MEDICINE

## 2020-08-10 PROCEDURE — 99214 OFFICE O/P EST MOD 30 MIN: CPT | Performed by: FAMILY MEDICINE

## 2020-08-10 RX ORDER — LISINOPRIL 10 MG/1
10 TABLET ORAL DAILY
Qty: 90 TABLET | Refills: 1 | Status: ON HOLD | OUTPATIENT
Start: 2020-08-10 | End: 2020-09-12 | Stop reason: SDUPTHER

## 2020-08-10 RX ORDER — CELECOXIB 400 MG/1
400 CAPSULE ORAL 2 TIMES DAILY
COMMUNITY
End: 2020-08-17 | Stop reason: SDUPTHER

## 2020-08-10 RX ORDER — FINASTERIDE 5 MG/1
5 TABLET, FILM COATED ORAL DAILY
Qty: 90 TABLET | Refills: 1 | Status: SHIPPED | OUTPATIENT
Start: 2020-08-10 | End: 2021-02-19 | Stop reason: SDUPTHER

## 2020-08-10 RX ORDER — AMLODIPINE BESYLATE 5 MG/1
5 TABLET ORAL DAILY
Qty: 90 TABLET | Refills: 1 | Status: ON HOLD
Start: 2020-08-10 | End: 2020-09-14 | Stop reason: HOSPADM

## 2020-08-10 RX ORDER — ALLOPURINOL 100 MG/1
100 TABLET ORAL DAILY
Qty: 90 TABLET | Refills: 1 | Status: SHIPPED | OUTPATIENT
Start: 2020-08-10 | End: 2021-02-19 | Stop reason: SDUPTHER

## 2020-08-10 ASSESSMENT — PATIENT HEALTH QUESTIONNAIRE - PHQ9
SUM OF ALL RESPONSES TO PHQ9 QUESTIONS 1 & 2: 0
SUM OF ALL RESPONSES TO PHQ QUESTIONS 1-9: 0
2. FEELING DOWN, DEPRESSED OR HOPELESS: 0
SUM OF ALL RESPONSES TO PHQ QUESTIONS 1-9: 0
1. LITTLE INTEREST OR PLEASURE IN DOING THINGS: 0

## 2020-08-10 ASSESSMENT — ENCOUNTER SYMPTOMS
SORE THROAT: 0
DIARRHEA: 0
RHINORRHEA: 0

## 2020-08-10 NOTE — PROGRESS NOTES
8/10/2020     Mick Banks      Chief Complaint   Patient presents with    6 Month Follow-Up    Hypertension     Blood pressure has been high,        HPI      Kayy Hanna is a 79 y.o. male who presents today with the followin. Impaired glucose tolerance    2. Need for prophylactic vaccination against diphtheria-tetanus-pertussis (DTP)    3. Need for prophylactic vaccination and inoculation against varicella    4. Benign prostatic hyperplasia without lower urinary tract symptoms    5. Essential hypertension    6. Osteoarthritis, unspecified osteoarthritis type, unspecified site    7. Chronic obstructive pulmonary disease, unspecified COPD type ([address]    8. Chronic deep vein thrombosis (DVT) of proximal vein of lower extremity, unspecified laterality ([address]    9. Primary osteoarthritis of right wrist    10. Anticoagulated    Patient is here for follow-up  He brought in many blood pressures from home 90% of these are elevated some in the 160 range  He has been on amlodipine and low-dose lisinopril 5 mg daily  He is tolerating the medicine without any significant side effects      REVIEW OF SYMPTOMS    Review of Systems   Constitutional: Positive for activity change. Negative for fever and unexpected weight change. Less active due to Covid 19 restrictions  He lives with his wife  They have little contact with people outside their house  He has no symptoms or known exposure   HENT: Negative for congestion, rhinorrhea and sore throat. Respiratory:        COPD  He is on Advair  He was only taking once a day I told him to take it twice  He rarely uses albuterol rescue inhaler  He does not smoke   Cardiovascular: Negative for chest pain. History of hypertension not well controlled recently see HPI  No known history of heart disease   Gastrointestinal: Negative for diarrhea.    Endocrine:        History of impaired fasting glucose   Genitourinary:        BPH  He gets up about once per night to urinate  He  Transportation needs     Medical: None     Non-medical: None   Tobacco Use    Smoking status: Former Smoker     Packs/day: 1.00     Years: 29.00     Pack years: 29.00     Types: Cigarettes, Pipe     Start date:      Last attempt to quit:      Years since quittin.6    Smokeless tobacco: Never Used   Substance and Sexual Activity    Alcohol use:  Yes     Alcohol/week: 2.0 - 3.0 standard drinks     Types: 2 - 3 Glasses of wine per week     Comment: \"2 Glasses Of Wine Daily\"    Drug use: No    Sexual activity: Not Currently   Lifestyle    Physical activity     Days per week: None     Minutes per session: None    Stress: None   Relationships    Social connections     Talks on phone: None     Gets together: None     Attends Jehovah's witness service: None     Active member of club or organization: None     Attends meetings of clubs or organizations: None     Relationship status: None    Intimate partner violence     Fear of current or ex partner: None     Emotionally abused: None     Physically abused: None     Forced sexual activity: None   Other Topics Concern    None   Social History Narrative    None        SURGICAL HISTORY  Past Surgical History:   Procedure Laterality Date    ABDOMINAL EXPLORATION SURGERY      \"Intestines Were Tangled, Had Salmonella Poisoning\"    APPENDECTOMY      COLONOSCOPY  's    DENTAL SURGERY      Teeth Extracted In Past    EYE SURGERY Right     Cataract With Lens Implant    EYE SURGERY Left 2016    Cataract With Lens Implant    INGUINAL HERNIA REPAIR Right     With Mesh    INGUINAL HERNIA REPAIR Right 2018    With mesh and plug    VASECTOMY  1984       CURRENT MEDICATIONS  Current Outpatient Medications   Medication Sig Dispense Refill    lisinopril (PRINIVIL;ZESTRIL) 5 MG tablet Take 1 tablet by mouth daily 90 tablet 1    finasteride (PROSCAR) 5 MG tablet Take 1 tablet by mouth daily 90 tablet 1    ADVAIR DISKUS 250-50 MCG/DOSE AEPB Inhale 1 puff into the lungs daily 1 Inhaler 1    amLODIPine (NORVASC) 5 MG tablet Take 1 tablet by mouth daily 90 tablet 1    allopurinol (ZYLOPRIM) 100 MG tablet Take 1 tablet by mouth daily 90 tablet 1    lansoprazole (PREVACID) 30 MG delayed release capsule Take 30 mg by mouth daily      alfuzosin (UROXATRAL) 10 MG extended release tablet Take 10 mg by mouth daily      predniSONE (DELTASONE) 5 MG tablet Take 5 mg by mouth daily        No current facility-administered medications for this visit. ALLERGIES  No Known Allergies    PHYSICAL EXAM    /80 (Site: Left Upper Arm, Position: Sitting, Cuff Size: Medium Adult)   Pulse 69   Temp 97.9 °F (36.6 °C) (Temporal)   Ht 6' (1.829 m)   Wt 189 lb 3.2 oz (85.8 kg)   SpO2 96%   BMI 25.66 kg/m²     Physical Exam  Constitutional:       General: He is not in acute distress. Appearance: Normal appearance. HENT:      Head: Normocephalic. Right Ear: External ear normal.      Left Ear: External ear normal.      Mouth/Throat:      Pharynx: Oropharynx is clear. Eyes:      Conjunctiva/sclera: Conjunctivae normal.   Cardiovascular:      Rate and Rhythm: Normal rate. Pulmonary:      Effort: Pulmonary effort is normal. No respiratory distress. Neurological:      General: No focal deficit present. Mental Status: He is alert. Mental status is at baseline. Psychiatric:         Mood and Affect: Mood normal.         Thought Content: Thought content normal.     Reviewed his immunizations  He is due for Tdap and Shingrix  I reviewed his labs from a year ago and he is due to have these repeated    ASSESSMENT and Oraliajaime  was seen today for 6 month follow-up and hypertension.     Diagnoses and all orders for this visit:    Impaired glucose tolerance    Need for prophylactic vaccination against diphtheria-tetanus-pertussis (DTP)    Need for prophylactic vaccination and inoculation against varicella    Benign prostatic hyperplasia without lower urinary tract symptoms    Essential hypertension    Osteoarthritis, unspecified osteoarthritis type, unspecified site    Chronic obstructive pulmonary disease, unspecified COPD type (HCC)    Chronic deep vein thrombosis (DVT) of proximal vein of lower extremity, unspecified laterality (HCC)    Primary osteoarthritis of right wrist    Anticoagulated      Blood pressures not controlled  Will increase his lisinopril 10 mg daily  He is to get labs done today  He is to get updated immunizations  We will see him back in 3 months  He is to call in 4 to 6 weeks if his blood pressure does not come down  No follow-ups on file. Electronically signed by Mayra Willis MD on 8/10/2020    Please note that this chart was generated using dragon dictation software. Although every effort was made to ensure the accuracy of this automated transcription, some errors in transcription may have occurred.

## 2020-08-11 LAB
ESTIMATED AVERAGE GLUCOSE: 119.8 MG/DL
HBA1C MFR BLD: 5.8 %

## 2020-08-21 RX ORDER — CELECOXIB 400 MG/1
400 CAPSULE ORAL 2 TIMES DAILY
Qty: 60 CAPSULE | Refills: 1 | Status: ON HOLD
Start: 2020-08-21 | End: 2020-09-12 | Stop reason: HOSPADM

## 2020-09-08 ENCOUNTER — TELEPHONE (OUTPATIENT)
Dept: FAMILY MEDICINE CLINIC | Age: 80
End: 2020-09-08

## 2020-09-09 ENCOUNTER — OFFICE VISIT (OUTPATIENT)
Dept: FAMILY MEDICINE CLINIC | Age: 80
End: 2020-09-09
Payer: MEDICARE

## 2020-09-09 VITALS
DIASTOLIC BLOOD PRESSURE: 64 MMHG | TEMPERATURE: 97.3 F | WEIGHT: 189.4 LBS | SYSTOLIC BLOOD PRESSURE: 126 MMHG | BODY MASS INDEX: 25.65 KG/M2 | OXYGEN SATURATION: 95 % | HEIGHT: 72 IN | HEART RATE: 79 BPM

## 2020-09-09 DIAGNOSIS — R53.83 FATIGUE, UNSPECIFIED TYPE: ICD-10-CM

## 2020-09-09 DIAGNOSIS — R06.02 SOB (SHORTNESS OF BREATH): ICD-10-CM

## 2020-09-09 LAB
A/G RATIO: 1.1 (ref 1.1–2.2)
ALBUMIN SERPL-MCNC: 3.9 G/DL (ref 3.4–5)
ALP BLD-CCNC: 49 U/L (ref 40–129)
ALT SERPL-CCNC: 14 U/L (ref 10–40)
ANION GAP SERPL CALCULATED.3IONS-SCNC: 11 MMOL/L (ref 3–16)
AST SERPL-CCNC: 24 U/L (ref 15–37)
BASOPHILS ABSOLUTE: 0 K/UL (ref 0–0.2)
BASOPHILS RELATIVE PERCENT: 0.6 %
BILIRUB SERPL-MCNC: 0.7 MG/DL (ref 0–1)
BUN BLDV-MCNC: 13 MG/DL (ref 7–20)
CALCIUM SERPL-MCNC: 9.3 MG/DL (ref 8.3–10.6)
CHLORIDE BLD-SCNC: 86 MMOL/L (ref 99–110)
CO2: 23 MMOL/L (ref 21–32)
CREAT SERPL-MCNC: 0.8 MG/DL (ref 0.8–1.3)
EOSINOPHILS ABSOLUTE: 0.1 K/UL (ref 0–0.6)
EOSINOPHILS RELATIVE PERCENT: 1.8 %
GFR AFRICAN AMERICAN: >60
GFR NON-AFRICAN AMERICAN: >60
GLOBULIN: 3.4 G/DL
GLUCOSE BLD-MCNC: 139 MG/DL (ref 70–99)
HCT VFR BLD CALC: 37.9 % (ref 40.5–52.5)
HEMOGLOBIN: 13.1 G/DL (ref 13.5–17.5)
LYMPHOCYTES ABSOLUTE: 0.9 K/UL (ref 1–5.1)
LYMPHOCYTES RELATIVE PERCENT: 12.7 %
MCH RBC QN AUTO: 34.4 PG (ref 26–34)
MCHC RBC AUTO-ENTMCNC: 34.6 G/DL (ref 31–36)
MCV RBC AUTO: 99.6 FL (ref 80–100)
MONOCYTES ABSOLUTE: 0.6 K/UL (ref 0–1.3)
MONOCYTES RELATIVE PERCENT: 8.8 %
NEUTROPHILS ABSOLUTE: 5.6 K/UL (ref 1.7–7.7)
NEUTROPHILS RELATIVE PERCENT: 76.1 %
PDW BLD-RTO: 13.7 % (ref 12.4–15.4)
PLATELET # BLD: 183 K/UL (ref 135–450)
PMV BLD AUTO: 8.8 FL (ref 5–10.5)
POTASSIUM SERPL-SCNC: 4.8 MMOL/L (ref 3.5–5.1)
PRO-BNP: 295 PG/ML (ref 0–449)
RBC # BLD: 3.8 M/UL (ref 4.2–5.9)
SODIUM BLD-SCNC: 120 MMOL/L (ref 136–145)
T4 FREE: 1.8 NG/DL (ref 0.9–1.8)
TOTAL PROTEIN: 7.3 G/DL (ref 6.4–8.2)
TSH REFLEX FT4: 2.14 UIU/ML (ref 0.27–4.2)
VITAMIN B-12: 745 PG/ML (ref 211–911)
WBC # BLD: 7.4 K/UL (ref 4–11)

## 2020-09-09 PROCEDURE — 4040F PNEUMOC VAC/ADMIN/RCVD: CPT | Performed by: PHYSICIAN ASSISTANT

## 2020-09-09 PROCEDURE — 1123F ACP DISCUSS/DSCN MKR DOCD: CPT | Performed by: PHYSICIAN ASSISTANT

## 2020-09-09 PROCEDURE — 99213 OFFICE O/P EST LOW 20 MIN: CPT | Performed by: PHYSICIAN ASSISTANT

## 2020-09-09 PROCEDURE — G8427 DOCREV CUR MEDS BY ELIG CLIN: HCPCS | Performed by: PHYSICIAN ASSISTANT

## 2020-09-09 PROCEDURE — G8417 CALC BMI ABV UP PARAM F/U: HCPCS | Performed by: PHYSICIAN ASSISTANT

## 2020-09-09 PROCEDURE — 1036F TOBACCO NON-USER: CPT | Performed by: PHYSICIAN ASSISTANT

## 2020-09-09 RX ORDER — GUAIFENESIN 600 MG/1
1200 TABLET, EXTENDED RELEASE ORAL EVERY 12 HOURS
Qty: 56 TABLET | Refills: 0 | Status: SHIPPED | OUTPATIENT
Start: 2020-09-09 | End: 2020-09-18

## 2020-09-09 RX ORDER — AZITHROMYCIN 500 MG/1
500 TABLET, FILM COATED ORAL DAILY
Qty: 3 TABLET | Refills: 0 | Status: ON HOLD
Start: 2020-09-09 | End: 2020-09-12 | Stop reason: HOSPADM

## 2020-09-09 NOTE — PROGRESS NOTES
9/10/2020    [facility] [place]    Chief Complaint   Patient presents with    Blood Pressure Check     bp issues, bp is too high, no energy, sleeps all the time     Cough     wet cough, heavy mucus in throat        HPI  History obtained from the patient and his wife. Tiffanie Hernandez is a 79 y.o. male who presents today for fatigue and cough. Fatigue - The patient reports hypersomnolence X 2 days, stating that he has been sleeping all the time, much more than usual.  He states that he also feels \"unsteady on my feet. \"  He states that he usually walks 4 miles each day, but over the last week, he can only make it 2 miles. \"I couldn't even do 1 mile now. \"  The patient is concerned that this is caused by an issue with his blood pressure, and he has been taking his blood pressure at home using an arm cuff and getting varied readings (home blood pressure log pictured below). Denies headache, vision changes. Denies chest pain or palpitations. Denies history of heart issues. Cough - Patient also complains of an intermittent cough for the last year. Patient has a history of COPD. Cough is productive of thick clear sputum. Denies fevers, chills. He has seen an ENT, who gave him Astelin nasal spray, which she reports does not help very much with the cough. He does not see a pulmonologist.        REVIEW OF SYMPTOMS  Review of Systems   Constitutional: Positive for fatigue. Negative for chills and fever. HENT: Negative for ear pain, rhinorrhea and sore throat. Eyes: Negative for pain and visual disturbance. Respiratory: Positive for cough. Negative for shortness of breath. Cardiovascular: Negative for chest pain and palpitations. Gastrointestinal: Negative for abdominal pain, constipation, diarrhea, nausea and vomiting. Genitourinary: Negative for dysuria, frequency and urgency. Skin: Negative for rash. Neurological: Negative for dizziness and syncope. Psychiatric/Behavioral: Negative for suicidal ideas. The patient is not nervous/anxious. PAST MEDICAL HISTORY  Past Medical History:   Diagnosis Date    Acid reflux     Arthritis     \"Wrists\"    Benign prostatic hyperplasia     without outflow obstruction    BPH (benign prostatic hyperplasia)     [facility]     Dizziness     DVT (deep venous thrombosis) ([facility])     Right Leg In Late , Right Leg In     Enlarged prostate     Essential hypertension     Glaucoma     Bilateral Eyes    Gout     [place] (hard of hearing)     Bilateral Hearing Aids    Hx of blood clots Dx Late  And     \"DVT Right Leg\"    Hypertension     Impaired glucose tolerance     Pneumonia 5/15/2018    Slow urinary stream     Syncope     with loss of consciousness    Teeth missing     Upper And Lower    Wears glasses        FAMILY HISTORY  Family History   Problem Relation Age of Onset    Dementia Mother     Diabetes type 2  Father        SOCIAL HISTORY  Social History     Socioeconomic History    Marital status:      Spouse name: Not on file    Number of children: Not on file    Years of education: Not on file    Highest education level: Not on file   Occupational History    Not on file   Social Needs    Financial resource strain: Not on file    Food insecurity     Worry: Not on file     Inability: Not on file    Transportation needs     Medical: Not on file     Non-medical: Not on file   Tobacco Use    Smoking status: Former Smoker     Packs/day: 1.00     Years: 29.00     Pack years: 29.00     Types: Cigarettes, Pipe     Start date:      Last attempt to quit:      Years since quittin.7    Smokeless tobacco: Never Used   Substance and Sexual Activity    Alcohol use:  Yes     Alcohol/week: 2.0 - 3.0 standard drinks     Types: 2 - 3 Glasses of wine per week     Comment: \"2 Glasses Of Wine Daily\"    Drug use: No    Sexual activity: Not Currently   Lifestyle    Physical activity     Days per week: Not on file     Minutes per session: Not on file    Stress: Not on file   Relationships    Social connections     Talks on phone: Not on file     Gets together: Not on file     Attends Quaker service: Not on file     Active member of club or organization: Not on file     Attends meetings of clubs or organizations: Not on file     Relationship status: Not on file    Intimate partner violence     Fear of current or ex partner: Not on file     Emotionally abused: Not on file     Physically abused: Not on file     Forced sexual activity: Not on file   Other Topics Concern    Not on file   Social History Narrative    Not on file        SURGICAL HISTORY  Past Surgical History:   Procedure Laterality Date    ABDOMINAL EXPLORATION SURGERY  1984    \"Intestines Were Tangled, Had Salmonella Poisoning\"    APPENDECTOMY      COLONOSCOPY  2000's    DENTAL SURGERY      Teeth Extracted In Past    EYE SURGERY Right 1990's    Cataract With Lens Implant    EYE SURGERY Left 2016    Cataract With Lens Implant    INGUINAL HERNIA REPAIR Right 2000    With Mesh    INGUINAL HERNIA REPAIR Right 02/28/2018    With mesh and plug    VASECTOMY  1984       CURRENT MEDICATIONS  Current Outpatient Medications   Medication Sig Dispense Refill    azithromycin (ZITHROMAX) 500 MG tablet Take 1 tablet by mouth daily for 3 days 3 tablet 0    guaiFENesin (MUCINEX) 600 MG extended release tablet Take 2 tablets by mouth every 12 hours for 14 days Drink plenty of water. 56 tablet 0    celecoxib (CELEBREX) 400 MG capsule Take 1 capsule by mouth 2 times daily 60 capsule 1    zoster recombinant adjuvanted vaccine (SHINGRIX) 50 MCG/0.5ML SUSR injection 50 MCG IM then repeat 2-6 months.  0.5 mL 1    lisinopril (PRINIVIL;ZESTRIL) 10 MG tablet Take 1 tablet by mouth daily 90 tablet 1    finasteride (PROSCAR) 5 MG tablet Take 1 tablet by mouth daily 90 tablet 1    amLODIPine (NORVASC) 5 MG tablet Take 1 tablet by mouth daily 90 tablet 1  allopurinol (ZYLOPRIM) 100 MG tablet Take 1 tablet by mouth daily 90 tablet 1    ADVAIR DISKUS 250-50 MCG/DOSE AEPB Inhale 1 puff into the lungs 2 times daily 3 Inhaler 1    lansoprazole (PREVACID) 30 MG delayed release capsule Take 30 mg by mouth daily      alfuzosin (UROXATRAL) 10 MG extended release tablet Take 10 mg by mouth daily      predniSONE (DELTASONE) 5 MG tablet Take 5 mg by mouth daily        No current facility-administered medications for this visit. ALLERGIES  No Known Allergies    RECENT LABS    Lab Results   Component Value Date    LABA1C 5.8 08/10/2020     Lab Results   Component Value Date    .8 08/10/2020       Lab Results   Component Value Date    CHOL 136 08/10/2020     Lab Results   Component Value Date    LDLCALC 49 08/10/2020       Lab Results   Component Value Date    WBC 7.4 09/09/2020    HGB 13.1 (L) 09/09/2020    HCT 37.9 (L) 09/09/2020    MCV 99.6 09/09/2020     09/09/2020       PHYSICAL EXAM  /64 (Site: Left Upper Arm, Position: Sitting, Cuff Size: Medium Adult)   Pulse 79   Temp 97.3 °F (36.3 °C)   Ht 6' (1.829 m)   Wt 189 lb 6.4 oz (85.9 kg)   SpO2 95%   BMI 25.69 kg/m²     Physical Exam  Constitutional:       Appearance: Normal appearance. HENT:      Head: Normocephalic and atraumatic. Eyes:      Comments: EOM grossly intact. Cardiovascular:      Rate and Rhythm: Normal rate and regular rhythm. Heart sounds: No murmur. No friction rub. No gallop. Pulmonary:      Effort: Pulmonary effort is normal.      Breath sounds: Normal breath sounds. No wheezing, rhonchi or rales. Skin:     General: Skin is warm and dry. Neurological:      Mental Status: He is alert and oriented to person, place, and time. Comments: Cranial nerves II-XII grossly intact   Psychiatric:         Mood and Affect: Mood normal.         Behavior: Behavior normal.         ASSESSMENT & PLAN  1.  Fatigue, unspecified type  Patient's last CMP on 8/10/2020 showed low sodium at 128, low chloride at 92, elevated potassium at 5.3. I explained to the patient that electrolyte abnormalities can lead to symptoms like those he is experiencing. I will recheck his CMP, as well as a CBC, vitamin B12 level, and thyroid labs, and advise based on findings. - Comprehensive Metabolic Panel; Future  - CBC Auto Differential; Future  - BRAIN NATRIURETIC PEPTIDE (BNP); Future  - Vitamin B12; Future  - TSH WITH REFLEX TO FT4; Future  - T4, Free; Future    2. Cough  Patient complains of intermittent cough over the last year. Prescribed azithromycin and Mucinex. Ordered chest x-ray and will advise based on findings. - azithromycin (ZITHROMAX) 500 MG tablet; Take 1 tablet by mouth daily for 3 days  Dispense: 3 tablet; Refill: 0  - guaiFENesin (MUCINEX) 600 MG extended release tablet; Take 2 tablets by mouth every 12 hours for 14 days Drink plenty of water. Dispense: 56 tablet; Refill: 0  - XR CHEST (2 VW); Future          No follow-ups on file.             Electronically signed by Negro Dickerson PA-C on 9/10/2020

## 2020-09-10 ENCOUNTER — APPOINTMENT (OUTPATIENT)
Dept: CT IMAGING | Age: 80
DRG: 644 | End: 2020-09-10
Payer: MEDICARE

## 2020-09-10 ENCOUNTER — APPOINTMENT (OUTPATIENT)
Dept: GENERAL RADIOLOGY | Age: 80
DRG: 644 | End: 2020-09-10
Payer: MEDICARE

## 2020-09-10 ENCOUNTER — HOSPITAL ENCOUNTER (INPATIENT)
Age: 80
LOS: 2 days | Discharge: HOME | DRG: 644 | End: 2020-09-12
Attending: EMERGENCY MEDICINE | Admitting: INTERNAL MEDICINE
Payer: MEDICARE

## 2020-09-10 PROBLEM — E87.1 HYPONATREMIA: Status: ACTIVE | Noted: 2020-09-10

## 2020-09-10 LAB
ALBUMIN SERPL-MCNC: 3.7 GM/DL (ref 3.4–5)
ALP BLD-CCNC: 50 IU/L (ref 40–129)
ALT SERPL-CCNC: 15 U/L (ref 10–40)
ANION GAP SERPL CALCULATED.3IONS-SCNC: 12 MMOL/L (ref 4–16)
ANION GAP SERPL CALCULATED.3IONS-SCNC: 14 MMOL/L (ref 4–16)
AST SERPL-CCNC: 22 IU/L (ref 15–37)
BACTERIA: NEGATIVE /HPF
BASOPHILS ABSOLUTE: 0 K/CU MM
BASOPHILS RELATIVE PERCENT: 0.6 % (ref 0–1)
BILIRUB SERPL-MCNC: 1 MG/DL (ref 0–1)
BILIRUBIN URINE: NEGATIVE MG/DL
BLOOD, URINE: NEGATIVE
BUN BLDV-MCNC: 9 MG/DL (ref 6–23)
BUN BLDV-MCNC: 9 MG/DL (ref 6–23)
CALCIUM SERPL-MCNC: 8.3 MG/DL (ref 8.3–10.6)
CALCIUM SERPL-MCNC: 8.8 MG/DL (ref 8.3–10.6)
CHLORIDE BLD-SCNC: 81 MMOL/L (ref 99–110)
CHLORIDE BLD-SCNC: 82 MMOL/L (ref 99–110)
CHOLESTEROL: 129 MG/DL
CLARITY: CLEAR
CO2: 20 MMOL/L (ref 21–32)
CO2: 23 MMOL/L (ref 21–32)
COLOR: YELLOW
CREAT SERPL-MCNC: 0.8 MG/DL (ref 0.9–1.3)
CREAT SERPL-MCNC: 0.8 MG/DL (ref 0.9–1.3)
DIFFERENTIAL TYPE: ABNORMAL
EOSINOPHILS ABSOLUTE: 0.2 K/CU MM
EOSINOPHILS RELATIVE PERCENT: 2.2 % (ref 0–3)
GFR AFRICAN AMERICAN: >60 ML/MIN/1.73M2
GFR AFRICAN AMERICAN: >60 ML/MIN/1.73M2
GFR NON-AFRICAN AMERICAN: >60 ML/MIN/1.73M2
GFR NON-AFRICAN AMERICAN: >60 ML/MIN/1.73M2
GLUCOSE BLD-MCNC: 89 MG/DL (ref 70–99)
GLUCOSE BLD-MCNC: 97 MG/DL (ref 70–99)
GLUCOSE, URINE: NEGATIVE MG/DL
HCT VFR BLD CALC: 35.9 % (ref 42–52)
HDLC SERPL-MCNC: 98 MG/DL
HEMOGLOBIN: 12.7 GM/DL (ref 13.5–18)
IMMATURE NEUTROPHIL %: 0.3 % (ref 0–0.43)
KETONES, URINE: ABNORMAL MG/DL
LDL CHOLESTEROL DIRECT: 30 MG/DL
LEUKOCYTE ESTERASE, URINE: ABNORMAL
LYMPHOCYTES ABSOLUTE: 1 K/CU MM
LYMPHOCYTES RELATIVE PERCENT: 14.5 % (ref 24–44)
MAGNESIUM: 1.6 MG/DL (ref 1.8–2.4)
MCH RBC QN AUTO: 33.4 PG (ref 27–31)
MCHC RBC AUTO-ENTMCNC: 35.4 % (ref 32–36)
MCV RBC AUTO: 94.5 FL (ref 78–100)
MONOCYTES ABSOLUTE: 0.8 K/CU MM
MONOCYTES RELATIVE PERCENT: 11.5 % (ref 0–4)
MUCUS: ABNORMAL HPF
NITRITE URINE, QUANTITATIVE: NEGATIVE
NUCLEATED RBC %: 0 %
PDW BLD-RTO: 12.6 % (ref 11.7–14.9)
PH, URINE: 6 (ref 5–8)
PLATELET # BLD: 182 K/CU MM (ref 140–440)
PMV BLD AUTO: 9.8 FL (ref 7.5–11.1)
POTASSIUM SERPL-SCNC: 4.2 MMOL/L (ref 3.5–5.1)
POTASSIUM SERPL-SCNC: 4.5 MMOL/L (ref 3.5–5.1)
PRO-BNP: 332.3 PG/ML
PROTEIN UA: NEGATIVE MG/DL
RBC # BLD: 3.8 M/CU MM (ref 4.6–6.2)
RBC URINE: 1 /HPF (ref 0–3)
SEGMENTED NEUTROPHILS ABSOLUTE COUNT: 4.8 K/CU MM
SEGMENTED NEUTROPHILS RELATIVE PERCENT: 70.9 % (ref 36–66)
SODIUM BLD-SCNC: 116 MMOL/L (ref 135–145)
SODIUM BLD-SCNC: 116 MMOL/L (ref 135–145)
SPECIFIC GRAVITY UA: 1.01 (ref 1–1.03)
SQUAMOUS EPITHELIAL: 3 /HPF
TOTAL IMMATURE NEUTOROPHIL: 0.02 K/CU MM
TOTAL NUCLEATED RBC: 0 K/CU MM
TOTAL PROTEIN: 7.6 GM/DL (ref 6.4–8.2)
TRICHOMONAS: ABNORMAL /HPF
TRIGL SERPL-MCNC: 34 MG/DL
TROPONIN T: <0.01 NG/ML
TSH HIGH SENSITIVITY: 2.2 UIU/ML (ref 0.27–4.2)
UROBILINOGEN, URINE: NORMAL MG/DL (ref 0.2–1)
WBC # BLD: 6.8 K/CU MM (ref 4–10.5)
WBC UA: 1 /HPF (ref 0–2)

## 2020-09-10 PROCEDURE — 85025 COMPLETE CBC W/AUTO DIFF WBC: CPT

## 2020-09-10 PROCEDURE — 93010 ELECTROCARDIOGRAM REPORT: CPT | Performed by: INTERNAL MEDICINE

## 2020-09-10 PROCEDURE — 83880 ASSAY OF NATRIURETIC PEPTIDE: CPT

## 2020-09-10 PROCEDURE — 1200000000 HC SEMI PRIVATE

## 2020-09-10 PROCEDURE — 6360000004 HC RX CONTRAST MEDICATION: Performed by: PHYSICIAN ASSISTANT

## 2020-09-10 PROCEDURE — 36415 COLL VENOUS BLD VENIPUNCTURE: CPT

## 2020-09-10 PROCEDURE — 71260 CT THORAX DX C+: CPT

## 2020-09-10 PROCEDURE — 96365 THER/PROPH/DIAG IV INF INIT: CPT

## 2020-09-10 PROCEDURE — 84484 ASSAY OF TROPONIN QUANT: CPT

## 2020-09-10 PROCEDURE — 81001 URINALYSIS AUTO W/SCOPE: CPT

## 2020-09-10 PROCEDURE — 2580000003 HC RX 258: Performed by: PHYSICIAN ASSISTANT

## 2020-09-10 PROCEDURE — 93005 ELECTROCARDIOGRAM TRACING: CPT | Performed by: PHYSICIAN ASSISTANT

## 2020-09-10 PROCEDURE — 96361 HYDRATE IV INFUSION ADD-ON: CPT

## 2020-09-10 PROCEDURE — 83735 ASSAY OF MAGNESIUM: CPT

## 2020-09-10 PROCEDURE — 80053 COMPREHEN METABOLIC PANEL: CPT

## 2020-09-10 PROCEDURE — 96366 THER/PROPH/DIAG IV INF ADDON: CPT

## 2020-09-10 PROCEDURE — 83721 ASSAY OF BLOOD LIPOPROTEIN: CPT

## 2020-09-10 PROCEDURE — 6370000000 HC RX 637 (ALT 250 FOR IP): Performed by: INTERNAL MEDICINE

## 2020-09-10 PROCEDURE — 84540 ASSAY OF URINE/UREA-N: CPT

## 2020-09-10 PROCEDURE — 99285 EMERGENCY DEPT VISIT HI MDM: CPT

## 2020-09-10 PROCEDURE — 6360000002 HC RX W HCPCS: Performed by: EMERGENCY MEDICINE

## 2020-09-10 PROCEDURE — 71046 X-RAY EXAM CHEST 2 VIEWS: CPT

## 2020-09-10 PROCEDURE — 94640 AIRWAY INHALATION TREATMENT: CPT

## 2020-09-10 PROCEDURE — 94761 N-INVAS EAR/PLS OXIMETRY MLT: CPT

## 2020-09-10 PROCEDURE — 80061 LIPID PANEL: CPT

## 2020-09-10 PROCEDURE — 80048 BASIC METABOLIC PNL TOTAL CA: CPT

## 2020-09-10 PROCEDURE — 84443 ASSAY THYROID STIM HORMONE: CPT

## 2020-09-10 RX ORDER — PANTOPRAZOLE SODIUM 40 MG/1
40 TABLET, DELAYED RELEASE ORAL
Status: DISCONTINUED | OUTPATIENT
Start: 2020-09-11 | End: 2020-09-12 | Stop reason: HOSPADM

## 2020-09-10 RX ORDER — THIAMINE MONONITRATE (VIT B1) 100 MG
100 TABLET ORAL DAILY
Status: DISCONTINUED | OUTPATIENT
Start: 2020-09-10 | End: 2020-09-12 | Stop reason: HOSPADM

## 2020-09-10 RX ORDER — LORAZEPAM 1 MG/1
2 TABLET ORAL
Status: DISCONTINUED | OUTPATIENT
Start: 2020-09-10 | End: 2020-09-12 | Stop reason: HOSPADM

## 2020-09-10 RX ORDER — LANSOPRAZOLE 30 MG/1
30 CAPSULE, DELAYED RELEASE ORAL DAILY
Status: DISCONTINUED | OUTPATIENT
Start: 2020-09-11 | End: 2020-09-10 | Stop reason: CLARIF

## 2020-09-10 RX ORDER — SODIUM CHLORIDE 0.9 % (FLUSH) 0.9 %
10 SYRINGE (ML) INJECTION PRN
Status: DISCONTINUED | OUTPATIENT
Start: 2020-09-10 | End: 2020-09-12 | Stop reason: HOSPADM

## 2020-09-10 RX ORDER — SODIUM CHLORIDE 0.9 % (FLUSH) 0.9 %
10 SYRINGE (ML) INJECTION EVERY 12 HOURS SCHEDULED
Status: DISCONTINUED | OUTPATIENT
Start: 2020-09-10 | End: 2020-09-12 | Stop reason: HOSPADM

## 2020-09-10 RX ORDER — SODIUM CHLORIDE 9 MG/ML
INJECTION, SOLUTION INTRAVENOUS CONTINUOUS
Status: DISCONTINUED | OUTPATIENT
Start: 2020-09-10 | End: 2020-09-10

## 2020-09-10 RX ORDER — LORAZEPAM 2 MG/ML
1 INJECTION INTRAMUSCULAR
Status: DISCONTINUED | OUTPATIENT
Start: 2020-09-10 | End: 2020-09-12 | Stop reason: HOSPADM

## 2020-09-10 RX ORDER — LORAZEPAM 1 MG/1
3 TABLET ORAL
Status: DISCONTINUED | OUTPATIENT
Start: 2020-09-10 | End: 2020-09-12 | Stop reason: HOSPADM

## 2020-09-10 RX ORDER — LORAZEPAM 2 MG/ML
2 INJECTION INTRAMUSCULAR
Status: DISCONTINUED | OUTPATIENT
Start: 2020-09-10 | End: 2020-09-12 | Stop reason: HOSPADM

## 2020-09-10 RX ORDER — ONDANSETRON 2 MG/ML
4 INJECTION INTRAMUSCULAR; INTRAVENOUS EVERY 6 HOURS PRN
Status: DISCONTINUED | OUTPATIENT
Start: 2020-09-10 | End: 2020-09-12 | Stop reason: HOSPADM

## 2020-09-10 RX ORDER — TAMSULOSIN HYDROCHLORIDE 0.4 MG/1
0.4 CAPSULE ORAL DAILY
Status: DISCONTINUED | OUTPATIENT
Start: 2020-09-10 | End: 2020-09-12 | Stop reason: HOSPADM

## 2020-09-10 RX ORDER — AMLODIPINE BESYLATE 5 MG/1
5 TABLET ORAL DAILY
Status: DISCONTINUED | OUTPATIENT
Start: 2020-09-11 | End: 2020-09-11

## 2020-09-10 RX ORDER — MAGNESIUM SULFATE IN WATER 40 MG/ML
2 INJECTION, SOLUTION INTRAVENOUS ONCE
Status: COMPLETED | OUTPATIENT
Start: 2020-09-10 | End: 2020-09-10

## 2020-09-10 RX ORDER — LORAZEPAM 1 MG/1
4 TABLET ORAL
Status: DISCONTINUED | OUTPATIENT
Start: 2020-09-10 | End: 2020-09-12 | Stop reason: HOSPADM

## 2020-09-10 RX ORDER — AMLODIPINE BESYLATE 5 MG/1
10 TABLET ORAL DAILY
Status: DISCONTINUED | OUTPATIENT
Start: 2020-09-11 | End: 2020-09-11

## 2020-09-10 RX ORDER — LORAZEPAM 2 MG/ML
4 INJECTION INTRAMUSCULAR
Status: DISCONTINUED | OUTPATIENT
Start: 2020-09-10 | End: 2020-09-12 | Stop reason: HOSPADM

## 2020-09-10 RX ORDER — ACETAMINOPHEN 650 MG/1
650 SUPPOSITORY RECTAL EVERY 6 HOURS PRN
Status: DISCONTINUED | OUTPATIENT
Start: 2020-09-10 | End: 2020-09-12 | Stop reason: HOSPADM

## 2020-09-10 RX ORDER — ACETAMINOPHEN 325 MG/1
650 TABLET ORAL EVERY 6 HOURS PRN
Status: DISCONTINUED | OUTPATIENT
Start: 2020-09-10 | End: 2020-09-12 | Stop reason: HOSPADM

## 2020-09-10 RX ORDER — ALLOPURINOL 100 MG/1
100 TABLET ORAL DAILY
Status: DISCONTINUED | OUTPATIENT
Start: 2020-09-11 | End: 2020-09-12 | Stop reason: HOSPADM

## 2020-09-10 RX ORDER — POLYETHYLENE GLYCOL 3350 17 G/17G
17 POWDER, FOR SOLUTION ORAL DAILY PRN
Status: DISCONTINUED | OUTPATIENT
Start: 2020-09-10 | End: 2020-09-12 | Stop reason: HOSPADM

## 2020-09-10 RX ORDER — PROMETHAZINE HYDROCHLORIDE 25 MG/1
12.5 TABLET ORAL EVERY 6 HOURS PRN
Status: DISCONTINUED | OUTPATIENT
Start: 2020-09-10 | End: 2020-09-12 | Stop reason: HOSPADM

## 2020-09-10 RX ORDER — LORAZEPAM 2 MG/ML
3 INJECTION INTRAMUSCULAR
Status: DISCONTINUED | OUTPATIENT
Start: 2020-09-10 | End: 2020-09-12 | Stop reason: HOSPADM

## 2020-09-10 RX ORDER — LORAZEPAM 1 MG/1
1 TABLET ORAL
Status: DISCONTINUED | OUTPATIENT
Start: 2020-09-10 | End: 2020-09-12 | Stop reason: HOSPADM

## 2020-09-10 RX ORDER — BUDESONIDE AND FORMOTEROL FUMARATE DIHYDRATE 160; 4.5 UG/1; UG/1
2 AEROSOL RESPIRATORY (INHALATION) 2 TIMES DAILY
Status: DISCONTINUED | OUTPATIENT
Start: 2020-09-10 | End: 2020-09-12 | Stop reason: HOSPADM

## 2020-09-10 RX ORDER — SODIUM CHLORIDE 9 MG/ML
INJECTION, SOLUTION INTRAVENOUS CONTINUOUS
Status: DISCONTINUED | OUTPATIENT
Start: 2020-09-10 | End: 2020-09-11

## 2020-09-10 RX ORDER — LISINOPRIL 10 MG/1
10 TABLET ORAL DAILY
Status: DISCONTINUED | OUTPATIENT
Start: 2020-09-11 | End: 2020-09-12 | Stop reason: HOSPADM

## 2020-09-10 RX ORDER — FINASTERIDE 5 MG/1
5 TABLET, FILM COATED ORAL DAILY
Status: DISCONTINUED | OUTPATIENT
Start: 2020-09-11 | End: 2020-09-12 | Stop reason: HOSPADM

## 2020-09-10 RX ADMIN — Medication 100 MG: at 18:28

## 2020-09-10 RX ADMIN — IOPAMIDOL 75 ML: 755 INJECTION, SOLUTION INTRAVENOUS at 17:24

## 2020-09-10 RX ADMIN — MAGNESIUM SULFATE 2 G: 2 INJECTION INTRAVENOUS at 15:39

## 2020-09-10 RX ADMIN — SODIUM CHLORIDE 1 ML: 9 INJECTION, SOLUTION INTRAVENOUS at 14:46

## 2020-09-10 RX ADMIN — TAMSULOSIN HYDROCHLORIDE 0.4 MG: 0.4 CAPSULE ORAL at 20:45

## 2020-09-10 RX ADMIN — BUDESONIDE AND FORMOTEROL FUMARATE DIHYDRATE 2 PUFF: 160; 4.5 AEROSOL RESPIRATORY (INHALATION) at 21:59

## 2020-09-10 ASSESSMENT — ENCOUNTER SYMPTOMS
NAUSEA: 0
SORE THROAT: 0
SHORTNESS OF BREATH: 0
DIARRHEA: 0
COUGH: 1
ABDOMINAL PAIN: 0
VOMITING: 0
RHINORRHEA: 0
CONSTIPATION: 0
EYE PAIN: 0

## 2020-09-10 NOTE — ED PROVIDER NOTES
EMERGENCY DEPARTMENT ENCOUNTER      PCP: Esteban Segura MD    CHIEF COMPLAINT    Chief Complaint   Patient presents with    Other     sent for low sodium by Dr Cinthya Gomes, light headed, general weakness and fatigue gradual for several months worse this week             Of note, this patient was also evaluated by the attending physician, Dr. Freida Jennings. HPI    Tigre Wallace is a 79 y.o. male who presents with hyponatremia. Exact onset unknown, however patient states he was at PCP yesterday and had labs drawn. He was notified today that his sodium was low and told to come to the emergency department. Patient states for the past several weeks he has been tired without focal weakness, numbness, tingling. Patient also notes a nonproductive cough for 3 to 4 weeks for which he followed with ENT and was told that he did not have evidence of infection & that he is having \"usual changes of getting older\". Patient does have evidence of COPD. He denies new wheezes or shortness of breath recently. Denies chest pain. REVIEW OF SYSTEMS    Constitutional:  Denies fever, chills, weight loss or focal weakness   HENT:  Denies sore throat or ear pain   Cardiovascular:  Denies chest pain, palpitations   Respiratory: See HPI.   GI:  Denies abdominal pain, nausea, vomiting, or diarrhea  :  Denies any urinary symptoms   Musculoskeletal:  Denies back pain  Skin:  Denies rash  Neurologic:  Denies headache, focal weakness or sensory changes   Endocrine:  Denies polyuria or polydypsia   Lymphatic:  Denies swollen glands     All other review of systems are negative  See HPI and nursing notes for additional information     PAST MEDICAL AND SURGICAL HISTORY    Past Medical History:   Diagnosis Date    Acid reflux     Arthritis     \"Wrists\"    Benign prostatic hyperplasia     without outflow obstruction    BPH (benign prostatic hyperplasia)     COPD (chronic obstructive pulmonary disease) (HCC)     Dizziness  DVT (deep venous thrombosis) (HCC)     Right Leg In Late 1980's, Right Leg In 2002    Enlarged prostate     Essential hypertension     Glaucoma     Bilateral Eyes    Gout     [place] (hard of hearing)     Bilateral Hearing Aids    Hx of blood clots Dx Late 1980's And 2002    \"DVT Right Leg\"    Hypertension     Impaired glucose tolerance     Pneumonia 5/15/2018    Slow urinary stream     Syncope     with loss of consciousness    Teeth missing     Upper And Lower    Wears glasses      Past Surgical History:   Procedure Laterality Date    ABDOMINAL EXPLORATION SURGERY  1984    \"Intestines Were Tangled, Had Salmonella Poisoning\"    APPENDECTOMY      COLONOSCOPY  2000's    DENTAL SURGERY      Teeth Extracted In Past    EYE SURGERY Right 1990's    Cataract With Lens Implant    EYE SURGERY Left 2016    Cataract With Lens Implant    INGUINAL HERNIA REPAIR Right 2000    With Mesh    INGUINAL HERNIA REPAIR Right 02/28/2018    With mesh and plug    VASECTOMY  1984       CURRENT MEDICATIONS    Current Outpatient Rx   Medication Sig Dispense Refill    azithromycin (ZITHROMAX) 500 MG tablet Take 1 tablet by mouth daily for 3 days 3 tablet 0    guaiFENesin (MUCINEX) 600 MG extended release tablet Take 2 tablets by mouth every 12 hours for 14 days Drink plenty of water. 56 tablet 0    celecoxib (CELEBREX) 400 MG capsule Take 1 capsule by mouth 2 times daily 60 capsule 1    zoster recombinant adjuvanted vaccine (SHINGRIX) 50 MCG/0.5ML SUSR injection 50 MCG IM then repeat 2-6 months.  0.5 mL 1    lisinopril (PRINIVIL;ZESTRIL) 10 MG tablet Take 1 tablet by mouth daily 90 tablet 1    finasteride (PROSCAR) 5 MG tablet Take 1 tablet by mouth daily 90 tablet 1    amLODIPine (NORVASC) 5 MG tablet Take 1 tablet by mouth daily 90 tablet 1    allopurinol (ZYLOPRIM) 100 MG tablet Take 1 tablet by mouth daily 90 tablet 1    ADVAIR DISKUS 250-50 MCG/DOSE AEPB Inhale 1 puff into the lungs 2 times daily 3 Inhaler 1 (36.4 °C) (Oral)   Resp 14   Ht 6' (1.829 m)   Wt 180 lb (81.6 kg)   SpO2 95%   BMI 24.41 kg/m²    Constitutional:  Well developed, Well nourished. No distress. Tired appearing. HENT:  Normocephalic, Atraumatic, PERRL. EOMI. Sclera clear. Conjunctiva normal, No discharge. Neck/Lymphatics: supple, no JVD, no swollen nodes  Cardiovascular:   RRR,  no murmurs/rubs/gallops. No JVD    Respiratory:  Nonlabored breathing. Normal breath sounds, No wheezing  Abdomen: Bowel sounds normal, Soft, No tenderness, no masses. Musculoskeletal:    There is no edema, asymmetry, or calf / thigh tenderness bilaterally. No cyanosis. No cool or pale-appearing limb. Distal cap refill and pulses intact bilateral upper and lower extremities  Bilateral upper and lower extremity ROM intact without pain or obvious deficit  Integument:  Warm, Dry  Neurologic: Alert & oriented , No focal deficits noted. Cranial nerves II through XII grossly intact. Patient appears tired, otherwise no focal deficits noted. Normal gross motor coordination & motor strength bilateral upper and lower extremities  Sensation intact.     Psychiatric:  Affect normal, Mood normal.       Labs:  Results for orders placed or performed during the hospital encounter of 09/10/20   CBC auto diff   Result Value Ref Range    WBC 6.8 4.0 - 10.5 K/CU MM    RBC 3.80 (L) 4.6 - 6.2 M/CU MM    Hemoglobin 12.7 (L) 13.5 - 18.0 GM/DL    Hematocrit 35.9 (L) 42 - 52 %    MCV 94.5 78 - 100 FL    MCH 33.4 (H) 27 - 31 PG    MCHC 35.4 32.0 - 36.0 %    RDW 12.6 11.7 - 14.9 %    Platelets 326 307 - 338 K/CU MM    MPV 9.8 7.5 - 11.1 FL    Differential Type AUTOMATED DIFFERENTIAL     Segs Relative 70.9 (H) 36 - 66 %    Lymphocytes % 14.5 (L) 24 - 44 %    Monocytes % 11.5 (H) 0 - 4 %    Eosinophils % 2.2 0 - 3 %    Basophils % 0.6 0 - 1 %    Segs Absolute 4.8 K/CU MM    Lymphocytes Absolute 1.0 K/CU MM    Monocytes Absolute 0.8 K/CU MM    Eosinophils Absolute 0.2 K/CU MM Basophils Absolute 0.0 K/CU MM    Nucleated RBC % 0.0 %    Total Nucleated RBC 0.0 K/CU MM    Total Immature Neutrophil 0.02 K/CU MM    Immature Neutrophil % 0.3 0 - 0.43 %   CMP   Result Value Ref Range    Sodium 116 (LL) 135 - 145 MMOL/L    Potassium 4.5 3.5 - 5.1 MMOL/L    Chloride 81 (L) 99 - 110 mMol/L    CO2 23 21 - 32 MMOL/L    BUN 9 6 - 23 MG/DL    CREATININE 0.8 (L) 0.9 - 1.3 MG/DL    Glucose 89 70 - 99 MG/DL    Calcium 8.8 8.3 - 10.6 MG/DL    Alb 3.7 3.4 - 5.0 GM/DL    Total Protein 7.6 6.4 - 8.2 GM/DL    Total Bilirubin 1.0 0.0 - 1.0 MG/DL    ALT 15 10 - 40 U/L    AST 22 15 - 37 IU/L    Alkaline Phosphatase 50 40 - 129 IU/L    GFR Non-African American >60 >60 mL/min/1.73m2    GFR African American >60 >60 mL/min/1.73m2    Anion Gap 12 4 - 16   Magnesium   Result Value Ref Range    Magnesium 1.6 (L) 1.8 - 2.4 mg/dl   Urinalysis   Result Value Ref Range    Color, UA YELLOW YELLOW    Clarity, UA CLEAR CLEAR    Glucose, Urine NEGATIVE NEGATIVE MG/DL    Bilirubin Urine NEGATIVE NEGATIVE MG/DL    Ketones, Urine MODERATE (A) NEGATIVE MG/DL    Specific Gravity, UA 1.012 1.001 - 1.035    Blood, Urine NEGATIVE NEGATIVE    pH, Urine 6.0 5.0 - 8.0    Protein, UA NEGATIVE NEGATIVE MG/DL    Urobilinogen, Urine NORMAL 0.2 - 1.0 MG/DL    Nitrite Urine, Quantitative NEGATIVE NEGATIVE    Leukocyte Esterase, Urine TRACE (A) NEGATIVE    RBC, UA 1 0 - 3 /HPF    WBC, UA 1 0 - 2 /HPF    Bacteria, UA NEGATIVE NEGATIVE /HPF    Squam Epithel, UA 3 /HPF    Mucus, UA RARE (A) NEGATIVE HPF    Trichomonas, UA NONE SEEN NONE SEEN /HPF   Troponin   Result Value Ref Range    Troponin T <0.010 <0.01 NG/ML   Brain Natriuretic Peptide   Result Value Ref Range    Pro-.3 (H) <300 PG/ML   EKG 12 Lead   Result Value Ref Range    Ventricular Rate 68 BPM    Atrial Rate 68 BPM    P-R Interval 202 ms    QRS Duration 152 ms    Q-T Interval 440 ms    QTc Calculation (Bazett) 467 ms    P Axis 37 degrees    R Axis 49 degrees    T Axis -9 degrees

## 2020-09-10 NOTE — H&P
smoker (1/2PPD); quitted 40 years ago. He was recently prescribed HCTZ for HTN by his PCP few weeks ago. Ten point ROS reviewed negative, unless as noted above    Objective:   No intake or output data in the 24 hours ending 09/10/20 1635   Vitals:   Vitals:    09/10/20 1432   BP: (!) 165/83   Pulse:    Resp:    Temp:    SpO2: 95%     Physical Exam:   GEN Awake male, sitting upright in bed in no apparent distress. Appears given age. EYES Pupils are equally round. No scleral erythema, discharge, or conjunctivitis. HENT Mucous membranes are moist. Oral pharynx without exudates, no evidence of thrush. NECK Supple, no apparent thyromegaly or masses. RESP Clear to auscultation, no wheezes, rales or rhonchi. Symmetric chest movement while on room air. CARDIO/VASC S1/S2 auscultated. Regular rate without appreciable murmurs, rubs, or gallops. No JVD or carotid bruits. Peripheral pulses equal bilaterally and palpable. No peripheral edema. GI Abdomen is soft without significant tenderness, masses, or guarding. Bowel sounds are normoactive. Rectal exam deferred.  No costovertebral angle tenderness. Normal appearing external genitalia. Castillo catheter is not present. HEME/LYMPH No palpable cervical lymphadenopathy and no hepatosplenomegaly. No petechiae or ecchymoses. MSK No gross joint deformities. SKIN Normal coloration, warm, dry. NEURO Cranial nerves appear grossly intact, normal speech, no lateralizing weakness. PSYCH Awake, alert, oriented x 4. Affect appropriate.     Past Medical History:      Past Medical History:   Diagnosis Date    Acid reflux     Arthritis     \"Wrists\"    Benign prostatic hyperplasia     without outflow obstruction    BPH (benign prostatic hyperplasia)     COPD (chronic obstructive pulmonary disease) (HCC)     Dizziness     DVT (deep venous thrombosis) (HCC)     Right Leg In Late 1980's, Right Leg In 2002    Enlarged prostate     Essential hypertension     Glaucoma Bilateral Eyes    Gout     [place] (hard of hearing)     Bilateral Hearing Aids    Hx of blood clots Dx Late  And     \"DVT Right Leg\"    Hypertension     Impaired glucose tolerance     Pneumonia 5/15/2018    Slow urinary stream     Syncope     with loss of consciousness    Teeth missing     Upper And Lower    Wears glasses      PSHX:  has a past surgical history that includes eye surgery (Right, ); eye surgery (Left, ); Dental surgery; Colonoscopy (); Inguinal hernia repair (Right, ); Vasectomy (); Abdominal exploration surgery (); Inguinal hernia repair (Right, 2018); and Appendectomy. Allergies: No Known Allergies    FAM HX: family history includes Dementia in his mother; Diabetes type 2  in his father. Soc HX:   Social History     Socioeconomic History    Marital status:      Spouse name: None    Number of children: None    Years of education: None    Highest education level: None   Occupational History    None   Social Needs    Financial resource strain: None    Food insecurity     Worry: None     Inability: None    Transportation needs     Medical: None     Non-medical: None   Tobacco Use    Smoking status: Former Smoker     Packs/day: 1.00     Years: 29.00     Pack years: 29.00     Types: Cigarettes, Pipe     Start date:      Last attempt to quit:      Years since quittin.7    Smokeless tobacco: Never Used   Substance and Sexual Activity    Alcohol use:  Yes     Alcohol/week: 2.0 - 3.0 standard drinks     Types: 2 - 3 Glasses of wine per week     Comment: \"2 Glasses Of Wine Daily\"    Drug use: No    Sexual activity: Not Currently   Lifestyle    Physical activity     Days per week: None     Minutes per session: None    Stress: None   Relationships    Social connections     Talks on phone: None     Gets together: None     Attends Buddhism service: None     Active member of club or organization: None     Attends meetings of clubs or organizations: None     Relationship status: None    Intimate partner violence     Fear of current or ex partner: None     Emotionally abused: None     Physically abused: None     Forced sexual activity: None   Other Topics Concern    None   Social History Narrative    None       Medications:   Medications:    magnesium sulfate  2 g Intravenous Once    sodium chloride flush  10 mL Intravenous 2 times per day    thiamine  100 mg Oral Daily      Infusions:    sodium chloride 75 mL/hr at 09/10/20 1521     PRN Meds: sodium chloride flush, 10 mL, PRN  LORazepam, 1 mg, Q1H PRN    Or  LORazepam, 1 mg, Q1H PRN    Or  LORazepam, 2 mg, Q1H PRN    Or  LORazepam, 2 mg, Q1H PRN    Or  LORazepam, 3 mg, Q1H PRN    Or  LORazepam, 3 mg, Q1H PRN    Or  LORazepam, 4 mg, Q1H PRN    Or  LORazepam, 4 mg, Q1H PRN          Electronically signed by Barbra Rodriguez MD on 9/10/2020 at 4:35 PM

## 2020-09-10 NOTE — ED PROVIDER NOTES
This EKG was interpreted by me. Rate is 68, rhythm is sinus. HI and QT intervals are within normal limits. Long QRS duration of 152. There is no ST segment or T wave changes. Right bundle branch block. T wave inversions in 3, aVF. This EKG was compared to previous EKG from date 4/8/19. Leon Haq DO  09/10/20 1528    I independently examined and evaluated Lurdes Esquivel. In brief their history revealed concerns for hyponatremia. States has been feeling generally weak, had labs drawn yesterday and was told his sodium was low. Denies any numbness, tingling. Does report a cough which is been nonproductive and ongoing for several weeks. Their focused exam revealed awake, alert, well-hydrated, well-nourished, and in no acute distress. Mucous membranes are moist. Speech is clear. Breathing is unlabored. Skin is dry. Mental status is normal. The patient has normal gait, moves all extremities, and is without facial droop. ED course: 71-year-old male who presented with concerns for hyponatremia. Was found to have a sodium of 116. No focal neurologic deficits. Awake, alert and oriented. Initiation of replacement in the emergency department of normal saline at 75 mL's an hour. Magnesium was also low at 1.6, magnesium replacement ordered as the QRS duration is long on the EKG. Does have a new pulmonary nodule, CT scan was ordered for further evaluation. We will plan for admission to the hospital for further evaluation and care. Due to the immediate potential for life-threatening deterioration due to hyponatremia, I spent 30 minutes providing critical care. This time is excluding time spent performing procedures. All diagnostic, treatment, and disposition decisions were made by myself in conjunction with the Advanced Practice Provider. For all further details of the patient's emergency department visit, please see the Advanced Practice Provider's documentation.        Leon Haq DO  09/10/20 Deshaun Kelly,   09/10/20 1553

## 2020-09-10 NOTE — CONSULTS
Pt seen ,examined,interviewed and chart reviewed. Please see the dictated consult for details     Imp :   1. Ac on ch hyponatremia- seems symptomatic for the alst 3-4 days - potential etiology recent HTCZ/ NSAID but he  also has ? pulmo nodule - if true ectopic ADH is a possibility - off course he can have other etiology too- he consumes etoh daily but seem to have good protein intake   2. underlying HTN/ RA/ VTE/ COPD etc   3. Low mg from ? HCTZ/ ? etoh ( less likely )     Plan:  1. Urine  bland   2. add urinary indices    3. Add  arginine level  4. He is getting CT will review   5. BMP q 6 h-  as he has ch process   6. Goal is not to raise na > 6-8 meq/l/24h  7. Replete   mag   8.  Follow clinically       Thanks for the consult    #[id number]

## 2020-09-11 LAB
ANION GAP SERPL CALCULATED.3IONS-SCNC: 12 MMOL/L (ref 4–16)
ANION GAP SERPL CALCULATED.3IONS-SCNC: 12 MMOL/L (ref 4–16)
ANION GAP SERPL CALCULATED.3IONS-SCNC: 13 MMOL/L (ref 4–16)
ANION GAP SERPL CALCULATED.3IONS-SCNC: 13 MMOL/L (ref 4–16)
BUN BLDV-MCNC: 10 MG/DL (ref 6–23)
CALCIUM SERPL-MCNC: 8.1 MG/DL (ref 8.3–10.6)
CALCIUM SERPL-MCNC: 8.2 MG/DL (ref 8.3–10.6)
CALCIUM SERPL-MCNC: 8.3 MG/DL (ref 8.3–10.6)
CALCIUM SERPL-MCNC: 8.5 MG/DL (ref 8.3–10.6)
CHLORIDE BLD-SCNC: 84 MMOL/L (ref 99–110)
CHLORIDE BLD-SCNC: 84 MMOL/L (ref 99–110)
CHLORIDE BLD-SCNC: 85 MMOL/L (ref 99–110)
CHLORIDE BLD-SCNC: 86 MMOL/L (ref 99–110)
CO2: 21 MMOL/L (ref 21–32)
CO2: 22 MMOL/L (ref 21–32)
CREAT SERPL-MCNC: 0.9 MG/DL (ref 0.9–1.3)
GFR AFRICAN AMERICAN: >60 ML/MIN/1.73M2
GFR NON-AFRICAN AMERICAN: >60 ML/MIN/1.73M2
GLUCOSE BLD-MCNC: 165 MG/DL (ref 70–99)
GLUCOSE BLD-MCNC: 88 MG/DL (ref 70–99)
GLUCOSE BLD-MCNC: 88 MG/DL (ref 70–99)
GLUCOSE BLD-MCNC: 95 MG/DL (ref 70–99)
MAGNESIUM: 1.9 MG/DL (ref 1.8–2.4)
OSMOLALITY URINE: 302 MOS/L (ref 292–1090)
POTASSIUM SERPL-SCNC: 3.9 MMOL/L (ref 3.5–5.1)
POTASSIUM SERPL-SCNC: 4.4 MMOL/L (ref 3.5–5.1)
POTASSIUM SERPL-SCNC: 4.4 MMOL/L (ref 3.5–5.1)
POTASSIUM SERPL-SCNC: 4.6 MMOL/L (ref 3.5–5.1)
SODIUM BLD-SCNC: 118 MMOL/L (ref 135–145)
SODIUM BLD-SCNC: 120 MMOL/L (ref 135–145)
SODIUM URINE: 64 MMOL/L (ref 35–167)

## 2020-09-11 PROCEDURE — 1200000000 HC SEMI PRIVATE

## 2020-09-11 PROCEDURE — 83735 ASSAY OF MAGNESIUM: CPT

## 2020-09-11 PROCEDURE — 94640 AIRWAY INHALATION TREATMENT: CPT

## 2020-09-11 PROCEDURE — 83935 ASSAY OF URINE OSMOLALITY: CPT

## 2020-09-11 PROCEDURE — 6370000000 HC RX 637 (ALT 250 FOR IP): Performed by: INTERNAL MEDICINE

## 2020-09-11 PROCEDURE — 80048 BASIC METABOLIC PNL TOTAL CA: CPT

## 2020-09-11 PROCEDURE — 84588 ASSAY OF VASOPRESSIN: CPT

## 2020-09-11 PROCEDURE — 6360000002 HC RX W HCPCS: Performed by: INTERNAL MEDICINE

## 2020-09-11 PROCEDURE — 36415 COLL VENOUS BLD VENIPUNCTURE: CPT

## 2020-09-11 PROCEDURE — 2580000003 HC RX 258: Performed by: INTERNAL MEDICINE

## 2020-09-11 PROCEDURE — 94762 N-INVAS EAR/PLS OXIMTRY CONT: CPT

## 2020-09-11 PROCEDURE — 84300 ASSAY OF URINE SODIUM: CPT

## 2020-09-11 PROCEDURE — 94761 N-INVAS EAR/PLS OXIMETRY MLT: CPT

## 2020-09-11 RX ADMIN — ENOXAPARIN SODIUM 30 MG: 30 INJECTION SUBCUTANEOUS at 09:21

## 2020-09-11 RX ADMIN — BUDESONIDE AND FORMOTEROL FUMARATE DIHYDRATE 2 PUFF: 160; 4.5 AEROSOL RESPIRATORY (INHALATION) at 20:00

## 2020-09-11 RX ADMIN — FINASTERIDE 5 MG: 5 TABLET, FILM COATED ORAL at 09:21

## 2020-09-11 RX ADMIN — LISINOPRIL 10 MG: 10 TABLET ORAL at 09:21

## 2020-09-11 RX ADMIN — ALLOPURINOL 100 MG: 100 TABLET ORAL at 09:21

## 2020-09-11 RX ADMIN — Medication 100 MG: at 09:21

## 2020-09-11 RX ADMIN — BUDESONIDE AND FORMOTEROL FUMARATE DIHYDRATE 2 PUFF: 160; 4.5 AEROSOL RESPIRATORY (INHALATION) at 08:29

## 2020-09-11 RX ADMIN — PANTOPRAZOLE SODIUM 40 MG: 40 TABLET, DELAYED RELEASE ORAL at 06:15

## 2020-09-11 RX ADMIN — Medication 15 G: at 20:07

## 2020-09-11 RX ADMIN — SODIUM CHLORIDE, PRESERVATIVE FREE 10 ML: 5 INJECTION INTRAVENOUS at 20:08

## 2020-09-11 RX ADMIN — TAMSULOSIN HYDROCHLORIDE 0.4 MG: 0.4 CAPSULE ORAL at 20:07

## 2020-09-11 RX ADMIN — SODIUM CHLORIDE, PRESERVATIVE FREE 10 ML: 5 INJECTION INTRAVENOUS at 09:21

## 2020-09-11 NOTE — PLAN OF CARE
Problem: Falls - Risk of:  Goal: Will remain free from falls  Description: Will remain free from falls  9/11/2020 1041 by Sunny Dickerson LPN  Outcome: Ongoing  9/11/2020 0124 by Al Bedolla LPN  Outcome: Ongoing  Goal: Absence of physical injury  Description: Absence of physical injury  9/11/2020 1041 by Sunny Dickerson LPN  Outcome: Ongoing  9/11/2020 0124 by Al Bedolla LPN  Outcome: Ongoing

## 2020-09-11 NOTE — PROGRESS NOTES
conjunctivitis. HENT Mucous membranes are moist. Oral pharynx without exudates, no evidence of thrush. NECK Supple, no apparent thyromegaly or masses. RESP Clear to auscultation, no wheezes, rales or rhonchi. Symmetric chest movement while on room air. CARDIO/VASC S1/S2 auscultated. Regular rate without appreciable murmurs, rubs, or gallops. No JVD or carotid bruits. Peripheral pulses equal bilaterally and palpable. No peripheral edema. GI Abdomen is soft without significant tenderness, masses, or guarding. Bowel sounds are normoactive. Rectal exam deferred.  No costovertebral angle tenderness. Normal appearing external genitalia. Castillo catheter is not present. HEME/LYMPH No palpable cervical lymphadenopathy and no hepatosplenomegaly. No petechiae or ecchymoses. MSK No gross joint deformities. SKIN Normal coloration, warm, dry. NEURO Cranial nerves appear grossly intact, normal speech, no lateralizing weakness. PSYCH Awake, alert, oriented x 4. Affect appropriate.     Medications:   Medications:    tamsulosin  0.4 mg Oral Daily    allopurinol  100 mg Oral Daily    amLODIPine  5 mg Oral Daily    finasteride  5 mg Oral Daily    lisinopril  10 mg Oral Daily    sodium chloride flush  10 mL Intravenous 2 times per day    enoxaparin  30 mg Subcutaneous Daily    sodium chloride flush  10 mL Intravenous 2 times per day    thiamine  100 mg Oral Daily    amLODIPine  10 mg Oral Daily    budesonide-formoterol  2 puff Inhalation BID    pantoprazole  40 mg Oral QAM AC      Infusions:    sodium chloride 75 mL/hr at 09/10/20 1521     PRN Meds: sodium chloride flush, 10 mL, PRN  acetaminophen, 650 mg, Q6H PRN    Or  acetaminophen, 650 mg, Q6H PRN  polyethylene glycol, 17 g, Daily PRN  promethazine, 12.5 mg, Q6H PRN    Or  ondansetron, 4 mg, Q6H PRN  sodium chloride flush, 10 mL, PRN  LORazepam, 1 mg, Q1H PRN    Or  LORazepam, 1 mg, Q1H PRN    Or  LORazepam, 2 mg, Q1H PRN    Or  LORazepam, 2 mg, Q1H PRN Or  LORazepam, 3 mg, Q1H PRN    Or  LORazepam, 3 mg, Q1H PRN    Or  LORazepam, 4 mg, Q1H PRN    Or  LORazepam, 4 mg, Q1H PRN          Electronically signed by Nida Swain MD on 9/11/2020 at 8:19 AM

## 2020-09-11 NOTE — CARE COORDINATION
Attempted to call patient for discharge planning with no answer. CM will re-attempt.       4:53 PM  Chart reviewed, CM called and spoke with patient on the hospital phone for dc planning. Introduced self and reason for call. Pt was admitted for hyponatremia. States he lives at home with his wife/Martine and will return home with no needs. Explained he follows with PCP, has insurance with affordable RX co-pays and reliable transportation per himself or his wife. Discussed HHC and pt declined [facility] need nor any other discharge needs. Also attempted to discuss ETOH resources and he declined need. CM remains available if needs arise.

## 2020-09-11 NOTE — CONSULTS
oriented x3, in no acute respiratory  distress. VITAL SIGNS:  His blood pressure is 116/57 mmHg, pulse of 84 per minute,  and respiratory rate of 14 per minute. He is afebrile. His saturation  is 95% on room air. HEENT:  Essentially unremarkable. NECK:  There is no JVD. No lymphadenopathy. The neck is supple. LUNGS:  Revealed diminished breath sounds. There are no rhonchi. HEART:  Showed normal S1 and S2. There was no S3 or S4 noted. ABDOMEN:  Benign. There is no evidence of organomegaly. The bowel  sounds are present. NEUROLOGIC:  Reveals that he is awake and responsive, answering  questions appropriately, and moving his extremities. IMAGING:  His CAT of the chest showed 2.1 cm x 1.2 cm nodule in the in  the superior segment of the right lower lobe. LABORATORY DATA:  His electrolytes showed a sodium of 116, potassium  4.5, chloride 81, carbon dioxide 23, BUN 9, creatinine 0.8. His proBNP  was 332. His magnesium was 1.6. His CBC showed a white count of 6.8,  hemoglobin 12.7, hematocrit of 35.9. He had ApneaLink, which showed  suggestion of moderate obstructive sleep apnea with AHI of 23. IMPRESSION:  1. Right lower lobe lung nodule, rule out bronchogenic carcinoma. 2.  Hyponatremia, possibly related to lung cancer, particularly small  cell lung cancer can do that. 3.  COPD.  4.  Obstructive sleep apnea. PLAN:  1. We will check CEA level. 2.  Nephrology is managing hyponatremia. 3.  The patient will need outpatient PET scan. 4.  Continue present bronchodilator therapy. 5.  If the PET scan is positive, we will proceed with transthoracic  needle aspiration. 6.  The patient will need outpatient sleep study and CPAP at home. 7.  We will order auto CPAP while in the hospital at the setting of  5/20.  8. As per orders.         Amanda Obando MD    D: 09/11/2020 12:41:40       T: 09/11/2020 14:02:28     [email or web address]  Job#: [id number]     Doc#: [id number]    CC:

## 2020-09-11 NOTE — CONSULTS
[address], [address] [facility]                                  CONSULTATION    PATIENT NAME: Frank Payan                   :        1940  MED REC NO:   [id number]                          ROOM:       8722  ACCOUNT NO:   [de-identified]                           ADMIT DATE: 09/10/2020  PROVIDER:     Marco Hemphill MD    CONSULT DATE:  09/10/2020    CONSULT REQUESTED BY:  Camryn Shearer MD    REASON FOR CONSULT:  Acute-on-chronic hyponatremia likely with symptoms. BRIEF HISTORY:  The patient is an 58-year-old male who was sent by his  primary care to the emergency room with \"abnormal lab. \"  Apparently, the  patient has not been feeling very well for the last three or four days. He is usually very active, walk around, does things, but according to  him and his wife, he was sleeping all the time. He is fatigued, tired,  also not eating very well. He also had a labile blood pressure up and  down. With his symptoms, he went to see his primary care and a routine  lab was done to see if something biochemically going on, then he got the  call this morning that his sodium is low. Looks like, in addition to  that he was seen by an ENT. I am not sure how long ago and they could  not find any clear etiology of his fatigue, tiredness, or other auditory  symptoms that he had which is actually mild. In any event, in the emergency room, the patient was hemodynamically  stable. Blood pressure is slightly up, but this is an automatic blood  pressure. I need to recheck it manually. He underwent several  diagnostic tests, which include biochemical and imaging. Imaging study  which includes PA and lateral chest x-ray showed questionable nodule in  the right lung. I did see a small nodule, but I am not sure and I think  he is going to get a CT to confirm it.   Biochemical testing indeed  showed sodium of 116, accompanied chloride of 81, BUN and creatinine 9  and 0.8 respectively. His proBNP level is 332. Magnesium is rather low  about 1.6. CBC showed acceptable cell count, three lines, which include  platelet, white cell, and hemoglobin, which is 12.7. I did see him in the emergency room. His wife is sitting next to him  and he is alert and awake and oriented. There are no apparent  confusions other than the fatigue, tiredness, sleepiness, and some  anorexia, does not really have any other symptoms. It looks like he  recently started lisinopril and hydrochlorothiazide. He could not  pinpoint how long ago. In addition to that, when I reviewed his prior data, looks like sodium  has been actually running low even back on 09/10/2012. Ironically, it  was almost eight years ago, was 131 and then he also was hyponatremic in  05/2020. The avelina sodium in the past was 128 that was on 08/10/2020  and 09/09/2020 was 120 when it was done as an outpatient; of course,  today is 116. He consumes pretty good amount of alcohol, about a couple  of glasses of wine. He drinks daily, but looks like he has pretty good  protein intake. He eats chicken, pork along with other protein products  and wife was sitting next to him and verified all the data. His kidney  function is relatively preserved of course by the creatinine criteria. Urinalysis really does not show any active sediment or albuminuria. PAST MEDICAL HISTORY:  1. Hypertension. Of course, lisinopril and HCTZ apparently was  initiated not too long ago. 2.  Rheumatoid arthritis. He does see Dr. Terra Redd and he has been on  Plaquenil and Celebrex for six or seven years or so. Did not have any  other disease modifying agent. 3.  Two episodes of venous thromboembolism. First episode was DVT from  orthopedic surgery followed by PE. Apparently, had another DVT. He was  on Coumadin for a while, but discontinued about a couple years ago.   Did  not seem to have at least symptomatically any recurrence of venous  thromboembolism. 4.  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 He  was in [facility] and actually had an exploratory abdominal surgery. He  was told he had some \"colon damage. \"  I am assuming it is a Salmonella  typhi back in 1994 of course. 5.  Gastroesophageal reflux disorder. He has been on proton pump  inhibitor for a long time. 6.  Although he said he never had any gout, but he is on allopurinol 100  mg daily. Maybe he had it a long time ago. 7.  Probable COPD that is a clinical diagnosis. I do not think he ever  had any PFTs and Advair. 8.  BPH. He takes finasteride 5 mg daily, has some symptoms. I do not  see any tamsulosin in his list.    HOME MEDICATIONS:  He is on Prevacid, hydroxychloroquine,  lisinopril/hydrochlorothiazide, allopurinol 100 mg daily, Celebrex 400  mg daily, Advair, finasteride, as well as amlodipine, prednisone 5 mg,  and Timolol. HABITS:  He quit smoking 40 years ago. Has roughly 20 pack-year  history. He drinks daily. He had been doing it for a long time. He  drinks a couple of wine and a couple of beer a day. No illicit drug  abuse. PAST SURGICAL HISTORY:  Only significant for exploratory abdominal  surgery back in 1994 thought to be from Salmonella typhi complication. FAMILY MEDICAL HISTORY:  Only significant for mother having dementia. No other significant disease. Nobody had low sodium or any other  electrolyte imbalance that he could remember. SOCIAL HISTORY:  The patient is originally from [place], [place],  but he has been residing with his wife here in [facility] for the last  23 years or so. They have been  for 38 years. He has two  biological children. 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 He is active, all things  considered for the age of 79 now.   He is retired of course and enjoying  his life with his wife here in town. His wife actually originated from  [facility], [facility], but living here, as I mentioned, for the  last 23 years or so. They seem to be a very happy couple. REVIEW OF SYSTEMS:  As I mentioned, all these symptoms are for the last  three days, mainly he has been sleeping a lot, fatigue, tiredness, some  poor oral intake, which is quite unusual for him. Also seemed to have a  labile blood pressure either low or high. No fever, chills, or rigor. No weight loss. No other constitutional symptoms particularly given his  questionable lung nodule. Rest of the review of systems is negative  other than previous paragraph. PHYSICAL EXAMINATION:  VITAL SIGNS:  At the time of examination reveal, temperature 97.5, blood  pressure 160s/80s, again this is an automatic blood pressure,  respiratory rate is 12, saturating about 95%, and pulse is 76. GENERAL:  The patient is without any acute distress. His head of the  bed is elevated about 15 to 20 degrees. He is alert, awake, and  oriented. He is a very pleasant araseli. I actually enjoyed talking with  him and his wife who is actually kneading in his bedside. HEENT:  Head and Neck:  Normocephalic, atraumatic. Eyes:  I did not see  any conjunctival pallor. Ear, Mouth, and Throat:  Normal oral mucosa. CARDIOVASCULAR:  Seems regular rate and rhythm. RESPIRATORY:  Clear to auscultation. ABDOMEN:  Soft. EXTREMITIES:  No edema. He does have several bruises by the way, which  he states he had it for a long time. LABORATORY VALUES AND ANCILLARY SERVICES:  As I mentioned, urine is  rather bland and sodium was 116, magnesium was 1.6. IMPRESSION:  An 65-year-old male with acute-on-chronic hyponatremia. 1.  Acute-on-chronic hyponatremia, symptomatic at least for the last  four days.   I can make a good case that his sleepiness, fatigue,  tiredness, and lightheadedness, all could have been from hyponatremia  that was not preceded by nausea, vomiting, or diarrhea. Potential  etiology would be hydrochlorothiazide since he started not too long ago  along with NSAID which can cause hyponatremia too, but given his  pulmonary nodule, if indeed he had some pulmonary mass, then ectopic ADH  is a possibility. Of course can have other etiology too, but looks like  he has a very good protein intake, so _____ is less likely, but his  chronicity raises some question from chronic process. 2.  Underlying hypertension, rheumatoid arthritis, gastroesophageal  reflux disorder, venous thromboembolism, COPD, etc.  3.  Hypomagnesemia could be from alcohol or from hydrochlorothiazide as  it can cause hypomagnesemia of course. PLAN:  His urine is bland, so we will add some urinary indices and also  add arginine vasopressin level for tomorrow morning. We will review the  CT. Probably would be the good thing to do given his history of smoking  and age, etc.  We will get BMP every six hours or so since it is a  chronic process looks like. The goal is not to raise sodium more than 6  to 8 mEq per liter for 24 hours. Otherwise, follow clinically.         Solo Oneill MD    D: 09/10/2020 16:56:46       T: 09/10/2020 20:43:12     [email or web address]  Job#: [id number]     Doc#: [id number]    CC:

## 2020-09-11 NOTE — PROGRESS NOTES
Dr Constantino Santiago notified of Alert Value :Na 118 at this time. No change from last draw. No new orders.

## 2020-09-11 NOTE — PROGRESS NOTES
Nephrology Progress Note  9/11/2020 6:09 PM        Subjective:   Admit Date: 9/10/2020  PCP: Alexa Rodrigez MD    Interval History: pt seen in early  am     Diet: good    ROS:  No sob - no confusion     Data:     Current meds:    urea  15 g Oral BID    tamsulosin  0.4 mg Oral Daily    allopurinol  100 mg Oral Daily    finasteride  5 mg Oral Daily    lisinopril  10 mg Oral Daily    sodium chloride flush  10 mL Intravenous 2 times per day    enoxaparin  30 mg Subcutaneous Daily    sodium chloride flush  10 mL Intravenous 2 times per day    thiamine  100 mg Oral Daily    budesonide-formoterol  2 puff Inhalation BID    pantoprazole  40 mg Oral QAM AC           I/O last 3 completed shifts: In: 10 [I.V.:10]  Out: -     CBC:   Recent Labs     09/09/20  1553 09/10/20  1332   WBC 7.4 6.8   HGB 13.1* 12.7*    182          Recent Labs     09/11/20  0339 09/11/20  1001 09/11/20  1536   * 118* 120*   K 4.4 3.9 4.6   CL 84* 85* 86*   CO2 21 21 21   BUN 10 10 10   CREATININE 0.9 0.9 0.9   GLUCOSE 88 165* 95       Lab Results   Component Value Date    CALCIUM 8.5 09/11/2020       Objective:     Vitals: BP (!) 145/80   Pulse 73   Temp 98.5 °F (36.9 °C) (Oral)   Resp 18   Ht 6' (1.829 m)   Wt 181 lb 4.8 oz (82.2 kg)   SpO2 95%   BMI 24.59 kg/m²     General appearance:  No distress - no confusion  HEENT:  No conj pallor  Neck:  supple  Lungs:  CTA  Heart:  Seems RRR  Abdomen: soft  Extremities:  No edema       Problem List :         Impression :     1. Ac on ch hyponatremia- no overt sx but may had some sx at home - urinary indices is not much helpful as all done  after NS- likely hypovolemia is the drinking force-  Na loss from HCTZ/ but  with lung nodule ectopic ADH is possible -  120 now - in comfortable zone   2. Lung na - will need w/U  3. Unde;lying RA/HTN /BPH etc     Recommendation/Plan  :     1. Stop NS  2. Fluid restriction 1  l/d  3. Po urea  4. await arginine level  5. Labs in am   6.  No need for Q 6 H   7.  His na 120 and no sx - so we can take a deep breath- also it is chronic issue       Michael Nicolas MD

## 2020-09-12 ENCOUNTER — APPOINTMENT (OUTPATIENT)
Dept: GENERAL RADIOLOGY | Age: 80
DRG: 644 | End: 2020-09-12
Payer: MEDICARE

## 2020-09-12 ENCOUNTER — APPOINTMENT (OUTPATIENT)
Dept: CT IMAGING | Age: 80
DRG: 644 | End: 2020-09-12
Payer: MEDICARE

## 2020-09-12 ENCOUNTER — HOSPITAL ENCOUNTER (INPATIENT)
Age: 80
LOS: 2 days | Discharge: HOME | DRG: 644 | End: 2020-09-14
Attending: STUDENT IN AN ORGANIZED HEALTH CARE EDUCATION/TRAINING PROGRAM | Admitting: STUDENT IN AN ORGANIZED HEALTH CARE EDUCATION/TRAINING PROGRAM
Payer: MEDICARE

## 2020-09-12 VITALS
RESPIRATION RATE: 18 BRPM | WEIGHT: 184.7 LBS | TEMPERATURE: 98.4 F | BODY MASS INDEX: 25.02 KG/M2 | OXYGEN SATURATION: 97 % | HEIGHT: 72 IN | DIASTOLIC BLOOD PRESSURE: 80 MMHG | SYSTOLIC BLOOD PRESSURE: 173 MMHG | HEART RATE: 82 BPM

## 2020-09-12 LAB
ALBUMIN SERPL-MCNC: 3.4 GM/DL (ref 3.4–5)
ALBUMIN SERPL-MCNC: 3.6 GM/DL (ref 3.4–5)
ALCOHOL SCREEN SERUM: <0.01 %WT/VOL
ALP BLD-CCNC: 44 IU/L (ref 40–128)
ALP BLD-CCNC: 49 IU/L (ref 40–129)
ALT SERPL-CCNC: 13 U/L (ref 10–40)
ALT SERPL-CCNC: 15 U/L (ref 10–40)
ANION GAP SERPL CALCULATED.3IONS-SCNC: 12 MMOL/L (ref 4–16)
ANION GAP SERPL CALCULATED.3IONS-SCNC: 15 MMOL/L (ref 4–16)
AST SERPL-CCNC: 19 IU/L (ref 15–37)
AST SERPL-CCNC: 20 IU/L (ref 15–37)
BACTERIA: ABNORMAL /HPF
BASOPHILS ABSOLUTE: 0 K/CU MM
BASOPHILS RELATIVE PERCENT: 0.5 % (ref 0–1)
BILIRUB SERPL-MCNC: 0.4 MG/DL (ref 0–1)
BILIRUB SERPL-MCNC: 0.6 MG/DL (ref 0–1)
BILIRUBIN URINE: NEGATIVE MG/DL
BLOOD, URINE: ABNORMAL
BUN BLDV-MCNC: 21 MG/DL (ref 6–23)
BUN BLDV-MCNC: 22 MG/DL (ref 6–23)
CALCIUM SERPL-MCNC: 8.4 MG/DL (ref 8.3–10.6)
CALCIUM SERPL-MCNC: 9.1 MG/DL (ref 8.3–10.6)
CEA: 3.1 NG/ML
CHLORIDE BLD-SCNC: 89 MMOL/L (ref 99–110)
CHLORIDE BLD-SCNC: 91 MMOL/L (ref 99–110)
CLARITY: CLEAR
CO2: 19 MMOL/L (ref 21–32)
CO2: 22 MMOL/L (ref 21–32)
COLOR: ABNORMAL
CREAT SERPL-MCNC: 0.9 MG/DL (ref 0.9–1.3)
CREAT SERPL-MCNC: 1 MG/DL (ref 0.9–1.3)
DIFFERENTIAL TYPE: ABNORMAL
EOSINOPHILS ABSOLUTE: 0.1 K/CU MM
EOSINOPHILS RELATIVE PERCENT: 2.2 % (ref 0–3)
GFR AFRICAN AMERICAN: >60 ML/MIN/1.73M2
GFR AFRICAN AMERICAN: >60 ML/MIN/1.73M2
GFR NON-AFRICAN AMERICAN: >60 ML/MIN/1.73M2
GFR NON-AFRICAN AMERICAN: >60 ML/MIN/1.73M2
GLUCOSE BLD-MCNC: 116 MG/DL (ref 70–99)
GLUCOSE BLD-MCNC: 131 MG/DL (ref 70–99)
GLUCOSE, URINE: NEGATIVE MG/DL
HCT VFR BLD CALC: 34.9 % (ref 42–52)
HEMOGLOBIN: 11.8 GM/DL (ref 13.5–18)
IMMATURE NEUTROPHIL %: 0.2 % (ref 0–0.43)
KETONES, URINE: NEGATIVE MG/DL
LACTATE: 1.3 MMOL/L (ref 0.4–2)
LEUKOCYTE ESTERASE, URINE: NEGATIVE
LIPASE: 29 IU/L (ref 13–60)
LYMPHOCYTES ABSOLUTE: 0.8 K/CU MM
LYMPHOCYTES RELATIVE PERCENT: 14.6 % (ref 24–44)
MAGNESIUM: 1.7 MG/DL (ref 1.8–2.4)
MCH RBC QN AUTO: 33.4 PG (ref 27–31)
MCHC RBC AUTO-ENTMCNC: 33.8 % (ref 32–36)
MCV RBC AUTO: 98.9 FL (ref 78–100)
MONOCYTES ABSOLUTE: 0.6 K/CU MM
MONOCYTES RELATIVE PERCENT: 10.4 % (ref 0–4)
NITRITE URINE, QUANTITATIVE: NEGATIVE
NUCLEATED RBC %: 0 %
PDW BLD-RTO: 12.9 % (ref 11.7–14.9)
PH VENOUS: 7.43 (ref 7.32–7.42)
PH, URINE: 7 (ref 5–8)
PLATELET # BLD: 200 K/CU MM (ref 140–440)
PMV BLD AUTO: 9.8 FL (ref 7.5–11.1)
POTASSIUM SERPL-SCNC: 3.9 MMOL/L (ref 3.5–5.1)
POTASSIUM SERPL-SCNC: 4 MMOL/L (ref 3.5–5.1)
PRO-BNP: 247.4 PG/ML
PRO-BNP: 247.8 PG/ML
PROCALCITONIN: 0.14
PROTEIN UA: NEGATIVE MG/DL
RBC # BLD: 3.53 M/CU MM (ref 4.6–6.2)
RBC URINE: 1 /HPF (ref 0–3)
RENAL EPITHELIAL, UA: <1 /HPF
SEGMENTED NEUTROPHILS ABSOLUTE COUNT: 3.9 K/CU MM
SEGMENTED NEUTROPHILS RELATIVE PERCENT: 72.1 % (ref 36–66)
SODIUM BLD-SCNC: 123 MMOL/L (ref 135–145)
SODIUM BLD-SCNC: 125 MMOL/L (ref 135–145)
SPECIFIC GRAVITY UA: 1 (ref 1–1.03)
SQUAMOUS EPITHELIAL: 1 /HPF
TOTAL IMMATURE NEUTOROPHIL: 0.01 K/CU MM
TOTAL NUCLEATED RBC: 0 K/CU MM
TOTAL PROTEIN: 6.6 GM/DL (ref 6.4–8.2)
TOTAL PROTEIN: 7.1 GM/DL (ref 6.4–8.2)
TRICHOMONAS: ABNORMAL /HPF
TROPONIN T: <0.01 NG/ML
UROBILINOGEN, URINE: NORMAL MG/DL (ref 0.2–1)
WBC # BLD: 5.5 K/CU MM (ref 4–10.5)
WBC UA: 1 /HPF (ref 0–2)

## 2020-09-12 PROCEDURE — 74018 RADEX ABDOMEN 1 VIEW: CPT

## 2020-09-12 PROCEDURE — 94761 N-INVAS EAR/PLS OXIMETRY MLT: CPT

## 2020-09-12 PROCEDURE — 85025 COMPLETE CBC W/AUTO DIFF WBC: CPT

## 2020-09-12 PROCEDURE — 84484 ASSAY OF TROPONIN QUANT: CPT

## 2020-09-12 PROCEDURE — 6370000000 HC RX 637 (ALT 250 FOR IP): Performed by: INTERNAL MEDICINE

## 2020-09-12 PROCEDURE — 84300 ASSAY OF URINE SODIUM: CPT

## 2020-09-12 PROCEDURE — 83690 ASSAY OF LIPASE: CPT

## 2020-09-12 PROCEDURE — 83935 ASSAY OF URINE OSMOLALITY: CPT

## 2020-09-12 PROCEDURE — 83880 ASSAY OF NATRIURETIC PEPTIDE: CPT

## 2020-09-12 PROCEDURE — 84145 PROCALCITONIN (PCT): CPT

## 2020-09-12 PROCEDURE — 94640 AIRWAY INHALATION TREATMENT: CPT

## 2020-09-12 PROCEDURE — 93005 ELECTROCARDIOGRAM TRACING: CPT | Performed by: EMERGENCY MEDICINE

## 2020-09-12 PROCEDURE — 94664 DEMO&/EVAL PT USE INHALER: CPT

## 2020-09-12 PROCEDURE — 87086 URINE CULTURE/COLONY COUNT: CPT

## 2020-09-12 PROCEDURE — 6360000002 HC RX W HCPCS: Performed by: INTERNAL MEDICINE

## 2020-09-12 PROCEDURE — G0480 DRUG TEST DEF 1-7 CLASSES: HCPCS

## 2020-09-12 PROCEDURE — 99291 CRITICAL CARE FIRST HOUR: CPT

## 2020-09-12 PROCEDURE — 81001 URINALYSIS AUTO W/SCOPE: CPT

## 2020-09-12 PROCEDURE — 83605 ASSAY OF LACTIC ACID: CPT

## 2020-09-12 PROCEDURE — 1200000000 HC SEMI PRIVATE

## 2020-09-12 PROCEDURE — 82378 CARCINOEMBRYONIC ANTIGEN: CPT

## 2020-09-12 PROCEDURE — 36415 COLL VENOUS BLD VENIPUNCTURE: CPT

## 2020-09-12 PROCEDURE — 80053 COMPREHEN METABOLIC PANEL: CPT

## 2020-09-12 PROCEDURE — 2580000003 HC RX 258: Performed by: PHYSICIAN ASSISTANT

## 2020-09-12 PROCEDURE — 82800 BLOOD PH: CPT

## 2020-09-12 PROCEDURE — 83735 ASSAY OF MAGNESIUM: CPT

## 2020-09-12 PROCEDURE — 2580000003 HC RX 258: Performed by: INTERNAL MEDICINE

## 2020-09-12 PROCEDURE — 71046 X-RAY EXAM CHEST 2 VIEWS: CPT

## 2020-09-12 PROCEDURE — 70450 CT HEAD/BRAIN W/O DYE: CPT

## 2020-09-12 RX ORDER — MAGNESIUM SULFATE IN WATER 40 MG/ML
2 INJECTION, SOLUTION INTRAVENOUS ONCE
Status: COMPLETED | OUTPATIENT
Start: 2020-09-12 | End: 2020-09-12

## 2020-09-12 RX ORDER — LANOLIN ALCOHOL/MO/W.PET/CERES
100 CREAM (GRAM) TOPICAL DAILY
Qty: 30 TABLET | Refills: 3 | Status: SHIPPED | OUTPATIENT
Start: 2020-09-12

## 2020-09-12 RX ORDER — SODIUM CHLORIDE 9 MG/ML
INJECTION, SOLUTION INTRAVENOUS CONTINUOUS
Status: DISCONTINUED | OUTPATIENT
Start: 2020-09-12 | End: 2020-09-13

## 2020-09-12 RX ORDER — LISINOPRIL 10 MG/1
10 TABLET ORAL DAILY
Qty: 90 TABLET | Refills: 3 | Status: ON HOLD | OUTPATIENT
Start: 2020-09-12 | End: 2020-09-14 | Stop reason: HOSPADM

## 2020-09-12 RX ORDER — 0.9 % SODIUM CHLORIDE 0.9 %
1000 INTRAVENOUS SOLUTION INTRAVENOUS ONCE
Status: DISCONTINUED | OUTPATIENT
Start: 2020-09-12 | End: 2020-09-12

## 2020-09-12 RX ORDER — TOLVAPTAN 15 MG/1
15 TABLET ORAL ONCE
Status: COMPLETED | OUTPATIENT
Start: 2020-09-12 | End: 2020-09-12

## 2020-09-12 RX ADMIN — LISINOPRIL 10 MG: 10 TABLET ORAL at 08:44

## 2020-09-12 RX ADMIN — MAGNESIUM SULFATE HEPTAHYDRATE 2 G: 40 INJECTION, SOLUTION INTRAVENOUS at 08:52

## 2020-09-12 RX ADMIN — SODIUM CHLORIDE: 9 INJECTION, SOLUTION INTRAVENOUS at 22:00

## 2020-09-12 RX ADMIN — FINASTERIDE 5 MG: 5 TABLET, FILM COATED ORAL at 08:44

## 2020-09-12 RX ADMIN — BUDESONIDE AND FORMOTEROL FUMARATE DIHYDRATE 2 PUFF: 160; 4.5 AEROSOL RESPIRATORY (INHALATION) at 08:41

## 2020-09-12 RX ADMIN — Medication 100 MG: at 08:44

## 2020-09-12 RX ADMIN — PANTOPRAZOLE SODIUM 40 MG: 40 TABLET, DELAYED RELEASE ORAL at 06:36

## 2020-09-12 RX ADMIN — ALLOPURINOL 100 MG: 100 TABLET ORAL at 08:43

## 2020-09-12 RX ADMIN — SODIUM CHLORIDE, PRESERVATIVE FREE 10 ML: 5 INJECTION INTRAVENOUS at 08:45

## 2020-09-12 RX ADMIN — Medication 15 G: at 08:43

## 2020-09-12 RX ADMIN — ENOXAPARIN SODIUM 30 MG: 30 INJECTION SUBCUTANEOUS at 08:45

## 2020-09-12 RX ADMIN — TOLVAPTAN 15 MG: 15 TABLET ORAL at 08:43

## 2020-09-12 NOTE — PROGRESS NOTES
Nephrology Progress Note  9/12/2020 7:37 AM        Subjective:   Admit Date: 9/10/2020  PCP: Meliza Martin MD    Interval History: no major event     Diet: better    ROS:  No GI sx, no confusion    Data:     Current meds:    magnesium sulfate  2 g Intravenous Once    tolvaptan  15 mg Oral Once    urea  15 g Oral BID    tamsulosin  0.4 mg Oral Daily    allopurinol  100 mg Oral Daily    finasteride  5 mg Oral Daily    lisinopril  10 mg Oral Daily    sodium chloride flush  10 mL Intravenous 2 times per day    enoxaparin  30 mg Subcutaneous Daily    sodium chloride flush  10 mL Intravenous 2 times per day    thiamine  100 mg Oral Daily    budesonide-formoterol  2 puff Inhalation BID    pantoprazole  40 mg Oral QAM AC           I/O last 3 completed shifts: In: 10 [I.V.:10]  Out: -     CBC:   Recent Labs     09/09/20  1553 09/10/20  1332   WBC 7.4 6.8   HGB 13.1* 12.7*    182          Recent Labs     09/11/20  1001 09/11/20  1536 09/12/20  0306   * 120* 123*   K 3.9 4.6 3.9   CL 85* 86* 89*   CO2 21 21 22   BUN 10 10 21   CREATININE 0.9 0.9 0.9   GLUCOSE 165* 95 116*       Lab Results   Component Value Date    CALCIUM 8.4 09/12/2020       Objective:     Vitals: /60   Pulse 82   Temp 98.4 °F (36.9 °C) (Oral)   Resp 15   Ht 6' (1.829 m)   Wt 184 lb 11.2 oz (83.8 kg)   SpO2 95%   BMI 25.05 kg/m²     General appearance:  No distress  HEENT:  No pallor  Neck:  supple  Lungs:  CTA  Heart:  Seems RRR   Abdomen: soft  Extremities:  No edema       Problem List :         Impression :     1. 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 Lung nodule - it could be malignant timmy if it is secreting ADH ( timmy bronchial ca )   3. HTN/ RA/ etc   4.  Low mg may be etoh related and sec to PPI ( GI  Loss)  replete     Recommendation/Plan  :     1. I dose of tolvaptan 15 mg 2. Fluid restriction to 1.4 L/d   3. No HCTZ  4. No NSAID- Celebrex for now   5. CMP , mg early next week- Mon/ Tuesday  6. F/U with me next Friday   7.  Please have pt call - [phone] Monday after 9 am to make an appointment with me        Regan Jefferson MD

## 2020-09-12 NOTE — ED PROVIDER NOTES
As physician-in-triage, I performed a medical screening history and physical exam on this patient. HISTORY OF PRESENT ILLNESS  Miguel Whiteside is a 79 y.o. male  emergency department with chief complaint of dizziness. Patient was recently discharged from the hospital for hyponatremia. Wife is concerned that the patient seems to be confused, similarly to when he was previously admitted. Lab work including CBC, CMP, troponin, BNP, blood gas, urinalysis were ordered. Chest x-ray and EKG were also ordered. Patient does report considerable constipation over the last 3 days. KUB was ordered. Normal saline bolus was also ordered. Will defer use of hypertonic saline to emergency team at [facility] after results. Vital signs are otherwise stable. PHYSICAL EXAM  Ht 6' 1\" (1.854 m)   Wt 189 lb (85.7 kg)   BMI 24.94 kg/m²     On exam, the patient appears in no acute distress. Speech is clear. Breathing is unlabored. Moves all extremities    Comment: Please note this report has been produced using speech recognition software and may contain errors related to that system including errors in grammar, punctuation, and spelling, as well as words and phrases that may be inappropriate. If there are any questions or concerns please feel free to contact the dictating provider for clarification.        Sabrina Ortiz DO  09/12/20 1943

## 2020-09-12 NOTE — DISCHARGE SUMMARY
Medications:      Eliane Gorman   Home Medication Instructions [place]:[id number]    Printed on:09/12/20 4826   Medication Information                      ADVAIR DISKUS 250-50 MCG/DOSE AEPB  Inhale 1 puff into the lungs 2 times daily             alfuzosin (UROXATRAL) 10 MG extended release tablet  Take 10 mg by mouth daily             allopurinol (ZYLOPRIM) 100 MG tablet  Take 1 tablet by mouth daily             amLODIPine (NORVASC) 5 MG tablet  Take 1 tablet by mouth daily             finasteride (PROSCAR) 5 MG tablet  Take 1 tablet by mouth daily             guaiFENesin (MUCINEX) 600 MG extended release tablet  Take 2 tablets by mouth every 12 hours for 14 days Drink plenty of water. lansoprazole (PREVACID) 30 MG delayed release capsule  Take 30 mg by mouth daily             lisinopril (PRINIVIL;ZESTRIL) 10 MG tablet  Take 1 tablet by mouth daily             predniSONE (DELTASONE) 5 MG tablet  Take 5 mg by mouth daily              vitamin B-1 100 MG tablet  Take 1 tablet by mouth daily             zoster recombinant adjuvanted vaccine (SHINGRIX) 50 MCG/0.5ML SUSR injection  50 MCG IM then repeat 2-6 months. Objective Findings at Discharge:   /60   Pulse 82   Temp 98.4 °F (36.9 °C) (Oral)   Resp 15   Ht 6' (1.829 m)   Wt 184 lb 11.2 oz (83.8 kg)   SpO2 95%   BMI 25.05 kg/m²            PHYSICAL EXAM   GEN Awake male, sitting upright in bed in no apparent distress. Appears given age. EYES Pupils are equally round. No scleral erythema, discharge, or conjunctivitis. HENT Mucous membranes are moist. Oral pharynx without exudates, no evidence of thrush. NECK Supple, no apparent thyromegaly or masses. RESP Clear to auscultation, no wheezes, rales or rhonchi. Symmetric chest movement while on room air. CARDIO/VASC S1/S2 auscultated. Regular rate without appreciable murmurs, rubs, or gallops. No JVD or carotid bruits. Peripheral pulses equal bilaterally and palpable.  No peripheral edema. GI Abdomen is soft without significant tenderness, masses, or guarding. Bowel sounds are normoactive. Rectal exam deferred.  No costovertebral angle tenderness. Normal appearing external genitalia. Castillo catheter is not present. HEME/LYMPH No palpable cervical lymphadenopathy and no hepatosplenomegaly. No petechiae or ecchymoses. MSK No gross joint deformities. SKIN Normal coloration, warm, dry. NEURO Cranial nerves appear grossly intact, normal speech, no lateralizing weakness. PSYCH Awake, alert, oriented x 4. Affect appropriate. BMP/CBC  Recent Labs     09/09/20  1553 09/10/20  1332  09/11/20  1001 09/11/20  1536 09/12/20  0306   * 116*   < > 118* 120* 123*   K 4.8 4.5   < > 3.9 4.6 3.9   CL 86* 81*   < > 85* 86* 89*   CO2 23 23   < > 21 21 22   BUN 13 9   < > 10 10 21   CREATININE 0.8 0.8*   < > 0.9 0.9 0.9   WBC 7.4 6.8  --   --   --   --    HCT 37.9* 35.9*  --   --   --   --     182  --   --   --   --     < > = values in this interval not displayed.        IMAGING:  As above    Discharge Time of 35 minutes    Electronically signed by Itzel Avelar MD on 9/12/2020 at 8:08 AM

## 2020-09-12 NOTE — PROGRESS NOTES
Pulmonary and Critical Care  Progress Note    Subjective: The patient has improved  Shortness of breath none  Chest pain none  Addressing respiratory complaints Patient is negative for  hemoptysis and cyanosis  CONSTITUTIONAL:  negative for fevers and chills      Past Medical History:     has a past medical history of Acid reflux, Arthritis, Benign prostatic hyperplasia, BPH (benign prostatic hyperplasia), COPD (chronic obstructive pulmonary disease) ([facility] [address] Dizziness, DVT (deep venous thrombosis) ([facility] [address] Enlarged prostate, Essential hypertension, Glaucoma, Gout, [place] (hard of hearing), Hx of blood clots, Hypertension, Impaired glucose tolerance, Pneumonia, Slow urinary stream, Syncope, Teeth missing, and Wears glasses. has a past surgical history that includes eye surgery (Right, 1990's); eye surgery (Left, 2016); Dental surgery; Colonoscopy (2000's); Inguinal hernia repair (Right, 2000); Vasectomy (1984); Abdominal exploration surgery (1984); Inguinal hernia repair (Right, 02/28/2018); and Appendectomy. reports that he quit smoking about 36 years ago. His smoking use included cigarettes and pipe. He started smoking about 65 years ago. He has a 29.00 pack-year smoking history. He has never used smokeless tobacco. He reports current alcohol use of about 2.0 - 3.0 standard drinks of alcohol per week. He reports that he does not use drugs. Family history:  family history includes Dementia in his mother; Diabetes type 2  in his father.     No Known Allergies  Social History:    Reviewed; no changes    Objective:   PHYSICAL EXAM:        VITALS:  BP (!) 173/80   Pulse 82   Temp 98.4 °F (36.9 °C) (Oral)   Resp 18   Ht 6' (1.829 m)   Wt 184 lb 11.2 oz (83.8 kg)   SpO2 97%   BMI 25.05 kg/m²     24HR INTAKE/OUTPUT:  No intake or output data in the 24 hours ending 09/12/20 1058    CONSTITUTIONAL:  awake, alert, cooperative, no apparent distress, and appears stated age  LUNGS:  decreased breath sounds  CARDIOVASCULAR:  normal S1 and S2 and negative JVD  ABD:Abdomen soft, non-tender. BS normal. No masses,  No organomegaly  NEURO:Alert and oriented x3. Gait normal. Reflexes and motor strength normal and symmetric. Cranial nerves 2-12 and sensation grossly intact. DATA:    CBC:  Recent Labs     09/09/20  1553 09/10/20  1332   WBC 7.4 6.8   RBC 3.80* 3.80*   HGB 13.1* 12.7*   HCT 37.9* 35.9*    182   MCV 99.6 94.5   MCH 34.4* 33.4*   MCHC 34.6 35.4   RDW 13.7 12.6   SEGSPCT  --  70.9*      BMP:  Recent Labs     09/11/20  1001 09/11/20  1536 09/12/20  0306   * 120* 123*   K 3.9 4.6 3.9   CL 85* 86* 89*   CO2 21 21 22   BUN 10 10 21   CREATININE 0.9 0.9 0.9   CALCIUM 8.1* 8.5 8.4   GLUCOSE 165* 95 116*      ABG:  No results for input(s): PH, PO2ART, BGU5OBT, HCO3, BEART, O2SAT in the last 72 hours. Lab Results   Component Value Date    PROBNP 247.8 09/12/2020    PROBNP 332.3 (H) 09/10/2020    PROBNP 295 09/09/2020     No results found for: [address] [facility]    Radiology Review:  Pertinent images / reports were reviewed as a part of this visit. Assessment:     Patient Active Problem List   Diagnosis    Primary osteoarthritis of right wrist    Essential hypertension    Impaired glucose tolerance    COPD (chronic obstructive pulmonary disease) (HCC)    Benign prostatic hyperplasia    Post herpetic neuralgia    Hyponatremia       Plan:   1. Overall the patient has improved. 2. Sleep study as out pt. 3. PET scan as outpt.     Christopher Mccullough MD  9/12/2020  10:58 AM

## 2020-09-12 NOTE — ED TRIAGE NOTES
Pt arrives with wife more confused, fatigued, sleeping \"all the time\"; was just dc'ed today with dx of hyponatremia. Pt was treated and has not gotten better. Pt had to use his neighbors walker today and wife states usually he is not that weak. Pt complains of dizziness as well.

## 2020-09-13 PROBLEM — M19.90 ARTHRITIS: Status: ACTIVE | Noted: 2020-09-13

## 2020-09-13 LAB
ANION GAP SERPL CALCULATED.3IONS-SCNC: 12 MMOL/L (ref 4–16)
BUN BLDV-MCNC: 31 MG/DL (ref 6–23)
CALCIUM SERPL-MCNC: 9 MG/DL (ref 8.3–10.6)
CHLORIDE BLD-SCNC: 99 MMOL/L (ref 99–110)
CO2: 21 MMOL/L (ref 21–32)
CREAT SERPL-MCNC: 1.2 MG/DL (ref 0.9–1.3)
GFR AFRICAN AMERICAN: >60 ML/MIN/1.73M2
GFR NON-AFRICAN AMERICAN: 58 ML/MIN/1.73M2
GLUCOSE BLD-MCNC: 135 MG/DL (ref 70–99)
OSMOLALITY URINE: 166 MOS/L (ref 292–1090)
POTASSIUM SERPL-SCNC: 4.3 MMOL/L (ref 3.5–5.1)
SODIUM BLD-SCNC: 132 MMOL/L (ref 135–145)
SODIUM URINE: 18 MMOL/L (ref 35–167)
UREA NITROGEN, UR: 448 MG/DL

## 2020-09-13 PROCEDURE — 6370000000 HC RX 637 (ALT 250 FOR IP): Performed by: INTERNAL MEDICINE

## 2020-09-13 PROCEDURE — 6370000000 HC RX 637 (ALT 250 FOR IP): Performed by: STUDENT IN AN ORGANIZED HEALTH CARE EDUCATION/TRAINING PROGRAM

## 2020-09-13 PROCEDURE — 80048 BASIC METABOLIC PNL TOTAL CA: CPT

## 2020-09-13 PROCEDURE — 2580000003 HC RX 258: Performed by: NURSE PRACTITIONER

## 2020-09-13 PROCEDURE — 6360000002 HC RX W HCPCS: Performed by: NURSE PRACTITIONER

## 2020-09-13 PROCEDURE — 1200000000 HC SEMI PRIVATE

## 2020-09-13 PROCEDURE — 36415 COLL VENOUS BLD VENIPUNCTURE: CPT

## 2020-09-13 PROCEDURE — 6360000002 HC RX W HCPCS: Performed by: STUDENT IN AN ORGANIZED HEALTH CARE EDUCATION/TRAINING PROGRAM

## 2020-09-13 PROCEDURE — 94761 N-INVAS EAR/PLS OXIMETRY MLT: CPT

## 2020-09-13 PROCEDURE — 93010 ELECTROCARDIOGRAM REPORT: CPT | Performed by: INTERNAL MEDICINE

## 2020-09-13 PROCEDURE — 2580000003 HC RX 258: Performed by: STUDENT IN AN ORGANIZED HEALTH CARE EDUCATION/TRAINING PROGRAM

## 2020-09-13 PROCEDURE — 94640 AIRWAY INHALATION TREATMENT: CPT

## 2020-09-13 RX ORDER — ALLOPURINOL 100 MG/1
100 TABLET ORAL DAILY
Status: DISCONTINUED | OUTPATIENT
Start: 2020-09-13 | End: 2020-09-14 | Stop reason: HOSPADM

## 2020-09-13 RX ORDER — LORAZEPAM 1 MG/1
4 TABLET ORAL
Status: DISCONTINUED | OUTPATIENT
Start: 2020-09-13 | End: 2020-09-14 | Stop reason: HOSPADM

## 2020-09-13 RX ORDER — SODIUM CHLORIDE 0.9 % (FLUSH) 0.9 %
10 SYRINGE (ML) INJECTION EVERY 12 HOURS SCHEDULED
Status: DISCONTINUED | OUTPATIENT
Start: 2020-09-13 | End: 2020-09-14 | Stop reason: HOSPADM

## 2020-09-13 RX ORDER — FINASTERIDE 5 MG/1
5 TABLET, FILM COATED ORAL DAILY
Status: DISCONTINUED | OUTPATIENT
Start: 2020-09-13 | End: 2020-09-14 | Stop reason: HOSPADM

## 2020-09-13 RX ORDER — AMLODIPINE BESYLATE 5 MG/1
5 TABLET ORAL DAILY
Status: DISCONTINUED | OUTPATIENT
Start: 2020-09-13 | End: 2020-09-13

## 2020-09-13 RX ORDER — LORAZEPAM 2 MG/ML
1 INJECTION INTRAMUSCULAR
Status: DISCONTINUED | OUTPATIENT
Start: 2020-09-13 | End: 2020-09-14 | Stop reason: HOSPADM

## 2020-09-13 RX ORDER — PANTOPRAZOLE SODIUM 20 MG/1
20 TABLET, DELAYED RELEASE ORAL
Status: DISCONTINUED | OUTPATIENT
Start: 2020-09-13 | End: 2020-09-14 | Stop reason: HOSPADM

## 2020-09-13 RX ORDER — AMLODIPINE BESYLATE 10 MG/1
10 TABLET ORAL DAILY
Status: DISCONTINUED | OUTPATIENT
Start: 2020-09-14 | End: 2020-09-14 | Stop reason: HOSPADM

## 2020-09-13 RX ORDER — THIAMINE HYDROCHLORIDE 100 MG/ML
100 INJECTION, SOLUTION INTRAMUSCULAR; INTRAVENOUS DAILY
Status: DISCONTINUED | OUTPATIENT
Start: 2020-09-13 | End: 2020-09-13 | Stop reason: CLARIF

## 2020-09-13 RX ORDER — PREDNISONE 1 MG/1
5 TABLET ORAL DAILY
Status: DISCONTINUED | OUTPATIENT
Start: 2020-09-13 | End: 2020-09-14 | Stop reason: HOSPADM

## 2020-09-13 RX ORDER — LORAZEPAM 1 MG/1
3 TABLET ORAL
Status: DISCONTINUED | OUTPATIENT
Start: 2020-09-13 | End: 2020-09-14 | Stop reason: HOSPADM

## 2020-09-13 RX ORDER — LORAZEPAM 2 MG/ML
4 INJECTION INTRAMUSCULAR
Status: DISCONTINUED | OUTPATIENT
Start: 2020-09-13 | End: 2020-09-14 | Stop reason: HOSPADM

## 2020-09-13 RX ORDER — CELECOXIB 100 MG/1
200 CAPSULE ORAL DAILY
Status: DISCONTINUED | OUTPATIENT
Start: 2020-09-13 | End: 2020-09-14 | Stop reason: HOSPADM

## 2020-09-13 RX ORDER — CELECOXIB 400 MG/1
400 CAPSULE ORAL DAILY
Status: ON HOLD | COMMUNITY
End: 2020-09-14 | Stop reason: HOSPADM

## 2020-09-13 RX ORDER — TOLVAPTAN 15 MG/1
15 TABLET ORAL ONCE
Status: COMPLETED | OUTPATIENT
Start: 2020-09-13 | End: 2020-09-13

## 2020-09-13 RX ORDER — ACETAMINOPHEN 325 MG/1
650 TABLET ORAL EVERY 6 HOURS PRN
Status: DISCONTINUED | OUTPATIENT
Start: 2020-09-13 | End: 2020-09-14 | Stop reason: HOSPADM

## 2020-09-13 RX ORDER — LORAZEPAM 2 MG/ML
2 INJECTION INTRAMUSCULAR
Status: DISCONTINUED | OUTPATIENT
Start: 2020-09-13 | End: 2020-09-14 | Stop reason: HOSPADM

## 2020-09-13 RX ORDER — SODIUM CHLORIDE 0.9 % (FLUSH) 0.9 %
10 SYRINGE (ML) INJECTION PRN
Status: DISCONTINUED | OUTPATIENT
Start: 2020-09-13 | End: 2020-09-14 | Stop reason: HOSPADM

## 2020-09-13 RX ORDER — BUDESONIDE AND FORMOTEROL FUMARATE DIHYDRATE 160; 4.5 UG/1; UG/1
2 AEROSOL RESPIRATORY (INHALATION) 2 TIMES DAILY
Status: DISCONTINUED | OUTPATIENT
Start: 2020-09-13 | End: 2020-09-14 | Stop reason: HOSPADM

## 2020-09-13 RX ORDER — LORAZEPAM 2 MG/ML
3 INJECTION INTRAMUSCULAR
Status: DISCONTINUED | OUTPATIENT
Start: 2020-09-13 | End: 2020-09-14 | Stop reason: HOSPADM

## 2020-09-13 RX ORDER — ONDANSETRON 2 MG/ML
4 INJECTION INTRAMUSCULAR; INTRAVENOUS EVERY 6 HOURS PRN
Status: DISCONTINUED | OUTPATIENT
Start: 2020-09-13 | End: 2020-09-14 | Stop reason: HOSPADM

## 2020-09-13 RX ORDER — ACETAMINOPHEN 650 MG/1
650 SUPPOSITORY RECTAL EVERY 6 HOURS PRN
Status: DISCONTINUED | OUTPATIENT
Start: 2020-09-13 | End: 2020-09-14 | Stop reason: HOSPADM

## 2020-09-13 RX ORDER — LORAZEPAM 1 MG/1
1 TABLET ORAL
Status: DISCONTINUED | OUTPATIENT
Start: 2020-09-13 | End: 2020-09-14 | Stop reason: HOSPADM

## 2020-09-13 RX ORDER — LORAZEPAM 1 MG/1
2 TABLET ORAL
Status: DISCONTINUED | OUTPATIENT
Start: 2020-09-13 | End: 2020-09-14 | Stop reason: HOSPADM

## 2020-09-13 RX ORDER — POLYETHYLENE GLYCOL 3350 17 G/17G
17 POWDER, FOR SOLUTION ORAL DAILY PRN
Status: DISCONTINUED | OUTPATIENT
Start: 2020-09-13 | End: 2020-09-14 | Stop reason: HOSPADM

## 2020-09-13 RX ORDER — PROMETHAZINE HYDROCHLORIDE 12.5 MG/1
12.5 TABLET ORAL EVERY 6 HOURS PRN
Status: DISCONTINUED | OUTPATIENT
Start: 2020-09-13 | End: 2020-09-14 | Stop reason: HOSPADM

## 2020-09-13 RX ADMIN — FINASTERIDE 5 MG: 5 TABLET, FILM COATED ORAL at 09:10

## 2020-09-13 RX ADMIN — HYDRALAZINE HYDROCHLORIDE 10 MG: 20 INJECTION INTRAMUSCULAR; INTRAVENOUS at 02:18

## 2020-09-13 RX ADMIN — BUDESONIDE AND FORMOTEROL FUMARATE DIHYDRATE 2 PUFF: 160; 4.5 AEROSOL RESPIRATORY (INHALATION) at 19:44

## 2020-09-13 RX ADMIN — ALLOPURINOL 100 MG: 100 TABLET ORAL at 09:10

## 2020-09-13 RX ADMIN — CELECOXIB 200 MG: 100 CAPSULE ORAL at 12:42

## 2020-09-13 RX ADMIN — Medication 15 G: at 20:49

## 2020-09-13 RX ADMIN — AMLODIPINE BESYLATE 5 MG: 5 TABLET ORAL at 09:10

## 2020-09-13 RX ADMIN — PREDNISONE 5 MG: 5 TABLET ORAL at 09:10

## 2020-09-13 RX ADMIN — Medication 15 G: at 12:42

## 2020-09-13 RX ADMIN — ENOXAPARIN SODIUM 40 MG: 40 INJECTION SUBCUTANEOUS at 09:11

## 2020-09-13 RX ADMIN — PANTOPRAZOLE SODIUM 20 MG: 20 TABLET, DELAYED RELEASE ORAL at 06:16

## 2020-09-13 RX ADMIN — TOLVAPTAN 15 MG: 15 TABLET ORAL at 15:32

## 2020-09-13 RX ADMIN — THIAMINE HYDROCHLORIDE 100 MG: 100 INJECTION, SOLUTION INTRAMUSCULAR; INTRAVENOUS at 09:10

## 2020-09-13 ASSESSMENT — PAIN SCALES - GENERAL
PAINLEVEL_OUTOF10: 0
PAINLEVEL_OUTOF10: 0

## 2020-09-13 ASSESSMENT — ENCOUNTER SYMPTOMS
DIARRHEA: 0
NAUSEA: 0
COUGH: 0
COLOR CHANGE: 0
SHORTNESS OF BREATH: 0
WHEEZING: 0
ABDOMINAL PAIN: 0
RHINORRHEA: 0
CHEST TIGHTNESS: 0
SORE THROAT: 0
VOMITING: 0

## 2020-09-13 ASSESSMENT — PAIN DESCRIPTION - PAIN TYPE: TYPE: ACUTE PAIN

## 2020-09-13 NOTE — PROGRESS NOTES
Hospitalist Progress Note      Name:  Aislinn  /Age/Sex: 1940  (79 y.o. male)   MRN & CSN:  [id number] & [id number] Admission Date/Time: 2020  8:00 PM   Location:  [address] [place] [address] PCP: Deepthi Jones MD         Hospital Day: 2    Assessment and Plan:   Aislinn  is a 79 y.o.  male  who presents with Hyponatremia    1) Hyponatremia, chronic, likely multifactorial  -Still very concerning for SIADH as urine indices are indicative of such  -Na improving   -Nephrology on board    2) 2.1x1.2 cm nodule on CT chest  -Pulmonology on board  -Plan is for OP PET and biopsy  -MRI Brain ordered due to persistent dizziness    3) EtOH abuse  -CIWA-A  -Counseled to quit     Other chronic medical conditions:  -Essential Hypertension: Continue Amlodipine  -COPD: Continue home meds  -GERD: Continue home meds  -RA: Continue home meds     PT/OT ordered  Diet DIET GENERAL;   DVT Prophylaxis [] Lovenox, []  Heparin, [] SCDs, [] Ambulation   GI Prophylaxis [] PPI,  [] H2 Blocker,  [] Carafate,  [] Diet/Tube Feeds   Code Status Full Code   Disposition Home   MDM      History of Present Illness:     Patient was seen and examined  Reported lightheadedness  No chest pain, SOB  No fever, chills  No focal weakness     Ten point ROS reviewed negative, unless as noted above    Objective: Intake/Output Summary (Last 24 hours) at 2020 1045  Last data filed at 2020 0916  Gross per 24 hour   Intake 240 ml   Output 400 ml   Net -160 ml      Vitals:   Vitals:    20 0740   BP:    Pulse:    Resp:    Temp:    SpO2: 92%     Physical Exam:   GEN Awake male, sitting upright in bed in no apparent distress. Appears given age. EYES Pupils are equally round. No scleral erythema, discharge, or conjunctivitis. HENT Mucous membranes are moist. Oral pharynx without exudates, no evidence of thrush. NECK Supple, no apparent thyromegaly or masses. RESP Clear to auscultation, no wheezes, rales or rhonchi. Symmetric chest movement while on room air. CARDIO/VASC S1/S2 auscultated. Regular rate without appreciable murmurs, rubs, or gallops. No JVD or carotid bruits. Peripheral pulses equal bilaterally and palpable. No peripheral edema. GI Abdomen is soft without significant tenderness, masses, or guarding. Bowel sounds are normoactive. Rectal exam deferred.  No costovertebral angle tenderness. Normal appearing external genitalia. Castillo catheter is not present. HEME/LYMPH No palpable cervical lymphadenopathy and no hepatosplenomegaly. No petechiae or ecchymoses. MSK No gross joint deformities. SKIN Normal coloration, warm, dry. NEURO Cranial nerves appear grossly intact, normal speech, no lateralizing weakness. PSYCH Awake, alert, oriented x 4. Affect appropriate.     Medications:   Medications:    sodium chloride flush  10 mL Intravenous 2 times per day    enoxaparin  40 mg Subcutaneous Daily    thiamine (VITAMIN B1) IVPB  100 mg Intravenous Daily    budesonide-formoterol  2 puff Inhalation BID    finasteride  5 mg Oral Daily    allopurinol  100 mg Oral Daily    pantoprazole  20 mg Oral QAM AC    predniSONE  5 mg Oral Daily    [START ON 9/14/2020] amLODIPine  10 mg Oral Daily      Infusions:   PRN Meds: sodium chloride flush, 10 mL, PRN  acetaminophen, 650 mg, Q6H PRN    Or  acetaminophen, 650 mg, Q6H PRN  polyethylene glycol, 17 g, Daily PRN  promethazine, 12.5 mg, Q6H PRN    Or  ondansetron, 4 mg, Q6H PRN  LORazepam, 1 mg, Q1H PRN    Or  LORazepam, 1 mg, Q1H PRN    Or  LORazepam, 2 mg, Q1H PRN    Or  LORazepam, 2 mg, Q1H PRN    Or  LORazepam, 3 mg, Q1H PRN    Or  LORazepam, 3 mg, Q1H PRN    Or  LORazepam, 4 mg, Q1H PRN    Or  LORazepam, 4 mg, Q1H PRN          Electronically signed by Neeru Lucas MD on 9/13/2020 at 10:45 AM

## 2020-09-13 NOTE — ED PROVIDER NOTES
Emergency [address] [place] [address] EMERGENCY DEPARTMENT    Patient: Purnima Damon  MRN: [id number]  : 1940  Date of Evaluation: 2020  ED Provider: Marissa Bronson PA-C    Chief Complaint       Chief Complaint   Patient presents with    Dizziness     admitted on thursday for same, AL'ed today at 11 am.     Fatigue    Constipation       Harris Delay is a 79 y.o. male who presents to the emergency department for fatigue, lightheadedness. Patient was actually discharged from the hospital at 11am today. He reports that over the last couple of weeks, he has been sleeping more, unable to walk his normal 4 miles/day, feels lightheaded when standing. He was found to be hyponatremic and admitted. When he went home today, he states he still felt lightheaded. He denies any headache or dizziness/room spinning sensation. His wife had to borrow a walker from a neighbor in order for him to ambulate on his own. He then went home and slept and around 7pm, wife states she decided that he needed to come back out to the hospital.  She states he was confused by this, thought that it was still morning and asked to stay home until the end of the day. She also states he was confused upon arrival at the hospital--thought he was going directly to an inpatient room. Patient denies any fever or chills. He has a chronic cough for which he has seen ENT and his PCP. Denies chest pain or sob. Denies n/v.  He has been constipated for a couple days. Denies any numbness or tingling. ROS     CONSTITUTIONAL:  Denies fever. + fatigue/somnolence, lightheadedness. EYES:  Denies visual changes. HEAD:  Denies headache. ENT:  Denies earache, nasal congestion, sore throat. NECK:  Denies neck pain. RESPIRATORY:  Denies any shortness of breath. CARDIOVASCULAR:  Denies chest pain. GI:  Denies nausea or vomiting.  + constipation.   :  Denies urinary symptoms. MUSCULOSKELETAL:  Denies extremity pain or swelling. BACK:  Denies back pain. INTEGUMENT:  Denies skin changes. LYMPHATIC:  Denies lymphadenopathy. NEUROLOGIC:  Denies any numbness/tingling. PSYCHIATRIC:  Denies SI/HI.     Past History     Past Medical History:   Diagnosis Date    Acid reflux     Arthritis     \"Wrists\"    Benign prostatic hyperplasia     without outflow obstruction    BPH (benign prostatic hyperplasia)     [facility]chronic obstructive [facility] disease) ([facility])     Dizziness     DVT (deep venous thrombosis) ([facility])     Right Leg In Late 1980's, Right Leg In 2002    Enlarged prostate     Essential hypertension     Glaucoma     Bilateral Eyes    Gout     Andreafski (hard of hearing)     Bilateral Hearing Aids    Hx of blood clots Dx Late 1980's And 2002    \"DVT Right Leg\"    Hypertension     Impaired glucose tolerance     Pneumonia 5/15/2018    Slow urinary stream     Syncope     with loss of consciousness    Teeth missing     Upper And Lower    Wears glasses      Past Surgical History:   Procedure Laterality Date    ABDOMINAL EXPLORATION SURGERY  1984    \"Intestines Were Tangled, Had Salmonella Poisoning\"    APPENDECTOMY      COLONOSCOPY  2000's    DENTAL SURGERY      Teeth Extracted In Past    EYE SURGERY Right 1990's    Cataract With Lens Implant    EYE SURGERY Left 2016    Cataract With Lens Implant    INGUINAL HERNIA REPAIR Right 2000    With Mesh    INGUINAL HERNIA REPAIR Right 02/28/2018    With mesh and plug    VASECTOMY  1984     Social History     Socioeconomic History    Marital status:      Spouse name: None    Number of children: None    Years of education: None    Highest education level: None   Occupational History    None   Social Needs    Financial resource strain: None    Food insecurity     Worry: None     Inability: None    Transportation needs     Medical: None     Non-medical: None   Tobacco Use    Smoking status: Former Smoker Packs/day: 1.00     Years: 29.00     Pack years: 29.00     Types: Cigarettes, Pipe     Start date:      Last attempt to quit: 1984     Years since quittin.7    Smokeless tobacco: Never Used   Substance and Sexual Activity    Alcohol use: Yes     Alcohol/week: 2.0 - 3.0 standard drinks     Types: 2 - 3 Glasses of wine per week     Comment: \"2 Glasses Of Wine Daily\"    Drug use: No    Sexual activity: Not Currently   Lifestyle    Physical activity     Days per week: None     Minutes per session: None    Stress: None   Relationships    Social connections     Talks on phone: None     Gets together: None     Attends Worship service: None     Active member of club or organization: None     Attends meetings of clubs or organizations: None     Relationship status: None    Intimate partner violence     Fear of current or ex partner: None     Emotionally abused: None     Physically abused: None     Forced sexual activity: None   Other Topics Concern    None   Social History Narrative    None       Medications/Allergies     Previous Medications    ADVAIR DISKUS 250-50 MCG/DOSE AEPB    Inhale 1 puff into the lungs 2 times daily    ALFUZOSIN (UROXATRAL) 10 MG EXTENDED RELEASE TABLET    Take 10 mg by mouth daily    ALLOPURINOL (ZYLOPRIM) 100 MG TABLET    Take 1 tablet by mouth daily    AMLODIPINE (NORVASC) 5 MG TABLET    Take 1 tablet by mouth daily    FINASTERIDE (PROSCAR) 5 MG TABLET    Take 1 tablet by mouth daily    GUAIFENESIN (MUCINEX) 600 MG EXTENDED RELEASE TABLET    Take 2 tablets by mouth every 12 hours for 14 days Drink plenty of water.     LANSOPRAZOLE (PREVACID) 30 MG DELAYED RELEASE CAPSULE    Take 30 mg by mouth daily    LISINOPRIL (PRINIVIL;ZESTRIL) 10 MG TABLET    Take 1 tablet by mouth daily    PREDNISONE (DELTASONE) 5 MG TABLET    Take 5 mg by mouth daily     VITAMIN B-1 100 MG TABLET    Take 1 tablet by mouth daily    ZOSTER RECOMBINANT ADJUVANTED VACCINE (SHINGRIX) 50 MCG/0.5ML SUSR INJECTION    50 MCG IM then repeat 2-6 months. No Known Allergies     Physical Exam       ED Triage Vitals   BP Temp Temp src Pulse Resp SpO2 Height Weight   09/12/20 1940 09/12/20 1958 -- 09/12/20 1940 09/12/20 1940 09/12/20 1940 09/12/20 1932 09/12/20 1932   93/62 98.2 °F (36.8 °C)  76 15 95 % 6' 1\" (1.854 m) 189 lb (85.7 kg)     GENERAL APPEARANCE:  Well-developed, well-nourished, no acute distress. HEAD:  NC/AT. EYES:  Sclera anicteric. ENT:  Ears, nose, mouth normal.     NECK:  Supple. CARDIO:  RRR. LUNGS:   CTAB. Respirations unlabored. ABDOMEN:  Soft, non-distended, non-tender. BS active. EXTREMITIES:  No acute deformities. SKIN:  Warm and dry. NEUROLOGICAL:  Alert and oriented. Speech is clear. No facial droop. Strength symmetric in the bilateral UE and LE. Sensation intact. Normal finger-nose, heel-shin. No pronator drift. PSYCHIATRIC:  Normal mood.      Diagnostics     Labs:  Results for orders placed or performed during the hospital encounter of 09/12/20   CBC Auto Differential   Result Value Ref Range    WBC 5.5 4.0 - 10.5 K/CU MM    RBC 3.53 (L) 4.6 - 6.2 M/CU MM    Hemoglobin 11.8 (L) 13.5 - 18.0 GM/DL    Hematocrit 34.9 (L) 42 - 52 %    MCV 98.9 78 - 100 FL    MCH 33.4 (H) 27 - 31 PG    MCHC 33.8 32.0 - 36.0 %    RDW 12.9 11.7 - 14.9 %    Platelets 691 551 - 765 K/CU MM    MPV 9.8 7.5 - 11.1 FL    Differential Type AUTOMATED DIFFERENTIAL     Segs Relative 72.1 (H) 36 - 66 %    Lymphocytes % 14.6 (L) 24 - 44 %    Monocytes % 10.4 (H) 0 - 4 %    Eosinophils % 2.2 0 - 3 %    Basophils % 0.5 0 - 1 %    Segs Absolute 3.9 K/CU MM    Lymphocytes Absolute 0.8 K/CU MM    Monocytes Absolute 0.6 K/CU MM    Eosinophils Absolute 0.1 K/CU MM    Basophils Absolute 0.0 K/CU MM    Nucleated RBC % 0.0 %    Total Nucleated RBC 0.0 K/CU MM    Total Immature Neutrophil 0.01 K/CU MM    Immature Neutrophil % 0.2 0 - 0.43 %   Comprehensive Metabolic Panel w/ Reflex to MG   Result Value Ref Range the right lower lobe corresponding to pulmonary nodule on comparison CT from September 10, 2020. Mild left basilar atelectasis. No pleural effusion or pneumothorax. Osseous structures appear stable. Bones are osteopenic. 1. No acute cardiopulmonary process identified. Redemonstration of nodule within the right lower lobe is noted on comparison CT. Again, recommend continue follow-up as on comparison CT from September 10, 2020. Xr Chest (2 Vw)    Result Date: 9/10/2020  EXAMINATION: TWO XRAY VIEWS OF THE CHEST 9/10/2020 1:35 pm COMPARISON: April 8, 2019 HISTORY: ORDERING SYSTEM PROVIDED HISTORY: general fatigue TECHNOLOGIST PROVIDED HISTORY: Reason for exam:->general fatigue Reason for Exam: general fatigue Acuity: Acute Type of Exam: Initial FINDINGS: Stable cardiomediastinal silhouette. Bibasilar atelectasis is noted. Chronic interstitial changes are noted. There is no pleural effusion or pneumothorax. 1.7 cm nodular density is noted in the right midlung field, new since prior examination. The osseous structures are stable. 1.  1.7 cm nodular density in the right midlung field, new since prior examination. Further evaluation with CT chest is recommended. Xr Abdomen (kub) (single Ap View)    Result Date: 9/12/2020  EXAMINATION: ONE SUPINE XRAY VIEW(S) OF THE ABDOMEN 9/12/2020 7:58 pm COMPARISON: None. HISTORY: ORDERING SYSTEM PROVIDED HISTORY: Constipation TECHNOLOGIST PROVIDED HISTORY: Reason for exam:->Constipation Reason for Exam: Constipation Acuity: Acute Type of Exam: Initial Mechanism of Injury: Constipation Relevant Medical/Surgical History: Constipation FINDINGS: The lung bases are clear. Normal volume of gas and stool in the colon. No air-filled distended loops of bowel. No pneumoperitoneum. No acute osseous abnormality. Normal volume of gas and stool in the colon. Nonobstructive bowel gas pattern.      Ct Head Wo Contrast    Result Date: 9/12/2020  EXAMINATION: CT OF THE HEAD WITHOUT CONTRAST  9/12/2020 9:27 pm TECHNIQUE: CT of the head was performed without the administration of intravenous contrast. Dose modulation, iterative reconstruction, and/or weight based adjustment of the mA/kV was utilized to reduce the radiation dose to as low as reasonably achievable. COMPARISON: CT brain 04/08/2019. HISTORY: ORDERING SYSTEM PROVIDED HISTORY: Dizziness TECHNOLOGIST PROVIDED HISTORY: Reason for exam:->Dizziness Has a \"code stroke\" or \"stroke alert\" been called? ->No Reason for Exam: dizzy/unsteady Acuity: Acute Type of Exam: Initial FINDINGS: BRAIN/VENTRICLES: There is no acute intracranial hemorrhage, mass effect or midline shift. No abnormal extra-axial fluid collection. The gray-white differentiation is maintained without evidence of an acute infarct. There is no evidence of hydrocephalus. Mild generalized parenchymal volume loss and chronic periventricular white matter hypoattenuation are seen. ORBITS: The visualized portion of the orbits demonstrate no acute abnormality. SINUSES: The visualized paranasal sinuses and mastoid air cells demonstrate no acute abnormality. SOFT TISSUES/SKULL:  No acute abnormality of the visualized skull or soft tissues. No acute intracranial abnormality. Ct Chest W Contrast    Result Date: 9/10/2020  EXAMINATION: CT OF THE CHEST WITH CONTRAST 9/10/2020 5:15 pm TECHNIQUE: CT of the chest was performed with the administration of intravenous contrast. Multiplanar reformatted images are provided for review. Dose modulation, iterative reconstruction, and/or weight based adjustment of the mA/kV was utilized to reduce the radiation dose to as low as reasonably achievable. COMPARISON: Chest radiograph 09/10/2020. CT chest angiogram 05/15/2018.  HISTORY: ORDERING SYSTEM PROVIDED HISTORY: hyponatremia, abnormal Chest xray TECHNOLOGIST PROVIDED HISTORY: Reason for exam:->hyponatremia, abnormal Chest xray FINDINGS: Mediastinum: The thoracic aorta is normal in caliber with mild to moderate atherosclerotic vascular disease. Coronary artery atherosclerotic vascular calcifications are seen. No acute abnormality in the branch vessels of the superior mediastinum and lower neck. The main pulmonary artery is enlarged measuring approximately 3.7 cm in diameter. No central pulmonary embolism. The heart is normal in size. No pericardial effusion. Moderate hiatal hernia. The mediastinal esophagus and thyroid gland are unremarkable. No lymphadenopathy. Lungs/pleura: The central airways are patent. No pleural effusion or pneumothorax. Mild bilateral dependent atelectasis. Respiratory motion limits evaluation of the lungs. No consolidation or interlobular septal thickening. There is a 2.1 x 1.2 cm nodule in the superior segment of the right lower lobe on image 57 series 3 new from 2018. Upper Abdomen: The bilateral adrenal glands are normal in appearance. Limited images through the upper abdomen demonstrate no acute process. Soft Tissues/Bones: No acute osseous or soft tissue abnormality. 1. 2.1 x 1.2 cm nodule in the superior segment of the right lower lobe new from 2018. See recommendations below. 2. Enlarged main pulmonary artery as can be seen with pulmonary hypertension. RECOMMENDATIONS: Fleischner Society guidelines for follow-up and management of incidentally detected pulmonary nodules: Single Solid Nodule: Nodule size greater than 8 mm In a low-risk patient, consider CT at 3 months, PET/CT, or tissue sampling. In a high-risk patient, consider CT at 3 months, PET/CT, or tissue sampling. - Low risk patients include individuals with minimal or absent history of smoking and other known risk factors. - High risk patients include individuals with a history or smoking or known risk factors. Radiology 2017 http://pubs. rsna.org/doi/full/10.1148/radiol. 8732984133     Procedures/EKG:   Please see Dr. Hazel Obando note for interpretation.     ED Course and MDM   -  Patient seen and evaluated in the emergency department. -  Triage and nursing notes reviewed and incorporated. -  Old chart records reviewed and incorporated. -  Supervising physician was Dr. Ursula Harris. Patient was seen independently. -  Work-up included:  See above  -  ED medications:  NS infusion  -  Patient does admit to being a daily drinker--2-4 glasses of beer/wine daily. Last drink was 3 days ago. I do not appreciate any signs of withdrawal at this time.    -  Results discussed with patient and wife. CT head is negative for acute findings. CXR stable--again note of pulmonary nodule. UA unremarkable. No leukocytosis. Na is 125. I did have Case Management meet with patient and wife. They do admit they declined [address] during previous admission because they are both very independent with ADLs. However, both admit that patient is currently unsafe at home and are agreeable with admission for further evaluation. Wife did note that symptoms were thought to be due to [facility] during admission but she called their pharmacist and confirmed that he is on Lisinopril and no HCTZ. Made hospitalist aware of this. I spoke with Dr. Arpita Stroud, hospitalist, who will see and admit. In light of current events, I did utilize appropriate PPE (including surgical face mask, safety glasses, and gloves, as recommended by the health facility/national standard best practice, during my bedside interactions with the patient. Patient was also masked throughout ED course. Final Impression      1. Hyponatremia    2. Lightheadedness    3. Hypersomnolence    4.  Lung nodule            DISPOSITION        Sabrina Vila, [address], [place]  09/13/20 0020

## 2020-09-13 NOTE — ED NOTES
Assisted patient to the restroom via wheelchair. Patient changed into gown. Asked Brittney KELSEY to give family and patient an update. Will continue to monitor patient. Call light within reach. Wife at bedside. Denies needing anything at this time.       Sushma Kat RN  09/12/20 [id number]

## 2020-09-13 NOTE — H&P
History and Physical      Name:  Morales Daley /Age/Sex: 1940  (79 y.o. male)   MRN & CSN:  [id number] & [id number] Admission Date/Time: 2020  8:00 PM   Location:  [address] [place] [address] PCP: Jaylyn Yusuf MD       Hospital Day: 2    Assessment and Plan:   Morales Daley is a 79 y.o.  male  who presents with Hyponatremia    1. Hyponatremia, chronic, likely multifactorial   Possible SIADH from lung nodule versus recent diuretic use versus beer potomania   Sodium on admission 125   Pending ADH and urine nitrogen   Urine sodium of 64 and urine osmolality of 302 from previous admission   Continue normal saline for hydration and monitor correction rate closely   Nephrology   Fall precautions, seizure precautions, up with assistance, and neuro checks    2. 1.7 cm nodule on chest x-ray   Patient is following pulmonology outpatient   Question whether this nodule could be causing a paraneoplastic syndrome leading to SIADH    3. EtOH abuse   [facility] alcohol withdrawal protocol   Continue home thiamine   Counseled    Other chronic medical conditions:     Hypertension: Continue lisinopril   COPD: Continue home meds   GERD: Continue home meds   RA: Continue home meds    Diet DIET GENERAL;   DVT Prophylaxis [] Lovenox, []  Heparin, [] SCDs, [] Ambulation   GI Prophylaxis [] PPI,  [] H2 Blocker,  [] Carafate,  [] Diet/Tube Feeds   Code Status Full Code   Disposition Patient requires continued admission due to Hyponatremia   MDM [] Low, [] Moderate,[x]  High  Patient's risk as above due to acuity of condition with potential for decompensation.      History of Present Illness:     Chief Complaint: Hyponatremia    Morales Daley is a 79 y.o.  male with a PMH of hypertension, COPD, BPH, and RA, who presents with fatigue and lightheadedness after being discharged from the hospital today at 6 PM.  Patient reports deconditioning over the last couple of weeks to the point where this afternoon after being discharged from the hospital was using a walker. Patient prior to this was ambulating 4+ miles a day. When discussing his lightheadedness he denies the sensation of room spinning or any syncopal episodes. Patient does endorse drinking 1 beer today with 3 glasses of wine. When discussing the pours of wine his wife states they are more than 1 serving size. Patient denies any fevers, chills, chest pain, shortness of breath, nausea, vomiting, or diarrhea. Patient does endorse constipation. Discussed case with ED provider. Review of Systems   Constitutional: Positive for fatigue. Negative for appetite change, diaphoresis and fever. HENT: Negative for congestion, rhinorrhea and sore throat. Eyes: Negative for visual disturbance. Respiratory: Negative for cough, chest tightness, shortness of breath and wheezing. Cardiovascular: Negative for chest pain and palpitations. Gastrointestinal: Negative for abdominal pain, diarrhea, nausea and vomiting. Genitourinary: Negative for dysuria, frequency and urgency. Musculoskeletal: Negative for arthralgias. Skin: Negative for color change and rash. Neurological: Positive for weakness and light-headedness. Negative for dizziness, seizures, numbness and headaches. Psychiatric/Behavioral: Negative for agitation. Objective: Intake/Output Summary (Last 24 hours) at 9/13/2020 0408  Last data filed at 9/12/2020 2107  Gross per 24 hour   Intake --   Output 200 ml   Net -200 ml      Vitals:   Vitals:    09/13/20 0330   BP: 131/71   Pulse: 85   Resp: 16   Temp: 97.9 °F (36.6 °C)   SpO2: 92%     Physical Exam:   Physical Exam  Vitals signs and nursing note reviewed. Constitutional:       General: He is not in acute distress. Appearance: Normal appearance. He is not ill-appearing. HENT:      Head: Atraumatic. Right Ear: External ear normal.      Left Ear: External ear normal.      Nose: Nose normal. No rhinorrhea.       Mouth/Throat: Mouth: Mucous membranes are moist.   Eyes:      General: No scleral icterus. Conjunctiva/sclera: Conjunctivae normal.      Pupils: Pupils are equal, round, and reactive to light. Neck:      Musculoskeletal: Neck supple. Cardiovascular:      Rate and Rhythm: Normal rate and regular rhythm. Heart sounds: No murmur. No gallop. Pulmonary:      Effort: Pulmonary effort is normal.      Breath sounds: No wheezing, rhonchi or rales. Abdominal:      General: There is no distension. Palpations: Abdomen is soft. Tenderness: There is no abdominal tenderness. There is no guarding. Musculoskeletal: Normal range of motion. Skin:     General: Skin is warm and dry. Capillary Refill: Capillary refill takes less than 2 seconds. Neurological:      General: No focal deficit present. Mental Status: He is alert and oriented to person, place, and time. Mental status is at baseline. Cranial Nerves: Cranial nerves are intact. No cranial nerve deficit, dysarthria or facial asymmetry. Sensory: Sensation is intact. No sensory deficit. Motor: Motor function is intact. No weakness, tremor, seizure activity or pronator drift. Coordination: Coordination is intact.    Psychiatric:         Mood and Affect: Mood normal.         Behavior: Behavior normal.         Past Medical History:      Past Medical History:   Diagnosis Date    Acid reflux     Arthritis     \"Wrists\"    Benign prostatic hyperplasia     without outflow obstruction    BPH (benign prostatic hyperplasia)     [facility]     Dizziness     DVT (deep venous thrombosis) ([facility])     Right Leg In Late 1980's, Right Leg In 2002    Enlarged prostate     Essential hypertension     Glaucoma     Bilateral Eyes    Gout     [place] (hard of hearing)     Bilateral Hearing Aids    Hx of blood clots Dx Late 1980's And 2002    \"DVT Right Leg\"    Hypertension     Impaired glucose tolerance     Pneumonia 5/15/2018    Slow urinary stream     Syncope     with loss of consciousness    Teeth missing     Upper And Lower    Wears glasses      PSHX:  has a past surgical history that includes eye surgery (Right, ); eye surgery (Left, ); Dental surgery; Colonoscopy (); Inguinal hernia repair (Right, ); Vasectomy (); Abdominal exploration surgery (); Inguinal hernia repair (Right, 2018); and Appendectomy. Allergies: No Known Allergies    FAM HX: family history includes Dementia in his mother; Diabetes type 2  in his father. Soc HX:   Social History     Socioeconomic History    Marital status:      Spouse name: None    Number of children: None    Years of education: None    Highest education level: None   Occupational History    None   Social Needs    Financial resource strain: None    Food insecurity     Worry: None     Inability: None    Transportation needs     Medical: None     Non-medical: None   Tobacco Use    Smoking status: Former Smoker     Packs/day: 1.00     Years: 29.00     Pack years: 29.00     Types: Cigarettes, Pipe     Start date:      Last attempt to quit:      Years since quittin.7    Smokeless tobacco: Never Used   Substance and Sexual Activity    Alcohol use:  Yes     Alcohol/week: 2.0 - 3.0 standard drinks     Types: 2 - 3 Glasses of wine per week     Comment: \"2 Glasses Of Wine Daily\"    Drug use: No    Sexual activity: Not Currently   Lifestyle    Physical activity     Days per week: None     Minutes per session: None    Stress: None   Relationships    Social connections     Talks on phone: None     Gets together: None     Attends Jehov[employer] witness service: None     Active member of club or organization: None     Attends meetings of clubs or organizations: None     Relationship status: None    Intimate partner violence     Fear of current or ex partner: None     Emotionally abused: None     Physically abused: None     Forced sexual activity: None   Other Topics Concern    None   Social History Narrative    None       Medications:   Medications:    sodium chloride flush  10 mL Intravenous 2 times per day    enoxaparin  40 mg Subcutaneous Daily    thiamine (VITAMIN B1) IVPB  100 mg Intravenous Daily    budesonide-formoterol  2 puff Inhalation BID    amLODIPine  5 mg Oral Daily    finasteride  5 mg Oral Daily    allopurinol  100 mg Oral Daily    pantoprazole  20 mg Oral QAM AC    predniSONE  5 mg Oral Daily      Infusions:    sodium chloride 75 mL/hr at 09/12/20 2200     PRN Meds: sodium chloride flush, 10 mL, PRN  acetaminophen, 650 mg, Q6H PRN    Or  acetaminophen, 650 mg, Q6H PRN  polyethylene glycol, 17 g, Daily PRN  promethazine, 12.5 mg, Q6H PRN    Or  ondansetron, 4 mg, Q6H PRN  LORazepam, 1 mg, Q1H PRN    Or  LORazepam, 1 mg, Q1H PRN    Or  LORazepam, 2 mg, Q1H PRN    Or  LORazepam, 2 mg, Q1H PRN    Or  LORazepam, 3 mg, Q1H PRN    Or  LORazepam, 3 mg, Q1H PRN    Or  LORazepam, 4 mg, Q1H PRN    Or  LORazepam, 4 mg, Q1H PRN        Electronically signed by Terri Wooten MD on 9/13/2020 at 4:08 AM

## 2020-09-13 NOTE — ED PROVIDER NOTES
EKG Interpretation    EKG performed on 9/12/2020 at 1943    Interpreted by emergency department physician    Rhythm: normal sinus  and sinus arrhythmia  Rate: normal  Axis: normal  Ectopy: none  Conduction: right bundle branch block  ST Segments: no acute change  T Waves: non specific changes  Q Waves: none    Clinical Impression: Sinus rhythm with sinus arrhythmia.   Right bundle branch block pattern    Sven Oconnor 26, DO  09/12/20 2029

## 2020-09-13 NOTE — CONSULTS
[address], [address] [facility]                                  CONSULTATION    PATIENT NAME: Cecily Gaspar                   :        1940  MED REC NO:   [id number]                          ROOM:       4743  ACCOUNT NO:   [de-identified]                           ADMIT DATE: 2020  PROVIDER:     Eliseo Deleon MD    CONSULT DATE:  2020    CONSULT REQUESTED BY:  Luis Vazquez DO    REASON FOR CONSULT:  Hyponatremia. BRIEF HISTORY:  The patient is an 78-year-old male who I just met when  he was admitted with low sodium with some symptoms and discharged home  yesterday at 11:00 a.m. only to come back because of fatigue, tiredness,  unable to walk, weakness, etc.  In the emergency room, the repeat sodium  was 125, which is higher than what he was during the discharge because  he got a dose of tolvaptan. He was subsequently admitted for further  evaluation because mainly of his symptoms of course. In the emergency  room, he did have another repeat CT of the head and chest x-ray as well  as abdominal x-ray, all were not revealing, and as I mentioned, the  sodium indeed was 125, which is higher than 123. Other tests include  CBC, which showed hemoglobin of 11.8, normal white count and platelet  count. His proBNP level was 247. Troponin was negative. Lipase is  okay. Alcohol level was undetectable because he has had a chance to  drink alcohol, looks like. Urinalysis showed very low specific gravity,  but otherwise bland urine. I met him on 09/10/2020, looks like he has had intermittent chronic  hyponatremia going back to . Lately though, it was a little  worsened, running in low 120s or so. During the admission, his sodium  was 116 or 118 I think.   I did some workup at that time, although some  of them were nondiagnostic because urinary indices were done with normal  saline, but given the available symptoms. CURRENT MEDICATIONS:  Here in the hospital, he is on allopurinol 100 mg  daily, amlodipine 10 mg a day, which was temporarily done for  hypertension, Celebrex I had to resume the morning after talking to him,  he is also on Protonix, prednisone, tolvaptan, which I gave one dose,  urea powder I added, and of course normal saline I discontinued in the  morning when I saw him in the room. HABITS:  He quit smoking 40 years ago. He has a roughly 20-pack-year  history. He drinks daily, but not more than a couple of john a day,  obviously one can see the alcohol level was negative. He did not really  have any withdrawal last time, I did not anticipate him to have. No  history of illicit drug abuse. 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 FAMILY MEDICAL HISTORY:  Only significant for mother having dementia. Nobody has electrolyte imbalance in his family. SOCIAL HISTORY:  The patient is originally from [place], [place]. He has been residing here in [facility] at least for the last 23 years. He is  for the last 45 years. He has two biological children. 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 He is active otherwise, all thing considered,  even at his age of 79. He is retired now and enjoys himself here in  town with his wife. REVIEW OF SYSTEMS:  As I mentioned, when he went home, he was really  weak, could not walk, did not feel very well, lightheadedness, which  made him to come back to the emergency room. Rest of the review of  systems is negative or as in previous paragraph. ALLERGY:  He is listed allergic to actually no new medication.     PHYSICAL EXAMINATION:  VITAL SIGNS:  At the time of examination revealed temperature, again  this is an automatic, so 98.1; blood pressure 150s/70s, I need to  probably do manual to make sure blood pressure is okay; respiratory rate  15, pulse 89; he is saturating about 92%. GENERAL:  The patient is without any acute distress. He is not happy  that he had to come back. I do not blame him of course, we could not  anticipate that will happened, otherwise we would not discharge him. HEENT:  Head and Neck:  Normocephalic and atraumatic. Eyes:  May be a  little bit of mild conjunctival pallor. Ears, Mouth, and Throat:   Normal posterior oropharynx. CARDIOVASCULAR:  Regular rhythm. RESPIRATORY:  Clear to auscultation. ABDOMEN:  Soft. EXTREMITIES:  No edema. LABORATORY VALUES AND ANCILLARY SERVICES:  As mentioned earlier, sodium  was actually 125 and other parameters as mentioned earlier. IMPRESSION:  An 24-year-old male with acute-on-chronic hyponatremia. 1.  Acute-on-chronic hyponatremia. Sodium is better than last admission  of course. Available data suggested ectopic ADH secretion, especially  with lung nodule. Sodium is better with several empirical therapies. 2.  Lung nodule. I suspect whether it is causing ADH secretion. 3.  Hypertension, rheumatoid arthritis, venous thromboembolism, BPH,  etc.    PLAN:  I will go ahead and discontinue normal saline. There is no  reason to be hypovolemic, no history. His blood pressure is rather  high. Again, the urine specific gravity was very low and low serum  osmolality, low sodium, which all can be explained by the tolvaptan,  looks like it is doing its job, which is making even more suspicious  that he does have ectopic ADH secretion. I will go ahead and give him  one more dose. I do not want leave any suspicion behind that  hyponatremia causing some of the symptoms, so slowly but surely, I would  try to raise it to at least 135-iss or so whatever medication I need to  apply, but I would make sure he does not go beyond 6 mEq/liter/24 hours. We will get another stat BNP.   I would go ahead and start p.o. urea  powder, fluid restriction, and follow clinically.         Sebastian Rubinstein, MD    D: 09/13/2020 15:00:57       T: 09/13/2020 17:55:46     [email or web address]  Job#: [id number]     Doc#: [id number]    CC:

## 2020-09-13 NOTE — PROGRESS NOTES
Chart briefly reviewed  Na 125   Had 1 dose of tolvaptan yesterday ( 15 mg )   Ectopic ADH secretion timmy from lung was suspected  D/C NS  I have called  Lab  to add urine Na and  Osm   From  ER urine specimen   Fluid restriction to 1.2 L/d now   ]full consult to come   'arginine vasopressin level  was ordered  - pending

## 2020-09-13 NOTE — ED NOTES
Last known drink was Wednesday evening at 2300. Patient drinks about 2 to 4 glasses a night for the past 38 years.       Shaq Zavaleta RN  09/12/20 220

## 2020-09-13 NOTE — CONSULTS
Pt seen ,examined,interviewed and chart reviewed. Please see the dictated consult for details     Imp :   1. Ac on ch hyponatremia- Na better  Than  last adm - ectopic  ADH secretion timmy from lung nodule was suspected - ( he was on HCTZ- but that alone does not explain his degree of na and lack of response )   2. Lung nodule- ADH pending  3. HTN/ RA /VTE/ BPH    Plan:  1. D/c NS  2. Urine sp gravity was very low and low serum osm and low urinary na - taken together -  I suspect die to tolvaptan that he had - so it must be working- making it more suspicious for ectopic ADH  3. 1 more dose   4. Goal is to slowly ( not more than 6-8 meq/l/24h) raise  the na to at least mid 130- so no confusion the his sx from hyponatremia   5. Stat BMP  6. Po  Urea Fluid restriction  7.  Labs in am       Thanks for the consult    #[id number]

## 2020-09-14 ENCOUNTER — APPOINTMENT (OUTPATIENT)
Dept: MRI IMAGING | Age: 80
DRG: 644 | End: 2020-09-14
Payer: MEDICARE

## 2020-09-14 VITALS
TEMPERATURE: 98.2 F | DIASTOLIC BLOOD PRESSURE: 82 MMHG | BODY MASS INDEX: 23.27 KG/M2 | SYSTOLIC BLOOD PRESSURE: 159 MMHG | WEIGHT: 175.6 LBS | OXYGEN SATURATION: 97 % | RESPIRATION RATE: 18 BRPM | HEIGHT: 73 IN | HEART RATE: 63 BPM

## 2020-09-14 LAB
ALBUMIN SERPL-MCNC: 3.8 GM/DL (ref 3.4–5)
ALP BLD-CCNC: 44 IU/L (ref 40–128)
ALT SERPL-CCNC: 14 U/L (ref 10–40)
ANION GAP SERPL CALCULATED.3IONS-SCNC: 13 MMOL/L (ref 4–16)
AST SERPL-CCNC: 18 IU/L (ref 15–37)
BILIRUB SERPL-MCNC: 0.4 MG/DL (ref 0–1)
BUN BLDV-MCNC: 34 MG/DL (ref 6–23)
CALCIUM SERPL-MCNC: 9.5 MG/DL (ref 8.3–10.6)
CHLORIDE BLD-SCNC: 100 MMOL/L (ref 99–110)
CO2: 22 MMOL/L (ref 21–32)
CREAT SERPL-MCNC: 1 MG/DL (ref 0.9–1.3)
CULTURE: NORMAL
GFR AFRICAN AMERICAN: >60 ML/MIN/1.73M2
GFR NON-AFRICAN AMERICAN: >60 ML/MIN/1.73M2
GLUCOSE BLD-MCNC: 115 MG/DL (ref 70–99)
HCT VFR BLD CALC: 34.9 % (ref 42–52)
HEMOGLOBIN: 11.6 GM/DL (ref 13.5–18)
Lab: NORMAL
MCH RBC QN AUTO: 32.9 PG (ref 27–31)
MCHC RBC AUTO-ENTMCNC: 33.2 % (ref 32–36)
MCV RBC AUTO: 98.9 FL (ref 78–100)
PDW BLD-RTO: 13.1 % (ref 11.7–14.9)
PLATELET # BLD: 182 K/CU MM (ref 140–440)
PMV BLD AUTO: 9.8 FL (ref 7.5–11.1)
POTASSIUM SERPL-SCNC: 4 MMOL/L (ref 3.5–5.1)
RBC # BLD: 3.53 M/CU MM (ref 4.6–6.2)
REASON FOR REJECTION: NORMAL
REJECTED TEST: NORMAL
SODIUM BLD-SCNC: 135 MMOL/L (ref 135–145)
SPECIMEN: NORMAL
TOTAL PROTEIN: 7.1 GM/DL (ref 6.4–8.2)
WBC # BLD: 4.8 K/CU MM (ref 4–10.5)

## 2020-09-14 PROCEDURE — 97116 GAIT TRAINING THERAPY: CPT

## 2020-09-14 PROCEDURE — 80053 COMPREHEN METABOLIC PANEL: CPT

## 2020-09-14 PROCEDURE — 2580000003 HC RX 258: Performed by: STUDENT IN AN ORGANIZED HEALTH CARE EDUCATION/TRAINING PROGRAM

## 2020-09-14 PROCEDURE — 6370000000 HC RX 637 (ALT 250 FOR IP): Performed by: INTERNAL MEDICINE

## 2020-09-14 PROCEDURE — 70551 MRI BRAIN STEM W/O DYE: CPT

## 2020-09-14 PROCEDURE — 94640 AIRWAY INHALATION TREATMENT: CPT

## 2020-09-14 PROCEDURE — 94761 N-INVAS EAR/PLS OXIMETRY MLT: CPT

## 2020-09-14 PROCEDURE — 97535 SELF CARE MNGMENT TRAINING: CPT

## 2020-09-14 PROCEDURE — 6370000000 HC RX 637 (ALT 250 FOR IP): Performed by: STUDENT IN AN ORGANIZED HEALTH CARE EDUCATION/TRAINING PROGRAM

## 2020-09-14 PROCEDURE — 6360000002 HC RX W HCPCS: Performed by: STUDENT IN AN ORGANIZED HEALTH CARE EDUCATION/TRAINING PROGRAM

## 2020-09-14 PROCEDURE — 85027 COMPLETE CBC AUTOMATED: CPT

## 2020-09-14 PROCEDURE — 97161 PT EVAL LOW COMPLEX 20 MIN: CPT

## 2020-09-14 PROCEDURE — 36415 COLL VENOUS BLD VENIPUNCTURE: CPT

## 2020-09-14 PROCEDURE — 97165 OT EVAL LOW COMPLEX 30 MIN: CPT

## 2020-09-14 RX ORDER — CELECOXIB 200 MG/1
200 CAPSULE ORAL DAILY
Qty: 60 CAPSULE | Refills: 3 | Status: ON HOLD | OUTPATIENT
Start: 2020-09-15 | End: 2020-10-30 | Stop reason: HOSPADM

## 2020-09-14 RX ORDER — AMLODIPINE BESYLATE 10 MG/1
10 TABLET ORAL DAILY
Qty: 30 TABLET | Refills: 3 | Status: ON HOLD | OUTPATIENT
Start: 2020-09-15 | End: 2020-10-30 | Stop reason: HOSPADM

## 2020-09-14 RX ADMIN — FINASTERIDE 5 MG: 5 TABLET, FILM COATED ORAL at 10:10

## 2020-09-14 RX ADMIN — PANTOPRAZOLE SODIUM 20 MG: 20 TABLET, DELAYED RELEASE ORAL at 04:33

## 2020-09-14 RX ADMIN — SODIUM CHLORIDE, PRESERVATIVE FREE 10 ML: 5 INJECTION INTRAVENOUS at 10:08

## 2020-09-14 RX ADMIN — PREDNISONE 5 MG: 5 TABLET ORAL at 10:10

## 2020-09-14 RX ADMIN — BUDESONIDE AND FORMOTEROL FUMARATE DIHYDRATE 2 PUFF: 160; 4.5 AEROSOL RESPIRATORY (INHALATION) at 08:00

## 2020-09-14 RX ADMIN — CELECOXIB 200 MG: 100 CAPSULE ORAL at 10:09

## 2020-09-14 RX ADMIN — ENOXAPARIN SODIUM 40 MG: 40 INJECTION SUBCUTANEOUS at 10:08

## 2020-09-14 RX ADMIN — AMLODIPINE BESYLATE 10 MG: 10 TABLET ORAL at 10:08

## 2020-09-14 RX ADMIN — Medication 15 G: at 10:07

## 2020-09-14 RX ADMIN — THIAMINE HYDROCHLORIDE 100 MG: 100 INJECTION, SOLUTION INTRAMUSCULAR; INTRAVENOUS at 11:28

## 2020-09-14 RX ADMIN — ALLOPURINOL 100 MG: 100 TABLET ORAL at 10:08

## 2020-09-14 ASSESSMENT — PAIN SCALES - GENERAL
PAINLEVEL_OUTOF10: 0

## 2020-09-14 NOTE — CARE COORDINATION
Spoke with nursing during morning huddle. Pt is still ambulating well in room on his own, continues to present with no discharge planning needs. Reviewed therapy rec'd of Radha Villavicencio however pt has declined.

## 2020-09-14 NOTE — PLAN OF CARE
Problem: Falls - Risk of:  Goal: Will remain free from falls  Description: Will remain free from falls  Outcome: Completed  Goal: Absence of physical injury  Description: Absence of physical injury  Outcome: Completed

## 2020-09-14 NOTE — PROGRESS NOTES
Occupational Therapy    [facility] ACUTE CARE OCCUPATIONAL THERAPY EVALUATION  Patrick Bernstein, 1940, 4103/4103-A, 9/14/2020    History  Tejon:  The primary encounter diagnosis was Hyponatremia. Diagnoses of Lightheadedness, Hypersomnolence, and Lung nodule were also pertinent to this visit. Patient  has a past medical history of Acid reflux, Arthritis, Benign prostatic hyperplasia, BPH (benign prostatic hyperplasia), COPD (chronic obstructive pulmonary disease) ([address] Dizziness, DVT (deep venous thrombosis) ([address] Enlarged prostate, Essential hypertension, Glaucoma, Gout, [place] (hard of hearing), Hx of blood clots, Hypertension, Impaired glucose tolerance, Pneumonia, Slow urinary stream, Syncope, Teeth missing, and Wears glasses. Patient  has a past surgical history that includes eye surgery (Right, 1990's); eye surgery (Left, 2016); Dental surgery; Colonoscopy (2000's); Inguinal hernia repair (Right, 2000); Vasectomy (1984); Abdominal exploration surgery (1984); Inguinal hernia repair (Right, 02/28/2018); and Appendectomy. Subjective:  Patient states:  \"I'm feeling better than I have had for weeks'. Pain:  Denies. Communication with other providers:  Handoff to RN, co-eval with PT.    Restrictions: General Precautions, Fall Risk    Home Setup/Prior level of function  Social/Functional History  Lives With: Spouse  Type of Home: (condo)  Home Layout: One level  Home Access: Stairs to enter without rails  Entrance Stairs - Number of Steps: 1  Bathroom Shower/Tub: Walk-in shower  Bathroom Toilet: Standard  Bathroom Equipment: Grab bars in shower  Bathroom Accessibility: Accessible  Home Equipment: (no AD)  ADL Assistance: Independent  Homemaking Assistance: Independent  Homemaking Responsibilities: Yes  Ambulation Assistance: Independent  Transfer Assistance: Independent  Active : Yes  Mode of Transportation: Car  Occupation: Retired  Additional Comments: Pt reports no recent falls    Examination of body systems (includes body structures/functions, activity/participation limitations):  · Observation:  Supine in bed upon arrival, agreeable to therapy  · Vision:  Glasses  · Hearing:  [facility]  · Cardiopulmonary:  No 02 needs      Body Systems and functions:  · ROM R/L:  WFL. · Strength R/L:  ***/5,   · Sensation: WFL  · Tone: Normal  · Coordination: WFL  · Perception: WNL    Activities of Daily Living (ADLs):  · Feeding: Quynh  · Grooming:  · UB bathing:  · LB bathing:  · UB dressing:  · LB dressing:  · Toileting:    Cognitive and Psychosocial Functioning:  · Overall cognitive status: WNL  · Affect: Normal, Pleasant, Motivated       Mobility:  · Supine to sit:  ***  · Transfers: ***  · Sitting balance:  ***. · Standing balance:  ***. · Toilet/Shower Transfers: ***                  Treatment:  ***  Safety: patient left in chair with chair alarm, call light within reach, RN notified, gait belt used. Assessment:  ***Pt is a *** admitted from *** for ***. Pt at baseline is *** for ADLs *** for high level IADLs and *** for functional transfers/mobility w/ ***. Pt currently presents w/ deficits in ADL and high level IADL independence, functional activity tolerance, dynamic sitting and standing balance and tolerance and functional transfers. Pt would benefit from continued acute care OT services w/ discharge to ***  Complexity: Moderate  Prognosis: Good, no significant barriers to participation at this time.  ***        Equipment: defer***    Goals:  Pt goal: go home  Time Frame for STGs: discharge  Goal 1: Pt will perform UE ADLs ***  Goal 2: Pt will perform LE ADLs  Goal 3: Pt will perform toileting  Goal 4: Pt will perform functional transfer w/ AD  Goal 5: Pt will perform functional mobility w/ AD  Goal 6: Pt will perform therex/theract in order to increase functional activity tolerance and dynamic standing balance    Treatment plan:  ***Pt will perform functional task in stand reaching in all 3 planes in order to increase dynamic standing balance and functional activity tolerance    Recommendations for NURSING activity: ***Up to chair for all 3 meals and up to *** for all toileting needs    Time:   Time in: ***  Time out: ***  Timed treatment minutes: ***  Total time: ***    Electronically signed by:    Dayne Ramirez OT  9/14/2020, 10:59 AM

## 2020-09-14 NOTE — PROGRESS NOTES
Hospitalist Progress Note      Name:  Josr Villagomez /Age/Sex: 1940  (79 y.o. male)   MRN & CSN:  [id number] & [id number] Admission Date/Time: 2020  8:00 PM   Location:  [address] [place] [address] PCP: Ofelia Clay MD         Hospital Day: 3    Assessment and Plan:   Josr Villagomez is a 79 y.o.  male  who presents with Hyponatremia     1) Hyponatremia, chronic, likely multifactorial  -Still very concerning for SIADH as urine indices are indicative of such  -Na improved  -Nephrology on board     2) 2.1x1.2 cm nodule on CT chest  -Pulmonology on board  -Plan is for OP PET and biopsy  -MRI Brain ordered due to persistent dizziness     3) EtOH abuse  -CIWA-A  -Counseled to quit     Other chronic medical conditions:  -Essential Hypertension: Continue Amlodipine  -COPD: Continue home meds  -GERD: Continue home meds  -RA: Continue home meds    Diet DIET GENERAL; Daily Fluid Restriction: 1200 ml   DVT Prophylaxis [] Lovenox, []  Heparin, [] SCDs, [] Ambulation   GI Prophylaxis [] PPI,  [] H2 Blocker,  [] Carafate,  [] Diet/Tube Feeds   Code Status Full Code   Disposition Home   MDM      History of Present Illness:     Patient was seen and examined  Denied any worsening dizziness  No SOB, palpitations  No fever, chills, N/V/D       Ten point ROS reviewed negative, unless as noted above    Objective: Intake/Output Summary (Last 24 hours) at 2020 1204  Last data filed at 2020 1034  Gross per 24 hour   Intake 480 ml   Output 300 ml   Net 180 ml      Vitals:   Vitals:    20 0817   BP: (!) 166/86   Pulse: 70   Resp: 18   Temp: 96.4 °F (35.8 °C)   SpO2: 92%     Physical Exam:   GEN Awake male, sitting upright in bed in no apparent distress. Appears given age. EYES Pupils are equally round. No scleral erythema, discharge, or conjunctivitis. HENT Mucous membranes are moist. Oral pharynx without exudates, no evidence of thrush. NECK Supple, no apparent thyromegaly or masses.   RESP Clear to auscultation, no wheezes, rales or rhonchi. Symmetric chest movement while on room air. CARDIO/VASC S1/S2 auscultated. Regular rate without appreciable murmurs, rubs, or gallops. No JVD or carotid bruits. Peripheral pulses equal bilaterally and palpable. No peripheral edema. GI Abdomen is soft without significant tenderness, masses, or guarding. Bowel sounds are normoactive. Rectal exam deferred.  No costovertebral angle tenderness. Normal appearing external genitalia. Castillo catheter is not present. HEME/LYMPH No palpable cervical lymphadenopathy and no hepatosplenomegaly. No petechiae or ecchymoses. MSK No gross joint deformities. SKIN Normal coloration, warm, dry. NEURO Cranial nerves appear grossly intact, normal speech, no lateralizing weakness. PSYCH Awake, alert, oriented x 4. Affect appropriate.     Medications:   Medications:    sodium chloride flush  10 mL Intravenous 2 times per day    enoxaparin  40 mg Subcutaneous Daily    thiamine (VITAMIN B1) IVPB  100 mg Intravenous Daily    budesonide-formoterol  2 puff Inhalation BID    finasteride  5 mg Oral Daily    allopurinol  100 mg Oral Daily    pantoprazole  20 mg Oral QAM AC    predniSONE  5 mg Oral Daily    amLODIPine  10 mg Oral Daily    urea  15 g Oral BID    celecoxib  200 mg Oral Daily      Infusions:   PRN Meds: sodium chloride flush, 10 mL, PRN  acetaminophen, 650 mg, Q6H PRN    Or  acetaminophen, 650 mg, Q6H PRN  polyethylene glycol, 17 g, Daily PRN  promethazine, 12.5 mg, Q6H PRN    Or  ondansetron, 4 mg, Q6H PRN  LORazepam, 1 mg, Q1H PRN    Or  LORazepam, 1 mg, Q1H PRN    Or  LORazepam, 2 mg, Q1H PRN    Or  LORazepam, 2 mg, Q1H PRN    Or  LORazepam, 3 mg, Q1H PRN    Or  LORazepam, 3 mg, Q1H PRN    Or  LORazepam, 4 mg, Q1H PRN    Or  LORazepam, 4 mg, Q1H PRN          Electronically signed by Bridger Rodriguez MD on 9/14/2020 at 12:04 PM

## 2020-09-14 NOTE — PROGRESS NOTES
Physical Therapy  [facility] ACUTE CARE PHYSICAL THERAPY EVALUATION  Daryn Isidro, 1940, 4103/4103-A, 9/14/2020    History  [place]:  The primary encounter diagnosis was Hyponatremia. Diagnoses of Lightheadedness, Hypersomnolence, and Lung nodule were also pertinent to this visit. Patient  has a past medical history of Acid reflux, Arthritis, Benign prostatic hyperplasia, BPH (benign prostatic hyperplasia), COPD (chronic obstructive pulmonary disease) ([address] Dizziness, DVT (deep venous thrombosis) ([address] Enlarged prostate, Essential hypertension, Glaucoma, Gout, [place] (hard of hearing), Hx of blood clots, Hypertension, Impaired glucose tolerance, Pneumonia, Slow urinary stream, Syncope, Teeth missing, and Wears glasses. Patient  has a past surgical history that includes eye surgery (Right, 1990's); eye surgery (Left, 2016); Dental surgery; Colonoscopy (2000's); Inguinal hernia repair (Right, 2000); Vasectomy (1984); Abdominal exploration surgery (1984); Inguinal hernia repair (Right, 02/28/2018); and Appendectomy. Subjective:  Patient states:  \"I feel the best I've felt in the last few days! \"  Pain:  Denies   Communication with other providers:  RN, co-eval with Shayna RONQUILLO   Restrictions: general precautions, chair alarm    Home Setup/Prior level of function  Social/Functional History  Lives With: Spouse  Type of Home: (condo)  Home Layout: One level  Home Access: Stairs to enter without rails  Entrance Stairs - Number of Steps: 1  Bathroom Shower/Tub: Walk-in shower  Bathroom Toilet: Standard  Bathroom Equipment: Grab bars in shower  Bathroom Accessibility: Accessible  Home Equipment: (no AD)  ADL Assistance: Independent  Homemaking Assistance: Independent  Homemaking Responsibilities: Yes  Ambulation Assistance: Independent  Transfer Assistance: Independent  Active : Yes  Mode of Transportation: Car  Occupation: Retired  Additional Comments: Pt reports no recent falls    Examination ADLs. He performed well this date though slightly below his typical baseline. He would benefit from continued therapy to address his currnet deficits, dec potential fall risk,and restore function. Recommend frequent mobility with nursing while admitted to prevent deconditioning. Complexity: Low   Prognosis: Good, no significant barriers to participation at this time.    Plan Times per week: 2+/week  Discharge Recommendations: 24 hour supervision or assist, Home with Home health PT, S Level 1  Equipment: no needs (pt reports being able to borrow his neighbors walker for a little if he needs it)    Treatment plan:  Recommendations for NURSING mobility: ambulate in the hallway 2-3x per day if time permits with gait belt     Time:   Time in: 1057  Time out: 1117  Timed treatment minutes: 8  Total time: 20    Electronically signed by:    Gricelda Jacobsen [id number]  9/14/2020, 12:34 PM

## 2020-09-14 NOTE — PROGRESS NOTES
Reviewed discharge paperwork with patient and his spouse. Both deny questions or needs. IV removed intact with no signs of bleeding. Personal belongings returned to patient. Patient taken to main entrance via wheelchair for discharge to home via family vehicle.

## 2020-09-14 NOTE — DISCHARGE INSTR - DIET

## 2020-09-14 NOTE — DISCHARGE SUMMARY
Discharge Summary    Name:  Inna Stauffer /Age/Sex: 1940  (79 y.o. male)   MRN & CSN:  [id number] & [id number] Admission Date/Time: 2020  8:00 PM   Attending:  Sue Carmona MD Discharging Physician: Tatiana Knight MD     Hospital Course:   Inna Stauffer is a 79 y.o.  male  who presents with Hyponatremia    Assessment and Plan   1) Hyponatremia, chronic, likely multifactorial  -Still very concerning for SIADH as urine indices are indicative of such  -Na improved  -Nephrology on board     2) 2.1x1.2 cm nodule on CT chest  -Pulmonology on board  -Plan is for OP PET and biopsy  -MRI Brain: No acute pathology     3) EtOH abuse  -CIWA-A  -Counseled to quit     Other chronic medical conditions:  -Essential Hypertension: Continue Amlodipine  -COPD: Continue home meds  -GERD: Continue home meds  -RA: Continue home meds    The patient expressed appropriate understanding of and agreement with the discharge recommendations, medications, and plan.      Consults this admission:  IP CONSULT TO CASE MANAGEMENT  IP CONSULT TO HOSPITALIST  IP CONSULT TO NEPHROLOGY    Discharge Instruction:   Follow up appointments: Pulmo and Nephro in 2 weeks  Primary care physician:  within 2 weeks    Diet:  cardiac diet   Activity: activity as tolerated  Disposition: Discharged to:   []Home, []C, []SNF, []Acute Rehab, []Hospice   Condition on discharge: Stable    Discharge Medications:      Molly Hodges   Home Medication Instructions [id number]    Printed on:20 6083   Medication Information                      ADVAIR DISKUS 250-50 MCG/DOSE AEPB  Inhale 1 puff into the lungs 2 times daily             alfuzosin (UROXATRAL) 10 MG extended release tablet  Take 10 mg by mouth daily             allopurinol (ZYLOPRIM) 100 MG tablet  Take 1 tablet by mouth daily             amLODIPine (NORVASC) 10 MG tablet  Take 1 tablet by mouth daily             celecoxib (CELEBREX) 200 MG capsule  Take 1 capsule by mouth daily             finasteride (PROSCAR) 5 MG tablet  Take 1 tablet by mouth daily             guaiFENesin (MUCINEX) 600 MG extended release tablet  Take 2 tablets by mouth every 12 hours for 14 days Drink plenty of water. lansoprazole (PREVACID) 30 MG delayed release capsule  Take 30 mg by mouth daily             predniSONE (DELTASONE) 5 MG tablet  Take 5 mg by mouth daily              vitamin B-1 100 MG tablet  Take 1 tablet by mouth daily             zoster recombinant adjuvanted vaccine (SHINGRIX) 50 MCG/0.5ML SUSR injection  50 MCG IM then repeat 2-6 months. Objective Findings at Discharge:   BP (!) 159/82   Pulse 63   Temp 98.2 °F (36.8 °C) (Oral)   Resp 18   Ht 6' 1\" (1.854 m)   Wt 175 lb 9.6 oz (79.7 kg)   SpO2 97%   BMI 23.17 kg/m²            PHYSICAL EXAM   GEN Awake male, sitting upright in bed in no apparent distress. Appears given age. EYES Pupils are equally round. No scleral erythema, discharge, or conjunctivitis. HENT Mucous membranes are moist. Oral pharynx without exudates, no evidence of thrush. NECK Supple, no apparent thyromegaly or masses. RESP Clear to auscultation, no wheezes, rales or rhonchi. Symmetric chest movement while on room air. CARDIO/VASC S1/S2 auscultated. Regular rate without appreciable murmurs, rubs, or gallops. No JVD or carotid bruits. Peripheral pulses equal bilaterally and palpable. No peripheral edema. GI Abdomen is soft without significant tenderness, masses, or guarding. Bowel sounds are normoactive. Rectal exam deferred.  No costovertebral angle tenderness. Normal appearing external genitalia. Castillo catheter is not present. HEME/LYMPH No palpable cervical lymphadenopathy and no hepatosplenomegaly. No petechiae or ecchymoses. MSK No gross joint deformities. SKIN Normal coloration, warm, dry. NEURO Cranial nerves appear grossly intact, normal speech, no lateralizing weakness. PSYCH Awake, alert, oriented x 4. Affect appropriate.     BMP/CBC  Recent Labs     09/12/20  1947 09/13/20  1512 09/14/20  0432 09/14/20  0541   * 132*  --  135   K 4.0 4.3  --  4.0   CL 91* 99  --  100   CO2 19* 21  --  22   BUN 22 31*  --  34*   CREATININE 1.0 1.2  --  1.0   WBC 5.5  --  4.8  --    HCT 34.9*  --  34.9*  --      --  182  --        IMAGING:  As above    Discharge Time of 35 minutes    Electronically signed by Charlie Burnham MD on 9/14/2020 at 4:59 PM

## 2020-09-14 NOTE — PROGRESS NOTES
systems (includes body structures/functions, activity/participation limitations):  · Observation:  Supine in bed upon arrival, agreeable to therapy, wife in room  · Vision:  Glasses  · Hearing:  Bilateral Hearing Aides  · Cardiopulmonary:  No 02 needs      Body Systems and functions:  · ROM R/L:  WFL. · Strength R/L:  5/5,   · Sensation: WFL  · Tone: Normal  · Coordination: WFL  · Perception: WNL    Activities of Daily Living (ADLs):  · Feeding: [place]yn[place] (dentures)  · Grooming: Independent (washing face w/ warm washcloth, oral care in stand at sink)  · UB bathing: Independent  · LB bathing: SBA  · UB dressing: Independent  · LB dressing: SBA  · Toileting: SBA    Cognitive and Psychosocial Functioning:  · Overall cognitive status: WNL  · Affect: Normal        Mobility:  · Supine to sit: Independent  · Transfers: SBA from EOB up to stand  · Sitting balance:  Independent. · Standing balance:  SBA  · Functional Mobility: CGA w/o RW, SBA w/ RW (See PT notes for gait details). · Toilet/Shower Transfers: DNT             AM-[facility] Daily Activity Inpatient   How much help for putting on and taking off regular lower body clothing?: A Little  How much help for Bathing?: A Little  How much help for Toileting?: A Little  How much help for putting on and taking off regular upper body clothing?: None  How much help for taking care of personal grooming?: None  How much help for eating meals?: None  AM-[facility] Inpatient Daily Activity Raw Score: 21  AM-[facility] Inpatient ADL T-Scale Score : 44.27  ADL Inpatient CMS 0-100% Score: 32.79  ADL Inpatient CMS G-Code Modifier : CJ    Treatment:  Self Care Training:   Cues were given for safety, sequence, UE/LE placement, visual cues, and balance. Activities performed today included grooming    Safety: patient left in chair with chair alarm, call light within reach, RN notified, gait belt used. Assessment:  Pt is a 78 yo male admitted from home for hyponatremia.  Pt at baseline is Independent

## 2020-09-15 ENCOUNTER — CARE COORDINATION (OUTPATIENT)
Dept: CASE MANAGEMENT | Age: 80
End: 2020-09-15

## 2020-09-15 LAB
EKG ATRIAL RATE: 68 BPM
EKG DIAGNOSIS: NORMAL
EKG P AXIS: 37 DEGREES
EKG P-R INTERVAL: 202 MS
EKG Q-T INTERVAL: 440 MS
EKG QRS DURATION: 152 MS
EKG QTC CALCULATION (BAZETT): 467 MS
EKG R AXIS: 49 DEGREES
EKG T AXIS: -9 DEGREES
EKG VENTRICULAR RATE: 68 BPM

## 2020-09-16 LAB
EKG ATRIAL RATE: 78 BPM
EKG DIAGNOSIS: NORMAL
EKG P AXIS: 38 DEGREES
EKG P-R INTERVAL: 186 MS
EKG Q-T INTERVAL: 408 MS
EKG QRS DURATION: 150 MS
EKG QTC CALCULATION (BAZETT): 465 MS
EKG R AXIS: 69 DEGREES
EKG T AXIS: -11 DEGREES
EKG VENTRICULAR RATE: 78 BPM

## 2020-09-17 ENCOUNTER — OFFICE VISIT (OUTPATIENT)
Dept: FAMILY MEDICINE CLINIC | Age: 80
End: 2020-09-17
Payer: MEDICARE

## 2020-09-17 VITALS
WEIGHT: 181.8 LBS | HEART RATE: 73 BPM | DIASTOLIC BLOOD PRESSURE: 60 MMHG | TEMPERATURE: 97.9 F | HEIGHT: 72 IN | BODY MASS INDEX: 24.62 KG/M2 | OXYGEN SATURATION: 96 % | SYSTOLIC BLOOD PRESSURE: 90 MMHG

## 2020-09-17 PROCEDURE — G8427 DOCREV CUR MEDS BY ELIG CLIN: HCPCS | Performed by: FAMILY MEDICINE

## 2020-09-17 PROCEDURE — 99214 OFFICE O/P EST MOD 30 MIN: CPT | Performed by: FAMILY MEDICINE

## 2020-09-17 PROCEDURE — 1111F DSCHRG MED/CURRENT MED MERGE: CPT | Performed by: FAMILY MEDICINE

## 2020-09-17 PROCEDURE — G8420 CALC BMI NORM PARAMETERS: HCPCS | Performed by: FAMILY MEDICINE

## 2020-09-17 PROCEDURE — 1123F ACP DISCUSS/DSCN MKR DOCD: CPT | Performed by: FAMILY MEDICINE

## 2020-09-17 PROCEDURE — 4040F PNEUMOC VAC/ADMIN/RCVD: CPT | Performed by: FAMILY MEDICINE

## 2020-09-17 PROCEDURE — 1036F TOBACCO NON-USER: CPT | Performed by: FAMILY MEDICINE

## 2020-09-17 ASSESSMENT — ENCOUNTER SYMPTOMS
RHINORRHEA: 1
COUGH: 1
SHORTNESS OF BREATH: 0

## 2020-09-17 NOTE — PROGRESS NOTES
tablet 1    ADVAIR DISKUS 250-50 MCG/DOSE AEPB Inhale 1 puff into the lungs 2 times daily 3 Inhaler 1    lansoprazole (PREVACID) 30 MG delayed release capsule Take 30 mg by mouth daily      alfuzosin (UROXATRAL) 10 MG extended release tablet Take 10 mg by mouth daily      predniSONE (DELTASONE) 5 MG tablet Take 5 mg by mouth daily        No current facility-administered medications for this visit. ALLERGIES  No Known Allergies    PHYSICAL EXAM    BP 90/60 (Site: Left Upper Arm, Position: Sitting, Cuff Size: Medium Adult)   Pulse 73   Temp 97.9 °F (36.6 °C) (Temporal)   Ht 6' (1.829 m)   Wt 181 lb 12.8 oz (82.5 kg)   SpO2 96%   BMI 24.66 kg/m²     Physical Exam  Vitals signs and nursing note reviewed. Exam conducted with a chaperone present. Constitutional:       Appearance: Normal appearance. HENT:      Head: Normocephalic. Right Ear: External ear normal.      Left Ear: External ear normal.   Eyes:      Conjunctiva/sclera: Conjunctivae normal.   Cardiovascular:      Rate and Rhythm: Normal rate. Pulmonary:      Effort: Pulmonary effort is normal. No respiratory distress. Neurological:      General: No focal deficit present. Mental Status: He is alert. Mental status is at baseline. Psychiatric:         Mood and Affect: Mood normal.         Thought Content: Thought content normal.            ASSESSMENT and Trev Umm was seen today for follow-up from hospital and medication problem. Diagnoses and all orders for this visit:    Lung nodule  -     PET CT FULL BODY; Future    Hyponatremia    Essential hypertension    Blood pressures running low  The patient was advised to get a home blood pressure cuff and report if it below 90 in particular he has symptoms  I am ordering a PET scan  Keep appointments with nephrology and pulmonary in the next week  Follow-up here in 2 weeks    Return in about 2 weeks (around 10/1/2020).           Electronically signed by Candis Watkins MD on

## 2020-09-18 LAB — ARGININE VASOPRESSIN PLASMA: <0.5 PG/ML (ref 0–6.9)

## 2020-09-21 ENCOUNTER — TELEPHONE (OUTPATIENT)
Dept: FAMILY MEDICINE CLINIC | Age: 80
End: 2020-09-21

## 2020-10-01 ENCOUNTER — OFFICE VISIT (OUTPATIENT)
Dept: FAMILY MEDICINE CLINIC | Age: 80
End: 2020-10-01
Payer: MEDICARE

## 2020-10-01 VITALS
SYSTOLIC BLOOD PRESSURE: 115 MMHG | TEMPERATURE: 98.1 F | BODY MASS INDEX: 24.92 KG/M2 | WEIGHT: 184 LBS | OXYGEN SATURATION: 96 % | HEIGHT: 72 IN | DIASTOLIC BLOOD PRESSURE: 62 MMHG | HEART RATE: 78 BPM

## 2020-10-01 PROCEDURE — G8484 FLU IMMUNIZE NO ADMIN: HCPCS | Performed by: FAMILY MEDICINE

## 2020-10-01 PROCEDURE — G8420 CALC BMI NORM PARAMETERS: HCPCS | Performed by: FAMILY MEDICINE

## 2020-10-01 PROCEDURE — G8926 SPIRO NO PERF OR DOC: HCPCS | Performed by: FAMILY MEDICINE

## 2020-10-01 PROCEDURE — G8427 DOCREV CUR MEDS BY ELIG CLIN: HCPCS | Performed by: FAMILY MEDICINE

## 2020-10-01 PROCEDURE — 1036F TOBACCO NON-USER: CPT | Performed by: FAMILY MEDICINE

## 2020-10-01 PROCEDURE — 99214 OFFICE O/P EST MOD 30 MIN: CPT | Performed by: FAMILY MEDICINE

## 2020-10-01 PROCEDURE — 1111F DSCHRG MED/CURRENT MED MERGE: CPT | Performed by: FAMILY MEDICINE

## 2020-10-01 PROCEDURE — 4040F PNEUMOC VAC/ADMIN/RCVD: CPT | Performed by: FAMILY MEDICINE

## 2020-10-01 PROCEDURE — 3023F SPIROM DOC REV: CPT | Performed by: FAMILY MEDICINE

## 2020-10-01 PROCEDURE — 1123F ACP DISCUSS/DSCN MKR DOCD: CPT | Performed by: FAMILY MEDICINE

## 2020-10-01 RX ORDER — TAMSULOSIN HYDROCHLORIDE 0.4 MG/1
0.4 CAPSULE ORAL DAILY
Qty: 90 CAPSULE | Refills: 1 | Status: ON HOLD
Start: 2020-10-01 | End: 2020-10-30

## 2020-10-01 ASSESSMENT — ENCOUNTER SYMPTOMS
COUGH: 0
SORE THROAT: 0
SHORTNESS OF BREATH: 0

## 2020-10-01 NOTE — PROGRESS NOTES
on it I wrote him prescription for that today   Musculoskeletal:        He has significant arthritis possibly gout involving his wrist  He been on 400 mg of Celebrex this was lowered to 200 he said the symptoms are active again was going to discuss going back to the higher dose when he sees the nephrologist  He may as well have rheumatoid arthritis  He is on Plaquenil therapy   Neurological: Negative for dizziness, seizures and syncope. Psychiatric/Behavioral: Negative.         PAST MEDICAL HISTORY  Past Medical History:   Diagnosis Date    Acid reflux     Arthritis     \"Wrists\"    Benign prostatic hyperplasia     without outflow obstruction    BPH (benign prostatic hyperplasia)     [facility] obstructive [facility] disease) [facility])     Dizziness     DVT (deep venous thrombosis) ([facility])     Right Leg In Late 1980's, Right Leg In 2002    Enlarged prostate     Essential hypertension     Glaucoma     Bilateral Eyes    Gout     [place] (hard of hearing)     Bilateral Hearing Aids    Hx of blood clots Dx Late 1980's And 2002    \"DVT Right Leg\"    Hypertension     Impaired glucose tolerance     Pneumonia 5/15/2018    Slow urinary stream     Syncope     with loss of consciousness    Teeth missing     Upper And Lower    Wears glasses        FAMILY HISTORY  Family History   Problem Relation Age of Onset    Dementia Mother     Diabetes type 2  Father        SOCIAL HISTORY  Social History     Socioeconomic History    Marital status:      Spouse name: None    Number of children: None    Years of education: None    Highest education level: None   Occupational History    None   Social Needs    Financial resource strain: None    Food insecurity     Worry: None     Inability: None    Transportation needs     Medical: None     Non-medical: None   Tobacco Use    Smoking status: Former Smoker     Packs/day: 1.00     Years: 29.00     Pack years: 29.00     Types: Cigarettes, Pipe     Start date: 1955 Last attempt to quit: 1984     Years since quittin.7    Smokeless tobacco: Never Used   Substance and Sexual Activity    Alcohol use:  Yes     Alcohol/week: 2.0 - 3.0 standard drinks     Types: 2 - 3 Glasses of wine per week     Comment: \"2 Glasses Of Wine Daily\"    Drug use: No    Sexual activity: Not Currently   Lifestyle    Physical activity     Days per week: None     Minutes per session: None    Stress: None   Relationships    Social connections     Talks on phone: None     Gets together: None     Attends [employer] service: None     Active member of club or organization: None     Attends meetings of clubs or organizations: None     Relationship status: None    Intimate partner violence     Fear of current or ex partner: None     Emotionally abused: None     Physically abused: None     Forced sexual activity: None   Other Topics Concern    None   Social History Narrative    None        SURGICAL HISTORY  Past Surgical History:   Procedure Laterality Date    ABDOMINAL EXPLORATION SURGERY      \"Intestines Were Tangled, Had Salmonella Poisoning\"    APPENDECTOMY      COLONOSCOPY      DENTAL SURGERY      Teeth Extracted In Past    EYE SURGERY Right     Cataract With Lens Implant    EYE SURGERY Left 2016    Cataract With Lens Implant    INGUINAL HERNIA REPAIR Right     With Mesh    INGUINAL HERNIA REPAIR Right 2018    With mesh and plug    VASECTOMY  1984       CURRENT MEDICATIONS  Current Outpatient Medications   Medication Sig Dispense Refill    tamsulosin (FLOMAX) 0.4 MG capsule Take 1 capsule by mouth daily 90 capsule 1    demeclocycline (DECLOMYCIN) 150 MG tablet Take 1 tablet by mouth 2 times daily for 14 days 30 tablet 3    azelastine (ASTELIN) 0.1 % nasal spray 1 spray by Nasal route 2 times daily Use in each nostril as directed      lisinopril (PRINIVIL;ZESTRIL) 10 MG tablet Take 10 mg by mouth daily      amLODIPine (NORVASC) 10 MG tablet Take 1 tablet by mouth daily (Patient taking differently: Take 5 mg by mouth daily ) 30 tablet 3    celecoxib (CELEBREX) 200 MG capsule Take 1 capsule by mouth daily (Patient taking differently: Take 400 mg by mouth daily ) 60 capsule 3    vitamin B-1 100 MG tablet Take 1 tablet by mouth daily 30 tablet 3    finasteride (PROSCAR) 5 MG tablet Take 1 tablet by mouth daily 90 tablet 1    allopurinol (ZYLOPRIM) 100 MG tablet Take 1 tablet by mouth daily 90 tablet 1    ADVAIR DISKUS 250-50 MCG/DOSE AEPB Inhale 1 puff into the lungs 2 times daily 3 Inhaler 1    lansoprazole (PREVACID) 30 MG delayed release capsule Take 30 mg by mouth daily      predniSONE (DELTASONE) 5 MG tablet Take 5 mg by mouth daily       hydroxychloroquine (PLAQUENIL) 200 MG tablet Take 200 mg by mouth daily      zoster recombinant adjuvanted vaccine (SHINGRIX) 50 MCG/0.5ML SUSR injection 50 MCG IM then repeat 2-6 months. (Patient not taking: Reported on 10/1/2020) 0.5 mL 1     No current facility-administered medications for this visit. ALLERGIES  No Known Allergies    PHYSICAL EXAM    /62   Pulse 78   Temp 98.1 °F (36.7 °C)   Ht 6' (1.829 m)   Wt 184 lb (83.5 kg)   SpO2 96%   BMI 24.95 kg/m²     Physical Exam  Vitals signs and nursing note reviewed. Constitutional:       General: He is not in acute distress. Appearance: Normal appearance. HENT:      Head: Normocephalic. Right Ear: External ear normal.      Left Ear: External ear normal.   Eyes:      Conjunctiva/sclera: Conjunctivae normal.   Cardiovascular:      Rate and Rhythm: Normal rate. Pulmonary:      Effort: Pulmonary effort is normal. No respiratory distress. Neurological:      General: No focal deficit present. Mental Status: He is alert. Mental status is at baseline. Psychiatric:         Mood and Affect: Mood normal.         Thought Content:  Thought content normal.     Reviewed his PET scan  Reviewed his most recent labs  Reconciled his medications  Reviewed his hospital discharge summary  Reviewed consultant reports        ASSESSMENT and Winston Giordano was seen today for 2 week follow-up. Diagnoses and all orders for this visit:    Chronic obstructive pulmonary disease, unspecified COPD type ([address]    Benign prostatic hyperplasia without lower urinary tract symptoms    Hyponatremia    Essential hypertension    Primary osteoarthritis of right wrist    Other orders  -     tamsulosin (FLOMAX) 0.4 MG capsule; Take 1 capsule by mouth daily      I think we can observe the lung nodule for now just get a CAT scan in 3 months  I do not think a biopsy is indicated  He is to continue the fluid restriction and medication from nephrology  Continue same meds for hypertension  Restart tamsulosin 0.4 mg daily  Follow-up in 6 weeks  No follow-ups on file. Electronically signed by Roberto Carlos Burgos MD on 10/1/2020    Please note that this chart was generated using dragon dictation software. Although every effort was made to ensure the accuracy of this automated transcription, some errors in transcription may have occurred.

## 2020-10-16 ENCOUNTER — OFFICE VISIT (OUTPATIENT)
Dept: FAMILY MEDICINE CLINIC | Age: 80
End: 2020-10-16
Payer: MEDICARE

## 2020-10-16 VITALS
HEART RATE: 70 BPM | WEIGHT: 184.8 LBS | DIASTOLIC BLOOD PRESSURE: 80 MMHG | SYSTOLIC BLOOD PRESSURE: 116 MMHG | BODY MASS INDEX: 25.03 KG/M2 | OXYGEN SATURATION: 96 % | TEMPERATURE: 97 F | HEIGHT: 72 IN

## 2020-10-16 PROCEDURE — 1036F TOBACCO NON-USER: CPT | Performed by: FAMILY MEDICINE

## 2020-10-16 PROCEDURE — 3023F SPIROM DOC REV: CPT | Performed by: FAMILY MEDICINE

## 2020-10-16 PROCEDURE — G8427 DOCREV CUR MEDS BY ELIG CLIN: HCPCS | Performed by: FAMILY MEDICINE

## 2020-10-16 PROCEDURE — 99214 OFFICE O/P EST MOD 30 MIN: CPT | Performed by: FAMILY MEDICINE

## 2020-10-16 PROCEDURE — 4040F PNEUMOC VAC/ADMIN/RCVD: CPT | Performed by: FAMILY MEDICINE

## 2020-10-16 PROCEDURE — G8926 SPIRO NO PERF OR DOC: HCPCS | Performed by: FAMILY MEDICINE

## 2020-10-16 PROCEDURE — G8417 CALC BMI ABV UP PARAM F/U: HCPCS | Performed by: FAMILY MEDICINE

## 2020-10-16 PROCEDURE — 1123F ACP DISCUSS/DSCN MKR DOCD: CPT | Performed by: FAMILY MEDICINE

## 2020-10-16 PROCEDURE — G8484 FLU IMMUNIZE NO ADMIN: HCPCS | Performed by: FAMILY MEDICINE

## 2020-10-16 RX ORDER — CEFADROXIL 500 MG/1
500 CAPSULE ORAL 2 TIMES DAILY
COMMUNITY
Start: 2020-02-17 | End: 2020-10-16 | Stop reason: ALTCHOICE

## 2020-10-16 RX ORDER — DEMECLOCYCLINE HYDROCHLORIDE 150 MG/1
150 TABLET, FILM COATED ORAL 2 TIMES DAILY
COMMUNITY
End: 2020-11-03

## 2020-10-16 ASSESSMENT — ENCOUNTER SYMPTOMS
SHORTNESS OF BREATH: 0
COUGH: 1

## 2020-10-16 NOTE — PROGRESS NOTES
10/16/2020     Mounika Oneal      Chief Complaint   Patient presents with    Dizziness     Gets dizzy when standing from a sitting position.  Shortness of Breath     Patient complains that he is still having issues with dizziness & SOB. HPI      Amado Stanton is a 79 y.o. male who presents today with the followin. Essential hypertension    2. Chronic obstructive pulmonary disease, unspecified COPD type ([address]    3. Hyponatremia    4. Arthritis    5. Benign prostatic hyperplasia without lower urinary tract symptoms    He continues to have lightheadedness  This is orthostatic I change some of his medicine but continues to have the symptoms  He has been on Flomax for couple years because he has significant BPH but has not had urinary retention he is on Proscar as well    REVIEW OF SYMPTOMS    Review of Systems   Constitutional: Negative for activity change, fever and unexpected weight change. Respiratory: Positive for cough. Negative for shortness of breath. Has a chronic nagging cough  Recent had a complete pulmonary work-up  He has what appears to be a benign lung nodule  He went to ENT and they tried him on nasal spray as I thought was postnasal drip   Genitourinary: Positive for difficulty urinating and frequency. Musculoskeletal:        Patient has an inflammatory arthritis he is thought to have had gout but he was also on Plaquenil for a possible inflammatory arthritis  He had stopped the Plaquenil and I told him to call check with rheumatology about whether he should stay on it   Neurological: Positive for dizziness and light-headedness. Hematological:        Patient was admitted to the hospital within the past 6 months with life-threatening hyponatremia  Exact etiology could be found  Has been corrected he is on Declomycin from nephrology  Last sodium was 136 within the past month   Psychiatric/Behavioral: Negative.         PAST MEDICAL HISTORY  Past Medical History:   Diagnosis Date    None     Active member of club or organization: None     Attends meetings of clubs or organizations: None     Relationship status: None    Intimate partner violence     Fear of current or ex partner: None     Emotionally abused: None     Physically abused: None     Forced sexual activity: None   Other Topics Concern    None   Social History Narrative    None        SURGICAL HISTORY  Past Surgical History:   Procedure Laterality Date    ABDOMINAL EXPLORATION SURGERY  1984    \"Intestines Were Tangled, Had Salmonella Poisoning\"    APPENDECTOMY      COLONOSCOPY  2000's    DENTAL SURGERY      Teeth Extracted In Past    EYE SURGERY Right 1990's    Cataract With Lens Implant    EYE SURGERY Left 2016    Cataract With Lens Implant    INGUINAL HERNIA REPAIR Right 2000    With Mesh    INGUINAL HERNIA REPAIR Right 02/28/2018    With mesh and plug    VASECTOMY  1984       CURRENT MEDICATIONS  Current Outpatient Medications   Medication Sig Dispense Refill    demeclocycline (DECLOMYCIN) 150 MG tablet Take 150 mg by mouth 2 times daily      tamsulosin (FLOMAX) 0.4 MG capsule Take 1 capsule by mouth daily 90 capsule 1    azelastine (ASTELIN) 0.1 % nasal spray 1 spray by Nasal route 2 times daily Use in each nostril as directed      hydroxychloroquine (PLAQUENIL) 200 MG tablet Take 200 mg by mouth daily      lisinopril (PRINIVIL;ZESTRIL) 10 MG tablet Take 10 mg by mouth daily      amLODIPine (NORVASC) 10 MG tablet Take 1 tablet by mouth daily (Patient taking differently: Take 5 mg by mouth daily ) 30 tablet 3    celecoxib (CELEBREX) 200 MG capsule Take 1 capsule by mouth daily (Patient taking differently: Take 400 mg by mouth daily ) 60 capsule 3    vitamin B-1 100 MG tablet Take 1 tablet by mouth daily 30 tablet 3    zoster recombinant adjuvanted vaccine (SHINGRIX) 50 MCG/0.5ML SUSR injection 50 MCG IM then repeat 2-6 months.  0.5 mL 1    finasteride (PROSCAR) 5 MG tablet Take 1 tablet by mouth daily 90 tablet follow-up here in 6 weeks  Let me know over the phone in a couple weeks what happens with the Flomax      No follow-ups on file. Electronically signed by Danny Clemente MD on 10/16/2020    Please note that this chart was generated using dragon dictation software. Although every effort was made to ensure the accuracy of this automated transcription, some errors in transcription may have occurred.

## 2020-10-28 ENCOUNTER — TELEPHONE (OUTPATIENT)
Dept: FAMILY MEDICINE CLINIC | Age: 80
End: 2020-10-28

## 2020-10-29 ENCOUNTER — APPOINTMENT (OUTPATIENT)
Dept: GENERAL RADIOLOGY | Age: 80
DRG: 166 | End: 2020-10-29
Payer: MEDICARE

## 2020-10-29 ENCOUNTER — APPOINTMENT (OUTPATIENT)
Dept: CT IMAGING | Age: 80
DRG: 166 | End: 2020-10-29
Payer: MEDICARE

## 2020-10-29 ENCOUNTER — TELEPHONE (OUTPATIENT)
Dept: FAMILY MEDICINE CLINIC | Age: 80
End: 2020-10-29

## 2020-10-29 ENCOUNTER — HOSPITAL ENCOUNTER (INPATIENT)
Age: 80
LOS: 1 days | Discharge: HOME | DRG: 166 | End: 2020-10-30
Attending: EMERGENCY MEDICINE | Admitting: INTERNAL MEDICINE
Payer: MEDICARE

## 2020-10-29 ENCOUNTER — APPOINTMENT (OUTPATIENT)
Dept: ULTRASOUND IMAGING | Age: 80
DRG: 166 | End: 2020-10-29
Payer: MEDICARE

## 2020-10-29 PROBLEM — I26.09 ACUTE PULMONARY EMBOLISM WITH ACUTE COR PULMONALE (HCC): Status: ACTIVE | Noted: 2020-10-29

## 2020-10-29 PROBLEM — I26.99 PULMONARY EMBOLISM, BILATERAL (HCC): Status: ACTIVE | Noted: 2020-10-29

## 2020-10-29 LAB
ABO/RH: NORMAL
ALBUMIN SERPL-MCNC: 3.1 GM/DL (ref 3.4–5)
ALP BLD-CCNC: 54 IU/L (ref 40–129)
ALT SERPL-CCNC: 10 U/L (ref 10–40)
ANION GAP SERPL CALCULATED.3IONS-SCNC: 14 MMOL/L (ref 4–16)
ANTIBODY SCREEN: NEGATIVE
APTT: 154.7 SECONDS (ref 25.1–37.1)
APTT: 30.5 SECONDS (ref 25.1–37.1)
AST SERPL-CCNC: 15 IU/L (ref 15–37)
BASOPHILS ABSOLUTE: 0.1 K/CU MM
BASOPHILS RELATIVE PERCENT: 0.6 % (ref 0–1)
BILIRUB SERPL-MCNC: 0.3 MG/DL (ref 0–1)
BUN BLDV-MCNC: 18 MG/DL (ref 6–23)
CALCIUM SERPL-MCNC: 8.7 MG/DL (ref 8.3–10.6)
CHLORIDE BLD-SCNC: 98 MMOL/L (ref 99–110)
CO2: 20 MMOL/L (ref 21–32)
CREAT SERPL-MCNC: 1.1 MG/DL (ref 0.9–1.3)
DIFFERENTIAL TYPE: ABNORMAL
EOSINOPHILS ABSOLUTE: 0.4 K/CU MM
EOSINOPHILS RELATIVE PERCENT: 4.9 % (ref 0–3)
FIBRINOGEN LEVEL: 290 MG/DL (ref 196.9–442.1)
GFR AFRICAN AMERICAN: >60 ML/MIN/1.73M2
GFR NON-AFRICAN AMERICAN: >60 ML/MIN/1.73M2
GLUCOSE BLD-MCNC: 91 MG/DL (ref 70–99)
HCT VFR BLD CALC: 36.8 % (ref 42–52)
HEMOGLOBIN: 12.6 GM/DL (ref 13.5–18)
IMMATURE NEUTROPHIL %: 0.9 % (ref 0–0.43)
LV EF: 53 %
LVEF MODALITY: NORMAL
LYMPHOCYTES ABSOLUTE: 1.8 K/CU MM
LYMPHOCYTES RELATIVE PERCENT: 20.6 % (ref 24–44)
MCH RBC QN AUTO: 34 PG (ref 27–31)
MCHC RBC AUTO-ENTMCNC: 34.2 % (ref 32–36)
MCV RBC AUTO: 99.2 FL (ref 78–100)
MONOCYTES ABSOLUTE: 0.6 K/CU MM
MONOCYTES RELATIVE PERCENT: 6.8 % (ref 0–4)
NUCLEATED RBC %: 0 %
PDW BLD-RTO: 13.7 % (ref 11.7–14.9)
PLATELET # BLD: 175 K/CU MM (ref 140–440)
PMV BLD AUTO: 10.5 FL (ref 7.5–11.1)
POTASSIUM SERPL-SCNC: 4.6 MMOL/L (ref 3.5–5.1)
PRO-BNP: ABNORMAL PG/ML
RBC # BLD: 3.71 M/CU MM (ref 4.6–6.2)
SARS-COV-2, NAAT: NOT DETECTED
SEGMENTED NEUTROPHILS ABSOLUTE COUNT: 5.8 K/CU MM
SEGMENTED NEUTROPHILS RELATIVE PERCENT: 66.2 % (ref 36–66)
SODIUM BLD-SCNC: 132 MMOL/L (ref 135–145)
SOURCE: NORMAL
TOTAL IMMATURE NEUTOROPHIL: 0.08 K/CU MM
TOTAL NUCLEATED RBC: 0 K/CU MM
TOTAL PROTEIN: 7 GM/DL (ref 6.4–8.2)
TROPONIN T: 0.02 NG/ML
WBC # BLD: 8.8 K/CU MM (ref 4–10.5)

## 2020-10-29 PROCEDURE — 75743 ARTERY X-RAYS LUNGS: CPT

## 2020-10-29 PROCEDURE — 94761 N-INVAS EAR/PLS OXIMETRY MLT: CPT

## 2020-10-29 PROCEDURE — 36014 PLACE CATHETER IN ARTERY: CPT

## 2020-10-29 PROCEDURE — C1894 INTRO/SHEATH, NON-LASER: HCPCS

## 2020-10-29 PROCEDURE — 94640 AIRWAY INHALATION TREATMENT: CPT

## 2020-10-29 PROCEDURE — 6360000002 HC RX W HCPCS: Performed by: INTERNAL MEDICINE

## 2020-10-29 PROCEDURE — 37211 THROMBOLYTIC ART THERAPY: CPT | Performed by: INTERNAL MEDICINE

## 2020-10-29 PROCEDURE — 2580000003 HC RX 258: Performed by: EMERGENCY MEDICINE

## 2020-10-29 PROCEDURE — 6360000004 HC RX CONTRAST MEDICATION

## 2020-10-29 PROCEDURE — 6370000000 HC RX 637 (ALT 250 FOR IP): Performed by: INTERNAL MEDICINE

## 2020-10-29 PROCEDURE — C1769 GUIDE WIRE: HCPCS

## 2020-10-29 PROCEDURE — 2000000000 HC ICU R&B

## 2020-10-29 PROCEDURE — 36592 COLLECT BLOOD FROM PICC: CPT

## 2020-10-29 PROCEDURE — 86850 RBC ANTIBODY SCREEN: CPT

## 2020-10-29 PROCEDURE — 2500000003 HC RX 250 WO HCPCS

## 2020-10-29 PROCEDURE — 99285 EMERGENCY DEPT VISIT HI MDM: CPT

## 2020-10-29 PROCEDURE — 71045 X-RAY EXAM CHEST 1 VIEW: CPT

## 2020-10-29 PROCEDURE — 02FQ3Z0 FRAGMENTATION OF RIGHT PULMONARY ARTERY, PERCUTANEOUS APPROACH, ULTRASONIC: ICD-10-PCS | Performed by: INTERNAL MEDICINE

## 2020-10-29 PROCEDURE — 37211 THROMBOLYTIC ART THERAPY: CPT

## 2020-10-29 PROCEDURE — 02FR3Z0 FRAGMENTATION OF LEFT PULMONARY ARTERY, PERCUTANEOUS APPROACH, ULTRASONIC: ICD-10-PCS | Performed by: INTERNAL MEDICINE

## 2020-10-29 PROCEDURE — 71275 CT ANGIOGRAPHY CHEST: CPT

## 2020-10-29 PROCEDURE — 93005 ELECTROCARDIOGRAM TRACING: CPT | Performed by: EMERGENCY MEDICINE

## 2020-10-29 PROCEDURE — 2700000000 HC OXYGEN THERAPY PER DAY

## 2020-10-29 PROCEDURE — 83880 ASSAY OF NATRIURETIC PEPTIDE: CPT

## 2020-10-29 PROCEDURE — 86900 BLOOD TYPING SEROLOGIC ABO: CPT

## 2020-10-29 PROCEDURE — 80053 COMPREHEN METABOLIC PANEL: CPT

## 2020-10-29 PROCEDURE — 85730 THROMBOPLASTIN TIME PARTIAL: CPT

## 2020-10-29 PROCEDURE — 6360000004 HC RX CONTRAST MEDICATION: Performed by: EMERGENCY MEDICINE

## 2020-10-29 PROCEDURE — 86901 BLOOD TYPING SEROLOGIC RH(D): CPT

## 2020-10-29 PROCEDURE — 2709999900 HC NON-CHARGEABLE SUPPLY

## 2020-10-29 PROCEDURE — 6360000002 HC RX W HCPCS: Performed by: EMERGENCY MEDICINE

## 2020-10-29 PROCEDURE — 6360000002 HC RX W HCPCS

## 2020-10-29 PROCEDURE — 93010 ELECTROCARDIOGRAM REPORT: CPT | Performed by: INTERNAL MEDICINE

## 2020-10-29 PROCEDURE — U0002 COVID-19 LAB TEST NON-CDC: HCPCS

## 2020-10-29 PROCEDURE — 36014 PLACE CATHETER IN ARTERY: CPT | Performed by: INTERNAL MEDICINE

## 2020-10-29 PROCEDURE — 93306 TTE W/DOPPLER COMPLETE: CPT

## 2020-10-29 PROCEDURE — 2580000003 HC RX 258: Performed by: INTERNAL MEDICINE

## 2020-10-29 PROCEDURE — B31T1ZZ FLUOROSCOPY OF LEFT PULMONARY ARTERY USING LOW OSMOLAR CONTRAST: ICD-10-PCS | Performed by: INTERNAL MEDICINE

## 2020-10-29 PROCEDURE — 84484 ASSAY OF TROPONIN QUANT: CPT

## 2020-10-29 PROCEDURE — 85384 FIBRINOGEN ACTIVITY: CPT

## 2020-10-29 PROCEDURE — 75746 ARTERY X-RAYS LUNG: CPT | Performed by: INTERNAL MEDICINE

## 2020-10-29 PROCEDURE — 3E06317 INTRODUCTION OF OTHER THROMBOLYTIC INTO CENTRAL ARTERY, PERCUTANEOUS APPROACH: ICD-10-PCS | Performed by: INTERNAL MEDICINE

## 2020-10-29 PROCEDURE — C1751 CATH, INF, PER/CENT/MIDLINE: HCPCS

## 2020-10-29 PROCEDURE — 93970 EXTREMITY STUDY: CPT

## 2020-10-29 PROCEDURE — 4A023N6 MEASUREMENT OF CARDIAC SAMPLING AND PRESSURE, RIGHT HEART, PERCUTANEOUS APPROACH: ICD-10-PCS | Performed by: INTERNAL MEDICINE

## 2020-10-29 PROCEDURE — 96372 THER/PROPH/DIAG INJ SC/IM: CPT

## 2020-10-29 PROCEDURE — 85025 COMPLETE CBC W/AUTO DIFF WBC: CPT

## 2020-10-29 PROCEDURE — 99222 1ST HOSP IP/OBS MODERATE 55: CPT | Performed by: INTERNAL MEDICINE

## 2020-10-29 PROCEDURE — 76937 US GUIDE VASCULAR ACCESS: CPT | Performed by: INTERNAL MEDICINE

## 2020-10-29 RX ORDER — SODIUM CHLORIDE 0.9 % (FLUSH) 0.9 %
10 SYRINGE (ML) INJECTION PRN
Status: DISCONTINUED | OUTPATIENT
Start: 2020-10-29 | End: 2020-10-30 | Stop reason: HOSPADM

## 2020-10-29 RX ORDER — ALLOPURINOL 100 MG/1
100 TABLET ORAL DAILY
Status: DISCONTINUED | OUTPATIENT
Start: 2020-10-29 | End: 2020-10-30 | Stop reason: HOSPADM

## 2020-10-29 RX ORDER — HYDROXYCHLOROQUINE SULFATE 200 MG/1
200 TABLET, FILM COATED ORAL DAILY
Status: DISCONTINUED | OUTPATIENT
Start: 2020-10-29 | End: 2020-10-30 | Stop reason: HOSPADM

## 2020-10-29 RX ORDER — TAMSULOSIN HYDROCHLORIDE 0.4 MG/1
0.4 CAPSULE ORAL DAILY
Status: DISCONTINUED | OUTPATIENT
Start: 2020-10-29 | End: 2020-10-30 | Stop reason: HOSPADM

## 2020-10-29 RX ORDER — HEPARIN SODIUM 10000 [USP'U]/100ML
250 INJECTION, SOLUTION INTRAVENOUS CONTINUOUS
Status: ACTIVE | OUTPATIENT
Start: 2020-10-29 | End: 2020-10-29

## 2020-10-29 RX ORDER — AZELASTINE 1 MG/ML
1 SPRAY, METERED NASAL 2 TIMES DAILY
Status: DISCONTINUED | OUTPATIENT
Start: 2020-10-29 | End: 2020-10-30 | Stop reason: HOSPADM

## 2020-10-29 RX ORDER — HEPARIN SODIUM 1000 [USP'U]/ML
80 INJECTION, SOLUTION INTRAVENOUS; SUBCUTANEOUS PRN
Status: DISCONTINUED | OUTPATIENT
Start: 2020-10-29 | End: 2020-10-29

## 2020-10-29 RX ORDER — PREDNISONE 1 MG/1
5 TABLET ORAL DAILY
Status: DISCONTINUED | OUTPATIENT
Start: 2020-10-29 | End: 2020-10-30 | Stop reason: HOSPADM

## 2020-10-29 RX ORDER — GUAIFENESIN/DEXTROMETHORPHAN 100-10MG/5
5 SYRUP ORAL EVERY 4 HOURS PRN
Status: DISCONTINUED | OUTPATIENT
Start: 2020-10-29 | End: 2020-10-30 | Stop reason: HOSPADM

## 2020-10-29 RX ORDER — LISINOPRIL 5 MG/1
10 TABLET ORAL DAILY
Status: DISCONTINUED | OUTPATIENT
Start: 2020-10-29 | End: 2020-10-30 | Stop reason: HOSPADM

## 2020-10-29 RX ORDER — HYDROCODONE BITARTRATE AND ACETAMINOPHEN 5; 325 MG/1; MG/1
1 TABLET ORAL EVERY 6 HOURS PRN
Status: DISCONTINUED | OUTPATIENT
Start: 2020-10-29 | End: 2020-10-30 | Stop reason: HOSPADM

## 2020-10-29 RX ORDER — BUDESONIDE AND FORMOTEROL FUMARATE DIHYDRATE 160; 4.5 UG/1; UG/1
2 AEROSOL RESPIRATORY (INHALATION) 2 TIMES DAILY
Status: DISCONTINUED | OUTPATIENT
Start: 2020-10-29 | End: 2020-10-30 | Stop reason: HOSPADM

## 2020-10-29 RX ORDER — ALBUTEROL SULFATE 90 UG/1
2 AEROSOL, METERED RESPIRATORY (INHALATION)
Status: DISCONTINUED | OUTPATIENT
Start: 2020-10-29 | End: 2020-10-30 | Stop reason: HOSPADM

## 2020-10-29 RX ORDER — PROMETHAZINE HYDROCHLORIDE 12.5 MG/1
12.5 TABLET ORAL EVERY 6 HOURS PRN
Status: DISCONTINUED | OUTPATIENT
Start: 2020-10-29 | End: 2020-10-30 | Stop reason: HOSPADM

## 2020-10-29 RX ORDER — FINASTERIDE 5 MG/1
5 TABLET, FILM COATED ORAL DAILY
Status: DISCONTINUED | OUTPATIENT
Start: 2020-10-29 | End: 2020-10-30 | Stop reason: HOSPADM

## 2020-10-29 RX ORDER — HEPARIN SODIUM 10000 [USP'U]/100ML
250 INJECTION, SOLUTION INTRAVENOUS EVERY 6 HOURS
Status: COMPLETED | OUTPATIENT
Start: 2020-10-29 | End: 2020-10-29

## 2020-10-29 RX ORDER — HEPARIN SODIUM 1000 [USP'U]/ML
40 INJECTION, SOLUTION INTRAVENOUS; SUBCUTANEOUS PRN
Status: DISCONTINUED | OUTPATIENT
Start: 2020-10-29 | End: 2020-10-29

## 2020-10-29 RX ORDER — MORPHINE SULFATE 2 MG/ML
2 INJECTION, SOLUTION INTRAMUSCULAR; INTRAVENOUS EVERY 4 HOURS PRN
Status: DISCONTINUED | OUTPATIENT
Start: 2020-10-29 | End: 2020-10-30 | Stop reason: HOSPADM

## 2020-10-29 RX ORDER — SODIUM CHLORIDE 0.9 % (FLUSH) 0.9 %
10 SYRINGE (ML) INJECTION EVERY 12 HOURS SCHEDULED
Status: DISCONTINUED | OUTPATIENT
Start: 2020-10-29 | End: 2020-10-30 | Stop reason: HOSPADM

## 2020-10-29 RX ORDER — DEMECLOCYCLINE HYDROCHLORIDE 150 MG/1
150 TABLET, FILM COATED ORAL 2 TIMES DAILY
Status: CANCELLED | OUTPATIENT
Start: 2020-10-29

## 2020-10-29 RX ORDER — ALBUTEROL SULFATE 90 UG/1
2 AEROSOL, METERED RESPIRATORY (INHALATION) EVERY 6 HOURS PRN
Status: DISCONTINUED | OUTPATIENT
Start: 2020-10-29 | End: 2020-10-29

## 2020-10-29 RX ORDER — HEPARIN SODIUM 1000 [USP'U]/ML
80 INJECTION, SOLUTION INTRAVENOUS; SUBCUTANEOUS ONCE
Status: DISCONTINUED | OUTPATIENT
Start: 2020-10-29 | End: 2020-10-29

## 2020-10-29 RX ORDER — HEPARIN SODIUM 10000 [USP'U]/100ML
18 INJECTION, SOLUTION INTRAVENOUS CONTINUOUS
Status: DISCONTINUED | OUTPATIENT
Start: 2020-10-29 | End: 2020-10-29

## 2020-10-29 RX ORDER — ONDANSETRON 2 MG/ML
4 INJECTION INTRAMUSCULAR; INTRAVENOUS EVERY 6 HOURS PRN
Status: DISCONTINUED | OUTPATIENT
Start: 2020-10-29 | End: 2020-10-29 | Stop reason: ALTCHOICE

## 2020-10-29 RX ORDER — PANTOPRAZOLE SODIUM 40 MG/1
40 TABLET, DELAYED RELEASE ORAL
Status: DISCONTINUED | OUTPATIENT
Start: 2020-10-30 | End: 2020-10-30 | Stop reason: HOSPADM

## 2020-10-29 RX ORDER — ACETAMINOPHEN 650 MG/1
650 SUPPOSITORY RECTAL EVERY 6 HOURS PRN
Status: DISCONTINUED | OUTPATIENT
Start: 2020-10-29 | End: 2020-10-30 | Stop reason: HOSPADM

## 2020-10-29 RX ORDER — ACETAMINOPHEN 325 MG/1
650 TABLET ORAL EVERY 6 HOURS PRN
Status: DISCONTINUED | OUTPATIENT
Start: 2020-10-29 | End: 2020-10-30 | Stop reason: HOSPADM

## 2020-10-29 RX ORDER — IPRATROPIUM BROMIDE AND ALBUTEROL SULFATE 2.5; .5 MG/3ML; MG/3ML
1 SOLUTION RESPIRATORY (INHALATION)
Status: DISCONTINUED | OUTPATIENT
Start: 2020-10-29 | End: 2020-10-29

## 2020-10-29 RX ORDER — AMLODIPINE BESYLATE 5 MG/1
5 TABLET ORAL DAILY
Status: DISCONTINUED | OUTPATIENT
Start: 2020-10-29 | End: 2020-10-30 | Stop reason: HOSPADM

## 2020-10-29 RX ORDER — POLYETHYLENE GLYCOL 3350 17 G/17G
17 POWDER, FOR SOLUTION ORAL DAILY PRN
Status: DISCONTINUED | OUTPATIENT
Start: 2020-10-29 | End: 2020-10-30 | Stop reason: HOSPADM

## 2020-10-29 RX ORDER — THIAMINE MONONITRATE (VIT B1) 100 MG
100 TABLET ORAL DAILY
Status: DISCONTINUED | OUTPATIENT
Start: 2020-10-29 | End: 2020-10-30 | Stop reason: HOSPADM

## 2020-10-29 RX ADMIN — ENOXAPARIN SODIUM 80 MG: 80 INJECTION SUBCUTANEOUS at 08:53

## 2020-10-29 RX ADMIN — LISINOPRIL 10 MG: 5 TABLET ORAL at 13:41

## 2020-10-29 RX ADMIN — HEPARIN SODIUM AND DEXTROSE 250 UNITS/HR: 10000; 5 INJECTION INTRAVENOUS at 13:08

## 2020-10-29 RX ADMIN — ENOXAPARIN SODIUM 80 MG: 80 INJECTION SUBCUTANEOUS at 20:52

## 2020-10-29 RX ADMIN — SODIUM CHLORIDE, PRESERVATIVE FREE 10 ML: 5 INJECTION INTRAVENOUS at 08:17

## 2020-10-29 RX ADMIN — ALBUTEROL SULFATE 2 PUFF: 90 AEROSOL, METERED RESPIRATORY (INHALATION) at 20:46

## 2020-10-29 RX ADMIN — ALTEPLASE: 2.2 INJECTION, POWDER, LYOPHILIZED, FOR SOLUTION INTRAVENOUS at 13:08

## 2020-10-29 RX ADMIN — HYDROXYCHLOROQUINE SULFATE 200 MG: 200 TABLET ORAL at 13:41

## 2020-10-29 RX ADMIN — IOPAMIDOL 80 ML: 755 INJECTION, SOLUTION INTRAVENOUS at 08:17

## 2020-10-29 RX ADMIN — Medication 100 MG: at 13:41

## 2020-10-29 RX ADMIN — ALTEPLASE: 2.2 INJECTION, POWDER, LYOPHILIZED, FOR SOLUTION INTRAVENOUS at 13:07

## 2020-10-29 RX ADMIN — BUDESONIDE AND FORMOTEROL FUMARATE DIHYDRATE 2 PUFF: 160; 4.5 AEROSOL RESPIRATORY (INHALATION) at 20:47

## 2020-10-29 RX ADMIN — GLYCOPYRROLATE AND FORMOTEROL FUMARATE 2 PUFF: 9; 4.8 AEROSOL, METERED RESPIRATORY (INHALATION) at 20:47

## 2020-10-29 RX ADMIN — GUAIFENESIN AND DEXTROMETHORPHAN 5 ML: 100; 10 SYRUP ORAL at 15:16

## 2020-10-29 RX ADMIN — FINASTERIDE 5 MG: 5 TABLET, FILM COATED ORAL at 13:41

## 2020-10-29 RX ADMIN — PREDNISONE 5 MG: 5 TABLET ORAL at 13:41

## 2020-10-29 RX ADMIN — AMLODIPINE BESYLATE 5 MG: 5 TABLET ORAL at 13:41

## 2020-10-29 ASSESSMENT — PAIN SCALES - GENERAL: PAINLEVEL_OUTOF10: 0

## 2020-10-29 NOTE — H&P
History and Physical      Name:  Nataliia Ochoa /Age/Sex: 1940  (79 y.o. male)   MRN & CSN:  [id number] & [id number] Admission Date/Time: 10/29/2020  6:23 AM   Location:  [facility] PCP: Ibeth Kaur MD       Hospital Day: 1        Admitting Physician: Dr. Sudha Li and Plan:   Nataliia Ochoa is a 79 y.o. male who presents with Shortness of Breath     Acute pulmonary embolism with acute cor pulmonale. Echo shows evidence of LH strain. Admit to ICU   Lovenox 1mg/kg given in ed   Cardiology consulted   Pulmonology consulted   Plan for thrombolytic therapy   NPO   Lower extremity Doppler pending    Troponin trend/telemetry      Rheumatoid arthritis-on immunosuppressive therapy/chronic prednisone     COPD- continue PRN and routine breathing treatments     Essential hypertension- continue home antihypertensive regimen- Monitor BP trends. Patient case discussed with ED provider    Diet    DVT Prophylaxis [x] Lovenox, []  Heparin, [] SCDs, [] Ambulation  [] Long term AC   GI Prophylaxis [x] PPI,  [] H2 Blocker,  [] Carafate,  [] Diet/Tube Feeds   Code Status Full     Disposition Admit to inpatient. Patient plans to return home upon discharge   MDM [] Low, [] Moderate,[x]  High     -Patient assessment and plan discussed and reviewed with admitting physician: Himanshu Gonzalez MD.     History of Present Illness:     Chief Complaint: Shortness of Breath    Nataliia Ochoa is a 79 y.o. male who presents with shortness of breath. Reports onset over the past approximately 3-4 days. Worse with exertion. Associated lightheadedness which he was recently evaluated by his PCP for. Reports chronic cough that is slightly worsening. Denies sick contacts, fever symptoms, chest pain, abdominal pain, or changes in bowel/bladder habits. While in the emergency room he was found to have extensive PE with right heart strain. He was positive for DVT.   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 Objective:   No intake or output data in the 24 hours ending 10/29/20 1040     Vitals:   Vitals:    10/29/20 1017 10/29/20 1023 10/29/20 1027 10/29/20 1037   BP: (!) 149/82      Pulse: 86 87 87 87   Resp:       Temp:       TempSrc:       SpO2:  97%     Weight:       Height:           Physical Exam: 10/29/20     GEN -Awake nontoxic appearing male, sitting upright in bed , NAD. obese body habitus. Appears given age. EYES -PERRLA. No scleral erythema, discharge, or conjunctivitis. HENT -MM are moist. Oral pharynx without exudates, no evidence of thrush. NECK -Supple, no apparent thyromegaly or masses. RESP -CTA, no wheezes, rales or rhonchi. Symmetric chest movement while on RA.   C/V -S1/S2 auscultated. RRR +murmur s1/2 split. No JVD or carotid bruits. Peripheral pulses equal bilaterally and palpable. Cap refill <3 sec. No peripheral edema. GI -Abdomen is soft non distended and without significant TTP. + BS. No masses or guarding. Rectal exam deferred. No HSM   -No CVA/ flank tenderness. Castillo catheter is not present. LYMPH-No palpable cervical lymphadenopathy and no hepatosplenomegaly. No petechiae or ecchymoses. MS -No gross joint deformities. SKIN -Normal coloration, warm, dry. NEURO-Cranial nerves appear grossly intact, normal speech, no lateralizing weakness. PSYC-Awake, alert, oriented x 4- person, place, time, situation,  Appropriate affect.     Past Medical History:      Past Medical History:   Diagnosis Date    Acid reflux     Arthritis     \"Wrists\"    Benign prostatic hyperplasia     without outflow obstruction    BPH (benign prostatic hyperplasia)     COPD (chronic obstructive pulmonary disease) (HCC)     Dizziness     DVT (deep venous thrombosis) (HCC)     Right Leg In Late 1980's, Right Leg In 2002    Enlarged prostate     Essential hypertension     Glaucoma     Bilateral Eyes    Gout     [place] (hard of hearing)     Bilateral Hearing Aids    Hx of blood clots Dx Late  And     \"DVT Right Leg\"    Hypertension     Impaired glucose tolerance     Pneumonia 5/15/2018    Slow urinary stream     Syncope     with loss of consciousness    Teeth missing     Upper And Lower    Wears glasses        Past Surgical  History:    has a past surgical history that includes eye surgery (Right, ); eye surgery (Left, ); Dental surgery; Colonoscopy (); Inguinal hernia repair (Right, ); Vasectomy (); Abdominal exploration surgery (); Inguinal hernia repair (Right, 2018); and Appendectomy. Social History:    FAM HX: Reviewed  family history includes Dementia in his mother; Diabetes type 2  in his father. Soc HX:   Social History     Socioeconomic History    Marital status:      Spouse name: Not on file    Number of children: Not on file    Years of education: Not on file    Highest education level: Not on file   Occupational History    Not on file   Social Needs    Financial resource strain: Not on file    Food insecurity     Worry: Not on file     Inability: Not on file    Transportation needs     Medical: Not on file     Non-medical: Not on file   Tobacco Use    Smoking status: Former Smoker     Packs/day: 1.00     Years: 29.00     Pack years: 29.00     Types: Cigarettes, Pipe     Start date:      Last attempt to quit:      Years since quittin.8    Smokeless tobacco: Never Used   Substance and Sexual Activity    Alcohol use:  Yes     Alcohol/week: 2.0 - 3.0 standard drinks     Types: 2 - 3 Glasses of wine per week     Comment: \"2 Glasses Of Wine Daily\"    Drug use: No    Sexual activity: Not Currently   Lifestyle    Physical activity     Days per week: Not on file     Minutes per session: Not on file    Stress: Not on file   Relationships    Social connections     Talks on phone: Not on file MD   zoster recombinant adjuvanted vaccine (SHINGRIX) 50 MCG/0.5ML SUSR injection 50 MCG IM then repeat 2-6 months. 8/10/20 8/10/21  Martell Matias MD   finasteride (PROSCAR) 5 MG tablet Take 1 tablet by mouth daily 8/10/20   Martell Matias MD   allopurinol (ZYLOPRIM) 100 MG tablet Take 1 tablet by mouth daily 8/10/20   Martell Matias MD   ADVAIR DISKUS 250-50 MCG/DOSE AEPB Inhale 1 puff into the lungs 2 times daily 8/10/20 11/8/20  Martell Matias MD   lansoprazole (PREVACID) 30 MG delayed release capsule Take 30 mg by mouth daily    Historical Provider, MD   predniSONE (DELTASONE) 5 MG tablet Take 5 mg by mouth daily  2/17/17   Historical Provider, MD         Medications:   Medications:    enoxaparin  1 mg/kg Subcutaneous BID    glycopyrrolate-formoterol  2 puff Inhalation BID      Infusions:   PRN Meds: sodium chloride flush, 10 mL, PRN        Data:     Laboratory this visit:  Reviewed  Recent Labs     10/29/20  0640   WBC 8.8   HGB 12.6*   HCT 36.8*         Recent Labs     10/29/20  0640   *   K 4.6   CL 98*   CO2 20*   BUN 18   CREATININE 1.1     Recent Labs     10/29/20  0640   AST 15   ALT 10   BILITOT 0.3   ALKPHOS 54     No results for input(s): INR in the last 72 hours. Radiology this visit:  Reviewed. Xr Chest Portable    Result Date: 10/29/2020  EXAMINATION: ONE XRAY VIEW OF THE CHEST 10/29/2020 6:41 am COMPARISON: 09/12/2020 HISTORY: Acute shortness of breath. FINDINGS: Heart size at the upper limit of normal.  Pulmonary vasculature is normal. Lingular opacity is unchanged. Unchanged nodule in the superior segment of the right lower lobe as seen on recent chest CT no acute airspace disease, pleural effusion, or pneumothorax. Unchanged lingular opacity which may represent atelectasis. Unchanged nodule in the superior segment of the right lower lobe as seen on recent chest CT. No acute abnormality seen.      Cta Pulmonary W Contrast    Result Date: 10/29/2020  EXAMINATION: CTA OF THE CHEST 10/29/2020 8:16 am TECHNIQUE: CTA of the chest was performed after the administration of intravenous contrast.  Multiplanar reformatted images are provided for review. MIP images are provided for review. Dose modulation, iterative reconstruction, and/or weight based adjustment of the mA/kV was utilized to reduce the radiation dose to as low as reasonably achievable. COMPARISON: 10/29/2020 HISTORY: ORDERING SYSTEM PROVIDED HISTORY: hypoxia TECHNOLOGIST PROVIDED HISTORY: Reason for exam:->hypoxia Reason for Exam: hypoxia Acuity: Acute Type of Exam: Initial FINDINGS: Pulmonary Arteries: Extensive pulmonary emboli are present at the bifurcations of the right and left main pulmonary arteries, with extension into the segmental pulmonary arteries. The main pulmonary artery is dilated. The right ventricle is greater in diameter than the left ventricle. Slight reflux of contrast into the hepatic veins. Mediastinum: Coronary atherosclerosis. The thyroid is within normal limits. Moderate sized hiatal hernia. No mediastinal adenopathy. Lungs/pleura: Stable 2.1 x 1.2 cm nodule laterally in the superior segment of the right lower lobe. No other nodules are present. No pleural effusion. No pneumothorax. The central airways are patent. Upper Abdomen: Limited images of the upper abdomen are unremarkable. Soft Tissues/Bones: No acute bone or soft tissue abnormality. 1. Extensive bilateral pulmonary emboli are present, beginning at the bifurcations of the right and left main pulmonary arteries with extension into the segmental pulmonary arteries. There is evidence of right heart strain. 2. 2.1 x 1.2 cm nodule in the superior segment of the right lower lobe is stable from 09/10/2020. Follow-up per guidelines below. 3. Coronary atherosclerosis.  RECOMMENDATIONS: Fleischner Society guidelines for follow-up and management of incidentally detected pulmonary nodules: Single Solid Nodule: Nodule size greater than 8 mm In a low-risk patient, consider CT at 3 months, PET/CT, or tissue sampling. In a high-risk patient, consider CT at 3 months, PET/CT, or tissue sampling. - Low risk patients include individuals with minimal or absent history of smoking and other known risk factors. - High risk patients include individuals with a history or smoking or known risk factors. Radiology 2017 http://pubs. rsna.org/doi/full/10.1148/radiol. 0133144976     EKG this visit:  Reviewed         Electronically signed by BAUDILIO Slaughter CNP on 10/29/2020 at 10:40 AM

## 2020-10-29 NOTE — ED NOTES
Pts. Wife requesting to be updated on any chagnes & plan of care. Susie Simons: [phone] (home) [phone] (cell).      Microdermis  10/29/20 1027

## 2020-10-29 NOTE — ED PROVIDER NOTES
eMERGENCY dEPARTMENT eNCOUnter      PCP: Phi Diop MD    [address] [facility]    Chief Complaint   Patient presents with   Mason Hones of Breath       HPI    Joeann Gowers is a 79 y.o. male who presents with shortness of breath. Reports increasing shortness of breath over the last several days. States it is worse with standing up, walking. Denies any orthopnea. Does report a cough, states cough is chronic, slight increase in clear sputum production. Denies chest pain. Denies lower extremity edema. Denies fever or chills. No recent illness. Reports some congestion, no sore throat. No known sick contacts. REVIEW OF SYSTEMS    Constitutional:  Denies fever, chills.    HENT:  Denies sore throat or ear pain   Cardiovascular:  Denies chest pain, palpitations   Respiratory:  + cough and shortness of breath    GI:  Denies abdominal pain, nausea, vomiting, or diarrhea  :  Denies any urinary symptoms, flank pain  Musculoskeletal:  Denies back pain, extremity pain  Skin:  Denies rash, color change  Neurologic:  Denies headache, focal weakness or sensory changes   Lymphatic:  Denies swollen glands, edema    All other review of systems are negative  See HPI and nursing notes for additional information     PAST MEDICAL AND SURGICAL HISTORY    Past Medical History:   Diagnosis Date    Acid reflux     Arthritis     \"Wrists\"    Benign prostatic hyperplasia     without outflow obstruction    BPH (benign prostatic hyperplasia)     COPD [facility]chronic obstructive pulmonary disease) ([facility])     Dizziness     DVT (deep venous thrombosis) ([facility])     Right Leg In Late 1980's, Right Leg In 2002    Enlarged prostate     Essential hypertension     Glaucoma     Bilateral Eyes    Gout     [place] (hard of hearing)     Bilateral Hearing Aids    Hx of blood clots Dx Late 1980's And 2002    \"DVT Right Leg\"    Hypertension     Impaired glucose tolerance     Pneumonia 5/15/2018    Slow urinary stream     Syncope with loss of consciousness    Teeth missing     Upper And Lower    Wears glasses      Past Surgical History:   Procedure Laterality Date    ABDOMINAL EXPLORATION SURGERY  1984    \"Intestines Were Tangled, Had Salmonella Poisoning\"    APPENDECTOMY      COLONOSCOPY  2000's    DENTAL SURGERY      Teeth Extracted In Past    EYE SURGERY Right 1990's    Cataract With Lens Implant    EYE SURGERY Left 2016    Cataract With Lens Implant    INGUINAL HERNIA REPAIR Right 2000    With Mesh    INGUINAL HERNIA REPAIR Right 02/28/2018    With mesh and plug    VASECTOMY  1984       CURRENT MEDICATIONS    Current Outpatient Rx   Medication Sig Dispense Refill    demeclocycline (DECLOMYCIN) 150 MG tablet Take 150 mg by mouth 2 times daily      tamsulosin (FLOMAX) 0.4 MG capsule Take 1 capsule by mouth daily 90 capsule 1    azelastine (ASTELIN) 0.1 % nasal spray 1 spray by Nasal route 2 times daily Use in each nostril as directed      hydroxychloroquine (PLAQUENIL) 200 MG tablet Take 200 mg by mouth daily      lisinopril (PRINIVIL;ZESTRIL) 10 MG tablet Take 10 mg by mouth daily      amLODIPine (NORVASC) 10 MG tablet Take 1 tablet by mouth daily (Patient taking differently: Take 5 mg by mouth daily ) 30 tablet 3    celecoxib (CELEBREX) 200 MG capsule Take 1 capsule by mouth daily (Patient taking differently: Take 400 mg by mouth daily ) 60 capsule 3    vitamin B-1 100 MG tablet Take 1 tablet by mouth daily 30 tablet 3    zoster recombinant adjuvanted vaccine (SHINGRIX) 50 MCG/0.5ML SUSR injection 50 MCG IM then repeat 2-6 months.  0.5 mL 1    finasteride (PROSCAR) 5 MG tablet Take 1 tablet by mouth daily 90 tablet 1    allopurinol (ZYLOPRIM) 100 MG tablet Take 1 tablet by mouth daily 90 tablet 1    ADVAIR DISKUS 250-50 MCG/DOSE AEPB Inhale 1 puff into the lungs 2 times daily 3 Inhaler 1    lansoprazole (PREVACID) 30 MG delayed release capsule Take 30 mg by mouth daily      predniSONE (DELTASONE) 5 MG tablet Take 5 mg by mouth daily          ALLERGIES    No Known Allergies    SOCIAL AND FAMILY HISTORY    Social History     Socioeconomic History    Marital status:      Spouse name: Not on file    Number of children: Not on file    Years of education: Not on file    Highest education level: Not on file   Occupational History    Not on file   Social Needs    Financial resource strain: Not on file    Food insecurity     Worry: Not on file     Inability: Not on file    Transportation needs     Medical: Not on file     Non-medical: Not on file   Tobacco Use    Smoking status: Former Smoker     Packs/day: 1.00     Years: 29.00     Pack years: 29.00     Types: Cigarettes, Pipe     Start date:      Last attempt to quit:      Years since quittin.8    Smokeless tobacco: Never Used   Substance and Sexual Activity    Alcohol use:  Yes     Alcohol/week: 2.0 - 3.0 standard drinks     Types: 2 - 3 Glasses of wine per week     Comment: \"2 Glasses Of Wine Daily\"    Drug use: No    Sexual activity: Not Currently   Lifestyle    Physical activity     Days per week: Not on file     Minutes per session: Not on file    Stress: Not on file   Relationships    Social connections     Talks on phone: Not on file     Gets together: Not on file     Attends Restorationist service: Not on file     Active member of club or organization: Not on file     Attends meetings of clubs or organizations: Not on file     Relationship status: Not on file    Intimate partner violence     Fear of current or ex partner: Not on file     Emotionally abused: Not on file     Physically abused: Not on file     Forced sexual activity: Not on file   Other Topics Concern    Not on file   Social History Narrative    Not on file     Family History   Problem Relation Age of Onset    Dementia Mother     Diabetes type 2  Father          PHYSICAL EXAM    VITAL SIGNS: /76   Pulse 85   Temp 97.5 °F (36.4 °C) (Oral)   Resp 20   Ht 6' (1.829 m) Wt 184 lb (83.5 kg)   SpO2 (!) 89%   BMI 24.95 kg/m²    Constitutional:  Well developed, No acute distress. HENT:  Normocephalic, Atraumatic, PERRL. EOMI. Sclera clear. Conjunctiva normal.  Neck: supple without ridigity  Cardiovascular:  Regular rate and rhythm, No murmurs, + gallop  Respiratory:  Nonlabored breathing. Normal breath sounds  Abdomen: Soft, Non tender, bowel sounds present. Musculoskeletal: No edema, No tenderness  Integument:  Warm, Dry, no rash  Neurologic:  Alert & oriented , No focal deficits noted. Speech normal.  Psychiatric:  Affect normal, Mood normal.       Labs:  Labs Reviewed   CBC WITH AUTO DIFFERENTIAL - Abnormal; Notable for the following components:       Result Value    RBC 3.71 (*)     Hemoglobin 12.6 (*)     Hematocrit 36.8 (*)     MCH 34.0 (*)     Segs Relative 66.2 (*)     Lymphocytes % 20.6 (*)     Monocytes % 6.8 (*)     Eosinophils % 4.9 (*)     Immature Neutrophil % 0.9 (*)     All other components within normal limits   COMPREHENSIVE METABOLIC PANEL - Abnormal; Notable for the following components:    Sodium 132 (*)     Chloride 98 (*)     CO2 20 (*)     Alb 3.1 (*)     All other components within normal limits   TROPONIN - Abnormal; Notable for the following components:    Troponin T 0.023 (*)     All other components within normal limits   BRAIN NATRIURETIC PEPTIDE - Abnormal; Notable for the following components:    Pro-BNP 10,271 (*)     All other components within normal limits   COVID-19         EKG    This EKG was interpreted by me. Rate is 88, rhythm is sinus. Right bundle branch block. ID and QT intervals are within normal limits. There is no ST segment or T wave changes. T waves inverted in leads II, 3, aVF, V3 through V6. This EKG was compared to previous EKG from date 9/12/2020. T wave inversions in V5 and 6 appear progressed from previous. Progression of right bundle branch block.     RADIOLOGY    Xr Chest Portable    Result Date: Pulmonary emboli with heart strain, hypoxia           (Please note the MDM and HPI sections of this note were completed with a voice recognition program.  Efforts were made to edit the dictations but occasionally words are mis-transcribed.)      Jamari Redd DO  10/29/20 0848

## 2020-10-29 NOTE — ED NOTES
Report received from Albaro/Chikis QIU & care assumed at this time.      Ephraim Sarabia  10/29/20 0444

## 2020-10-29 NOTE — ED TRIAGE NOTES
Pt to ED via EMS with complaints of shortness of breath. SOB started about a week ago but pt states it has progressively worsened. No fevers.

## 2020-10-29 NOTE — PROGRESS NOTES
Dr. Chuy Pena at bedside, verbal order to discontinue tpa and heparin at 1800. Wait 90 minutes and pull sheaths.

## 2020-10-29 NOTE — ED NOTES
Patient resting comfortably. Patient denies any needs at this time.      Maximiliano Pena RN  10/29/20 4826

## 2020-10-29 NOTE — ED NOTES
Bed: ED-26  Expected date:   Expected time:   Means of arrival:   Comments:  srini Hensley RN  10/29/20 9538

## 2020-10-29 NOTE — ED NOTES
Spoke with radiology, stated they would be in room shortly to do ultrasound of lower legs.      Cameron Blanco  10/29/20 0113

## 2020-10-29 NOTE — PROGRESS NOTES
Pt arrived from cath lab. Skin and sheath sites assessed w/ Belia Ache. Belongings w/ patient on transfer include clothes, cell phone, glasses, ring, and bracelet. Educated patient on need for limited mobility while sheaths in.

## 2020-10-29 NOTE — PROCEDURES
Pulmonary Angiogram and EKOS Procedure Note              Procedure:   1. Right heart catheterization, oxygen saturation  2. Non selective pulmonary angiogram  2. EKOS catheter directed left and right pulmonary artery thrombectomy and TPA Infusion  4. Ultrasound guided rt femoral venous access, ultrasound guided left femoral vein access  . Indication: Acute pulmonary embolism and RV strain    Procedure: After written informed consent was obtained, the patient was   brought to the cardiac catheterization suite on empty stomach and positively identified. Patient is prepped and draped in the usual sterile fashion. 2% Xylocaine local anesthesia is given. Right Femoral vein is punctured under ultrasound and cannulated with 6 f Gambian introducer sheath, the right femoral vein is puntured again under ultrasound and 2nd 6 F sheath was introduced into the rt femoral vein. Vein lumen was intact, flow was normal, image of vessel saved    Left femoral vein is punctured under ultrasound and cannulated with 4 f Gambian catheter, vein lumen was intact and flow was normal, image of vessel saved    A 4-Gambian angled JR4 catheter was then positioned LEFT pulmonary artery over J wire and then selective right pulmonary angiogram was performed. A 4-Gambian angled pigtail catheter was then positioned at the bifurcation of the pulmonary artery and non selective pulmonary angiogram was obtained./      Decision to perform EKOS directed b/l pulmonary artery thrombectomy was made and then PIG tail catheter was was exchanged over J wire and EKOS cathter was advanced over the J wire into the right pulmonary artery and then secured at that position, followed by it, the erna wire was advanced over the JR4 catheter into left pulmonary artery and then JR4 catheter was exchanged with EKOS catheter over erna wire and positioned at the left pulmonary artery. Infusion of heparin and TPA ordered    There were no complications.  Patient

## 2020-10-29 NOTE — CONSULTS
[address], [address] [facility]                                  CONSULTATION    PATIENT NAME: Alfredo Max                   :        1940  MED REC NO:   [id number]                          ROOM:       2119  ACCOUNT NO:   [de-identified]                           ADMIT DATE: 10/29/2020  PROVIDER:     Robert Amaya MD    CONSULT DATE:  10/29/2020    HISTORY OF PRESENT ILLNESS:  The patient is an 79-year-old gentleman  with multiple medical problems including hypertension, COPD, arthritis,  gastroesophageal reflux disease who was admitted through the emergency  room with complaints of increasing shortness of breath. He denied any  chest pains. He denied any significant cough. He denied any fever or  chills. He denied any nausea or vomiting. The patient had a CAT scan  of the chest, which showed extensive bilateral PE with evidence of right  heart strain. The patient recently was found to have lung nodule for  which a PET scan was ordered. The patient did not want the PET scan in  this hospital and went to [facility] and then did not followup. He also had  [facility] during his last hospitalization, which was positive and home  sleep study was ordered, which he did not get. PAST MEDICAL HISTORY:  Significant for hypertension, COPD, arthritis,  gastroesophageal reflux disease. PAST SURGICAL HISTORY:  Remarkable for vasectomy, inguinal hernia  repair, eye surgery, colonoscopy, appendectomy, abdominal exploration  surgery. FAMILY HISTORY:  Reveals that his mother had dementia, father had  diabetes. SOCIAL HISTORY:  Reveals that he quit smoking in , but used to smoke  a pack per day for 29 years prior to that. He drinks alcohol off and  on. No history of drug abuse. MEDICATIONS:  Reviewed. ALLERGIES:  He has no known allergies.     REVIEW OF SYSTEMS:  10- to 14-point review of systems was reviewed and  is negative except for what is mentioned in the history of present  illness. PHYSICAL EXAMINATION:  GENERAL:  The patient is alert, oriented x3, in no acute respiratory  distress. VITAL SIGNS:  His blood pressure is 154/83 mmHg, pulse of 83 per minute  and respiratory rate of 16 per minute. He is afebrile. His saturation  is 96% on 4 liters nasal cannula. HEENT:  Essentially unremarkable. There is no JVD, no lymphadenopathy. NECK:  Supple. LUNGS:  Revealed diminished breath sounds. There were no rhonchi. HEART:  Showed normal S1 and S2. There was no S3 or S4 noted. ABDOMEN:  Benign. There was no evidence of any organomegaly. The bowel  sounds are present. NEUROLOGIC:  Reveals that he is awake and responsive, answering  questions appropriately and moving his extremities. LABORATORY DATA:  His CAT scan of the chest showed extensive bilateral  PE beginning at the bifurcation of the right and left pulmonary artery  with extension to the segmental pulmonary arteries with evidence of  right heart strain, 2.1 x 1.2 cm nodule in the superior segment of the  right lower lobe. His COVID-19 test was negative. His proBNP was  49151. His electrolytes showed a sodium of 132, potassium 4.6, chloride  98, carbon dioxide 20, BUN 18, creatinine 1.1. His CBC showed a white  count of 8.8, hemoglobin 12.6, hematocrit 36.8. IMPRESSION:  1. Acute bilateral PE with significant clot burden and evidence of  right heart strain on CAT scan of the chest.  2.  COPD. 3.  Right lower lobe lung nodule. 4.  Possible underlying obstructive sleep apnea. The patient did not  get the sleep study. PLAN:  1. Continue anticoagulation. 2.  Echocardiogram to evaluate right heart strain. If right heart  strain is present, proceed with EKOS. 3.  Bevespi 2 puffs four times a day. 4.  Check CEA level. 5.  Check hypercoagulation profile. 6.  Duplex leg scan to rule out DVT.   7.  We will discuss with Dr. Connie Myers to proceed with EKOS if echo shows  right heart strain. 8.  Get records from [address] [place] [address]. 9.  Pt will F/U with another pulmonologist after discharge at [address] [place] [address].         Mary Anderson MD    D: 10/29/2020 10:38:43       T: 10/29/2020 13:23:58     /[email or web address]  Job#: [id number]     Doc#: [id number]    CC:

## 2020-10-29 NOTE — CONSULTS
Chart reviewed  Full note to follow  Echo for rv strain                      Name:  Chana Tubbs /Age/Sex: 1940  (79 y.o. male)   MRN & CSN:  [id number] & [id number] Admission Date/Time: 10/29/2020  6:23 AM   Location:  ED26/ED-26 PCP: Iván Harris, [facility] Dioni Roth Day: 1          Referring physician:  Maisha Koch MD         Reason for consultation:  pe        Thanks for referral.    Information source: patient    CC;  sob      HPI:   Thank you for involving me in taking  care of Chana Tubbs who  is a 79 y. o.year  Old male  Presents with  H/O dvt,NOW  WITH moderate sob OF ACUTE ONSET , has no CP,   CT              1. Extensive bilateral pulmonary emboli are present, beginning at the    bifurcations of the right and left main pulmonary arteries with extension    into the segmental pulmonary arteries. Amor Spatz is evidence of right heart    strain. 2. 2.1 x 1.2 cm nodule in the superior segment of the right lower lobe is    stable from 09/10/2020.  Follow-up per guidelines below. 3. Coronary atherosclerosis. Echo with RV strain. DW pt reg EKOS , pt wants medical therapy only with anticoag. Past medical history:    has a past medical history of Acid reflux, Arthritis, Benign prostatic hyperplasia, BPH (benign prostatic hyperplasia), COPD (chronic obstructive pulmonary disease) ([address] Dizziness, DVT (deep venous thrombosis) ([address] Enlarged prostate, Essential hypertension, Glaucoma, Gout, Lummi (hard of hearing), Hx of blood clots, Hypertension, Impaired glucose tolerance, Pneumonia, Slow urinary stream, Syncope, Teeth missing, and Wears glasses. Past surgical history:   has a past surgical history that includes eye surgery (Right, 's); eye surgery (Left, ); Dental surgery; Colonoscopy (); Inguinal hernia repair (Right, ); Vasectomy (); Abdominal exploration surgery (); Inguinal hernia repair (Right, 2018); and Appendectomy.   Social History:   reports that he quit smoking about 36 years ago. His smoking use included cigarettes and pipe. He started smoking about 65 years ago. He has a 29.00 pack-year smoking history. He has never used smokeless tobacco. He reports current alcohol use of about 2.0 - 3.0 standard drinks of alcohol per week. He reports that he does not use drugs. Family history:  family history includes Dementia in his mother; Diabetes type 2  in his father. No Known Allergies    sodium chloride flush 0.9 % injection 10 mL, PRN      Current Facility-Administered Medications   Medication Dose Route Frequency Provider Last Rate Last Dose    sodium chloride flush 0.9 % injection 10 mL  10 mL Intravenous PRN Virgil Stewart DO   10 mL at 10/29/20 1924     Current Outpatient Medications   Medication Sig Dispense Refill    demeclocycline (DECLOMYCIN) 150 MG tablet Take 150 mg by mouth 2 times daily      tamsulosin (FLOMAX) 0.4 MG capsule Take 1 capsule by mouth daily 90 capsule 1    azelastine (ASTELIN) 0.1 % nasal spray 1 spray by Nasal route 2 times daily Use in each nostril as directed      hydroxychloroquine (PLAQUENIL) 200 MG tablet Take 200 mg by mouth daily      lisinopril (PRINIVIL;ZESTRIL) 10 MG tablet Take 10 mg by mouth daily      amLODIPine (NORVASC) 10 MG tablet Take 1 tablet by mouth daily (Patient taking differently: Take 5 mg by mouth daily ) 30 tablet 3    celecoxib (CELEBREX) 200 MG capsule Take 1 capsule by mouth daily (Patient taking differently: Take 400 mg by mouth daily ) 60 capsule 3    vitamin B-1 100 MG tablet Take 1 tablet by mouth daily 30 tablet 3    zoster recombinant adjuvanted vaccine (SHINGRIX) 50 MCG/0.5ML SUSR injection 50 MCG IM then repeat 2-6 months.  0.5 mL 1    finasteride (PROSCAR) 5 MG tablet Take 1 tablet by mouth daily 90 tablet 1    allopurinol (ZYLOPRIM) 100 MG tablet Take 1 tablet by mouth daily 90 tablet 1    ADVAIR DISKUS 250-50 MCG/DOSE AEPB Inhale 1 puff into the lungs 2 times daily 3 Inhaler 1    lansoprazole (PREVACID) 30 MG delayed release capsule Take 30 mg by mouth daily      predniSONE (DELTASONE) 5 MG tablet Take 5 mg by mouth daily        Review of Systems:  All 14 systems reviewed, all negative except for  SOB    Physical Examination:    BP (!) 154/83   Pulse 83   Temp 97.5 °F (36.4 °C) (Oral)   Resp 20   Ht 6' (1.829 m)   Wt 184 lb (83.5 kg)   SpO2 96%   BMI 24.95 kg/m²      Wt Readings from Last 3 Encounters:   10/29/20 184 lb (83.5 kg)   10/16/20 184 lb 12.8 oz (83.8 kg)   10/01/20 184 lb (83.5 kg)     Body mass index is 24.95 kg/m². General Appearance:  fair  Head: normocephalic     Eyes: normal, noninjected conjunctiva    ENT: normal mucosa, noninjected throat, normal     NECK: No JVP  No thyromegaly        Cardiovascular: No thrills palpated   Auscultation: Normal S1 and S2,  no murmur   carotid bruit no   Abdominal Aorta no bruit    Respiratory:    Breath sounds Diminshed bilaterally     Extremities:  1+ Edema clubbing ,   no cyanosis    SKIN: Warm and well perfused, no pallor or cyanosis    Vascular exam:  Pedal Pulses: palp  bilaterally        Abdomen:  No masses or tenderness. No organomegaly noted. Neurological:  Oriented to time, place, and person   No focal neurological deficit noted. Psychiatric:normal mood, no anxiety    Lab Review   Recent Labs     10/29/20  0640   WBC 8.8   HGB 12.6*   HCT 36.8*         Recent Labs     10/29/20  0640   *   K 4.6   CL 98*   CO2 20*   BUN 18   CREATININE 1.1     Recent Labs     10/29/20  0640   AST 15   ALT 10   BILITOT 0.3   ALKPHOS 54     No results for input(s): TROPONINI in the last 72 hours.   Lab Results   Component Value Date    BNP 14 09/09/2012     Lab Results   Component Value Date    INR 1.63 04/08/2019    PROTIME 18.8 (H) 04/08/2019           Assessment/Recommendations:     - PE with RV strain; anticoag  - Pls get pulm consult,   - LE v doppler    Summary   Left ventricular systolic function is normal.   Ejection fraction is visually estimated at 50-55%. Flattened interventricular septum in diastole and systole consistent with   right ventricular volume and pressure overload. Grade I diastolic dysfunction. Moderately dilated right heart. Positive Cohen's sign suggestive of acute PE. Mitral valve prolapse involving the anterior leaflet. Mild to moderate eccentric mitral regurgitation is present. Mild to moderate tricuspid regurgitation is present. RVSP is 67 mmHg. No evidence of any pericardial effusion.       Signature      ------------------------------------------------------------------   Electronically signed by Elijah Urbina MD   (Interpreting physician) on 10/29/2020 at 09:58 AM     Suzy Patel MD, 10/29/2020 9:59 AM

## 2020-10-29 NOTE — H&P
Surgical History:   Procedure Laterality Date    ABDOMINAL EXPLORATION SURGERY  1984    \"Intestines Were Tangled, Had Salmonella Poisoning\"    APPENDECTOMY      COLONOSCOPY  2000's    DENTAL SURGERY      Teeth Extracted In Past    EYE SURGERY Right 1990's    Cataract With Lens Implant    EYE SURGERY Left 2016    Cataract With Lens Implant    INGUINAL HERNIA REPAIR Right 2000    With Mesh    INGUINAL HERNIA REPAIR Right 02/28/2018    With mesh and plug    VASECTOMY  1984        ALLERGIES:   Patient has no known allergies. ?     SOCIAL HISTORY:    reports that he quit smoking about 36 years ago. His smoking use included cigarettes and pipe. He started smoking about 65 years ago. He has a 29.00 pack-year smoking history. He has never used smokeless tobacco. He reports current alcohol use of about 2.0 - 3.0 standard drinks of alcohol per week. He reports that he does not use drugs. ***   ? FAMILY HISTORY:   Family History   Problem Relation Age of Onset    Dementia Mother     Diabetes type 2  Father    ***   ?     MEDICATIONS:   Current Facility-Administered Medications   Medication Dose Route Frequency Provider Last Rate Last Dose    sodium chloride flush 0.9 % injection 10 mL  10 mL Intravenous PRN Josi Mendez, DO   10 mL at 10/29/20 8770     Current Outpatient Medications   Medication Sig Dispense Refill    demeclocycline (DECLOMYCIN) 150 MG tablet Take 150 mg by mouth 2 times daily      tamsulosin (FLOMAX) 0.4 MG capsule Take 1 capsule by mouth daily 90 capsule 1    azelastine (ASTELIN) 0.1 % nasal spray 1 spray by Nasal route 2 times daily Use in each nostril as directed      hydroxychloroquine (PLAQUENIL) 200 MG tablet Take 200 mg by mouth daily      lisinopril (PRINIVIL;ZESTRIL) 10 MG tablet Take 10 mg by mouth daily      amLODIPine (NORVASC) 10 MG tablet Take 1 tablet by mouth daily (Patient taking differently: Take 5 mg by mouth daily ) 30 tablet 3    celecoxib (CELEBREX) 200 MG capsule 6. 2 M/CU MM    Hemoglobin 12.6 (L) 13.5 - 18.0 GM/DL    Hematocrit 36.8 (L) 42 - 52 %    MCV 99.2 78 - 100 FL    MCH 34.0 (H) 27 - 31 PG    MCHC 34.2 32.0 - 36.0 %    RDW 13.7 11.7 - 14.9 %    Platelets 152 021 - 195 K/CU MM    MPV 10.5 7.5 - 11.1 FL    Differential Type AUTOMATED DIFFERENTIAL     Segs Relative 66.2 (H) 36 - 66 %    Lymphocytes % 20.6 (L) 24 - 44 %    Monocytes % 6.8 (H) 0 - 4 %    Eosinophils % 4.9 (H) 0 - 3 %    Basophils % 0.6 0 - 1 %    Segs Absolute 5.8 K/CU MM    Lymphocytes Absolute 1.8 K/CU MM    Monocytes Absolute 0.6 K/CU MM    Eosinophils Absolute 0.4 K/CU MM    Basophils Absolute 0.1 K/CU MM    Nucleated RBC % 0.0 %    Total Nucleated RBC 0.0 K/CU MM    Total Immature Neutrophil 0.08 K/CU MM    Immature Neutrophil % 0.9 (H) 0 - 0.43 %   CMP   Result Value Ref Range    Sodium 132 (L) 135 - 145 MMOL/L    Potassium 4.6 3.5 - 5.1 MMOL/L    Chloride 98 (L) 99 - 110 mMol/L    CO2 20 (L) 21 - 32 MMOL/L    BUN 18 6 - 23 MG/DL    CREATININE 1.1 0.9 - 1.3 MG/DL    Glucose 91 70 - 99 MG/DL    Calcium 8.7 8.3 - 10.6 MG/DL    Alb 3.1 (L) 3.4 - 5.0 GM/DL    Total Protein 7.0 6.4 - 8.2 GM/DL    Total Bilirubin 0.3 0.0 - 1.0 MG/DL    ALT 10 10 - 40 U/L    AST 15 15 - 37 IU/L    Alkaline Phosphatase 54 40 - 129 IU/L    GFR Non-African American >60 >60 mL/min/1.73m2    GFR African American >60 >60 mL/min/1.73m2    Anion Gap 14 4 - 16   Troponin   Result Value Ref Range    Troponin T 0.023 (H) <0.01 NG/ML   Brain Natriuretic Peptide   Result Value Ref Range    Pro-BNP 10,271 (H) <300 PG/ML   COVID-19    Specimen: Nasopharyngeal Swab   Result Value Ref Range    Source NASOPHARYNGEAL SWAB     SARS-CoV-2, NAAT NOT DETECTED    APTT   Result Value Ref Range    aPTT 30.5 25.1 - 37.1 SECONDS   EKG 12 Lead   Result Value Ref Range    Ventricular Rate 88 BPM    Atrial Rate 88 BPM    P-R Interval 186 ms    QRS Duration 126 ms    Q-T Interval 412 ms    QTc Calculation (Bazett) 498 ms    P Axis 61 degrees    R Axis 91 anticoagulation. Old records reviewed. Medications reviewed with patient. Patient is a Full Code-discussed     All questions and concerns addressed at this time, and patient is in agreement with current treatment plan.      Regine Vasquez MD   Hospitalist at [facility]

## 2020-10-30 VITALS
DIASTOLIC BLOOD PRESSURE: 66 MMHG | HEIGHT: 72 IN | TEMPERATURE: 97.8 F | WEIGHT: 184 LBS | RESPIRATION RATE: 24 BRPM | OXYGEN SATURATION: 93 % | SYSTOLIC BLOOD PRESSURE: 99 MMHG | BODY MASS INDEX: 24.92 KG/M2 | HEART RATE: 87 BPM

## 2020-10-30 LAB
ALBUMIN SERPL-MCNC: 3.2 GM/DL (ref 3.4–5)
ALP BLD-CCNC: 53 IU/L (ref 40–128)
ALT SERPL-CCNC: 9 U/L (ref 10–40)
ANION GAP SERPL CALCULATED.3IONS-SCNC: 11 MMOL/L (ref 4–16)
AST SERPL-CCNC: 13 IU/L (ref 15–37)
BASOPHILS ABSOLUTE: 0 K/CU MM
BASOPHILS RELATIVE PERCENT: 0.4 % (ref 0–1)
BILIRUB SERPL-MCNC: 0.6 MG/DL (ref 0–1)
BUN BLDV-MCNC: 13 MG/DL (ref 6–23)
CALCIUM SERPL-MCNC: 8.5 MG/DL (ref 8.3–10.6)
CHLORIDE BLD-SCNC: 103 MMOL/L (ref 99–110)
CO2: 24 MMOL/L (ref 21–32)
CREAT SERPL-MCNC: 1.1 MG/DL (ref 0.9–1.3)
DIFFERENTIAL TYPE: ABNORMAL
EOSINOPHILS ABSOLUTE: 0.6 K/CU MM
EOSINOPHILS RELATIVE PERCENT: 6.7 % (ref 0–3)
GFR AFRICAN AMERICAN: >60 ML/MIN/1.73M2
GFR NON-AFRICAN AMERICAN: >60 ML/MIN/1.73M2
GLUCOSE BLD-MCNC: 88 MG/DL (ref 70–99)
HCT VFR BLD CALC: 36 % (ref 42–52)
HEMOGLOBIN: 12.1 GM/DL (ref 13.5–18)
IMMATURE NEUTROPHIL %: 0.2 % (ref 0–0.43)
LYMPHOCYTES ABSOLUTE: 1.3 K/CU MM
LYMPHOCYTES RELATIVE PERCENT: 13.9 % (ref 24–44)
MAGNESIUM: 1.8 MG/DL (ref 1.8–2.4)
MCH RBC QN AUTO: 33.7 PG (ref 27–31)
MCHC RBC AUTO-ENTMCNC: 33.6 % (ref 32–36)
MCV RBC AUTO: 100.3 FL (ref 78–100)
MONOCYTES ABSOLUTE: 0.7 K/CU MM
MONOCYTES RELATIVE PERCENT: 7.7 % (ref 0–4)
NUCLEATED RBC %: 0 %
PDW BLD-RTO: 13.7 % (ref 11.7–14.9)
PHOSPHORUS: 4.3 MG/DL (ref 2.5–4.9)
PLATELET # BLD: 157 K/CU MM (ref 140–440)
PMV BLD AUTO: 10.1 FL (ref 7.5–11.1)
POTASSIUM SERPL-SCNC: 4.3 MMOL/L (ref 3.5–5.1)
RBC # BLD: 3.59 M/CU MM (ref 4.6–6.2)
SEGMENTED NEUTROPHILS ABSOLUTE COUNT: 6.5 K/CU MM
SEGMENTED NEUTROPHILS RELATIVE PERCENT: 71.1 % (ref 36–66)
SODIUM BLD-SCNC: 138 MMOL/L (ref 135–145)
TOTAL IMMATURE NEUTOROPHIL: 0.02 K/CU MM
TOTAL NUCLEATED RBC: 0 K/CU MM
TOTAL PROTEIN: 6.6 GM/DL (ref 6.4–8.2)
WBC # BLD: 9.1 K/CU MM (ref 4–10.5)

## 2020-10-30 PROCEDURE — 85025 COMPLETE CBC W/AUTO DIFF WBC: CPT

## 2020-10-30 PROCEDURE — 6370000000 HC RX 637 (ALT 250 FOR IP): Performed by: NURSE PRACTITIONER

## 2020-10-30 PROCEDURE — 6370000000 HC RX 637 (ALT 250 FOR IP): Performed by: INTERNAL MEDICINE

## 2020-10-30 PROCEDURE — 94760 N-INVAS EAR/PLS OXIMETRY 1: CPT

## 2020-10-30 PROCEDURE — 83735 ASSAY OF MAGNESIUM: CPT

## 2020-10-30 PROCEDURE — 2580000003 HC RX 258: Performed by: INTERNAL MEDICINE

## 2020-10-30 PROCEDURE — 94640 AIRWAY INHALATION TREATMENT: CPT

## 2020-10-30 PROCEDURE — 80053 COMPREHEN METABOLIC PANEL: CPT

## 2020-10-30 PROCEDURE — 99232 SBSQ HOSP IP/OBS MODERATE 35: CPT | Performed by: INTERNAL MEDICINE

## 2020-10-30 PROCEDURE — 84100 ASSAY OF PHOSPHORUS: CPT

## 2020-10-30 PROCEDURE — 94761 N-INVAS EAR/PLS OXIMETRY MLT: CPT

## 2020-10-30 RX ORDER — AMLODIPINE BESYLATE 5 MG/1
5 TABLET ORAL DAILY
Qty: 30 TABLET | Refills: 3 | Status: SHIPPED | OUTPATIENT
Start: 2020-10-31 | End: 2021-02-19 | Stop reason: SDUPTHER

## 2020-10-30 RX ORDER — ASPIRIN 81 MG/1
81 TABLET, CHEWABLE ORAL DAILY
Qty: 30 TABLET | Refills: 3 | Status: SHIPPED | OUTPATIENT
Start: 2020-10-31 | End: 2021-04-26 | Stop reason: ALTCHOICE

## 2020-10-30 RX ORDER — SODIUM CHLORIDE 0.9 % (FLUSH) 0.9 %
10 SYRINGE (ML) INJECTION PRN
Status: DISCONTINUED | OUTPATIENT
Start: 2020-10-30 | End: 2020-10-30 | Stop reason: HOSPADM

## 2020-10-30 RX ORDER — SODIUM CHLORIDE 9 MG/ML
INJECTION, SOLUTION INTRAVENOUS CONTINUOUS PRN
Status: DISCONTINUED | OUTPATIENT
Start: 2020-10-30 | End: 2020-10-30 | Stop reason: HOSPADM

## 2020-10-30 RX ORDER — ONDANSETRON 2 MG/ML
4 INJECTION INTRAMUSCULAR; INTRAVENOUS EVERY 6 HOURS PRN
Status: DISCONTINUED | OUTPATIENT
Start: 2020-10-30 | End: 2020-10-30 | Stop reason: HOSPADM

## 2020-10-30 RX ORDER — ACETAMINOPHEN 325 MG/1
650 TABLET ORAL EVERY 4 HOURS PRN
Status: DISCONTINUED | OUTPATIENT
Start: 2020-10-30 | End: 2020-10-30 | Stop reason: HOSPADM

## 2020-10-30 RX ORDER — ASPIRIN 81 MG/1
81 TABLET, CHEWABLE ORAL DAILY
Status: DISCONTINUED | OUTPATIENT
Start: 2020-10-30 | End: 2020-10-30 | Stop reason: HOSPADM

## 2020-10-30 RX ORDER — SIMVASTATIN 20 MG
20 TABLET ORAL NIGHTLY
Status: DISCONTINUED | OUTPATIENT
Start: 2020-10-30 | End: 2020-10-30 | Stop reason: HOSPADM

## 2020-10-30 RX ORDER — SODIUM CHLORIDE 0.9 % (FLUSH) 0.9 %
10 SYRINGE (ML) INJECTION EVERY 12 HOURS SCHEDULED
Status: DISCONTINUED | OUTPATIENT
Start: 2020-10-30 | End: 2020-10-30 | Stop reason: HOSPADM

## 2020-10-30 RX ORDER — SIMVASTATIN 20 MG
20 TABLET ORAL NIGHTLY
Qty: 30 TABLET | Refills: 3 | Status: CANCELLED | OUTPATIENT
Start: 2020-10-30

## 2020-10-30 RX ADMIN — LISINOPRIL 10 MG: 5 TABLET ORAL at 08:33

## 2020-10-30 RX ADMIN — PREDNISONE 5 MG: 5 TABLET ORAL at 08:33

## 2020-10-30 RX ADMIN — FINASTERIDE 5 MG: 5 TABLET, FILM COATED ORAL at 08:33

## 2020-10-30 RX ADMIN — ALBUTEROL SULFATE 2 PUFF: 90 AEROSOL, METERED RESPIRATORY (INHALATION) at 11:45

## 2020-10-30 RX ADMIN — BUDESONIDE AND FORMOTEROL FUMARATE DIHYDRATE 2 PUFF: 160; 4.5 AEROSOL RESPIRATORY (INHALATION) at 08:26

## 2020-10-30 RX ADMIN — ALLOPURINOL 100 MG: 100 TABLET ORAL at 08:33

## 2020-10-30 RX ADMIN — HYDROXYCHLOROQUINE SULFATE 200 MG: 200 TABLET ORAL at 08:33

## 2020-10-30 RX ADMIN — AMLODIPINE BESYLATE 5 MG: 5 TABLET ORAL at 08:33

## 2020-10-30 RX ADMIN — PANTOPRAZOLE SODIUM 40 MG: 40 TABLET, DELAYED RELEASE ORAL at 05:55

## 2020-10-30 RX ADMIN — APIXABAN 10 MG: 5 TABLET, FILM COATED ORAL at 08:32

## 2020-10-30 RX ADMIN — GLYCOPYRROLATE AND FORMOTEROL FUMARATE 2 PUFF: 9; 4.8 AEROSOL, METERED RESPIRATORY (INHALATION) at 08:27

## 2020-10-30 RX ADMIN — SODIUM CHLORIDE, PRESERVATIVE FREE 10 ML: 5 INJECTION INTRAVENOUS at 09:17

## 2020-10-30 RX ADMIN — ASPIRIN 81 MG: 81 TABLET, CHEWABLE ORAL at 08:33

## 2020-10-30 RX ADMIN — AZELASTINE HYDROCHLORIDE 1 SPRAY: 137 SPRAY, METERED NASAL at 08:32

## 2020-10-30 RX ADMIN — Medication 100 MG: at 08:33

## 2020-10-30 RX ADMIN — ALBUTEROL SULFATE 2 PUFF: 90 AEROSOL, METERED RESPIRATORY (INHALATION) at 08:25

## 2020-10-30 NOTE — PROGRESS NOTES
Home O2 paperwork faxed to [facility]. Please do not discharge pt without oxygen.  Call PFT @ [phone] or Respiratory Therapy @ [phone] prior to discharge

## 2020-10-30 NOTE — PLAN OF CARE
Problem: Infection:  Goal: Will remain free from infection  Description: Will remain free from infection  Outcome: Ongoing     Problem: Safety:  Goal: Free from accidental physical injury  Description: Free from accidental physical injury  Outcome: Ongoing  Goal: Free from intentional harm  Description: Free from intentional harm  Outcome: Ongoing     Problem: Daily Care:  Goal: Daily care needs are met  Description: Daily care needs are met  Outcome: Ongoing     Problem: Pain:  Goal: Patient's pain/discomfort is manageable  Description: Patient's pain/discomfort is manageable  Outcome: Ongoing     Problem: Skin Integrity:  Goal: Skin integrity will stabilize  Description: Skin integrity will stabilize  Outcome: Ongoing     Problem: Discharge Planning:  Goal: Patients continuum of care needs are met  Description: Patients continuum of care needs are met  Outcome: Ongoing This note was copied from the mother's chart. Mom given discharge information encouraged to make follow up before she leaves today. Mom needs to call for help with next feeding.

## 2020-10-30 NOTE — DISCHARGE SUMMARY
Rachel Schwartz 1940 [id number]  PCP:  Jules Mcdonald MD    Admit date: 10/29/2020  Admitting Physician: Brandin Shore MD    Discharge date: 10/30/2020 Discharge Physician: Kunal Pineda MD         Hospital Course and Discharge Diagnoses Include:    Acute pulmonary embolism with acute cor pulmonale. Acute Resp failure  -  Echo shows evidence of RH strain.   - CTA chest: extensive b/l PE at bifurcations of right and left main pulm artery  - s/p EKOS 10/29  - started on oral eliquis now  - LE duplex: + DVT b/l   - cardio consulted, stable for d/c if pt ambulates  - Pulm consulted  - Home O2 eval done, pt qualified for Home O2                     Rheumatoid arthritis-on immunosuppressive therapy/chronic prednisone      COPD- continue PRN and routine breathing treatments      Essential hypertension- continue home antihypertensive regimen- Monitor BP trends. Patient was seen in hospital for COPD. I am prescribing oxygen because the diagnosis and testing requires the patient to have oxygen in the home. Conditions will improve or be benefited by oxygen use. The patient is able to perform good mobility and therefore requires the use of a portable oxygen system for ambulation. Physical Exam on Discharge date: 10/30/20  Gen:  awake, alert, no apparent distress  Head/Eyes:  Normocephalic atraumatic, EOMI   NECK:   symmetrical, trachea midline  LUNGS: Normal Effort   CARDIOVASCULAR:  Normal rate  ABDOMEN:  non distended  MUSCULOSKELETAL:  ROM WNL  NEUROLOGIC: Alert and Oriented,  Cranial nerves II-XII are grossly intact. SKIN:  no bruising or bleeding, normal skin color,  no redness    Procedures:  See above  Xr Chest Portable    Result Date: 10/29/2020  EXAMINATION: ONE XRAY VIEW OF THE CHEST 10/29/2020 6:41 am COMPARISON: 09/12/2020 HISTORY: Acute shortness of breath. FINDINGS: Heart size at the upper limit of normal.  Pulmonary vasculature is normal. Lingular opacity is unchanged. Unchanged nodule in the superior segment of the right lower lobe as seen on recent chest CT no acute airspace disease, pleural effusion, or pneumothorax. Unchanged lingular opacity which may represent atelectasis. Unchanged nodule in the superior segment of the right lower lobe as seen on recent chest CT. No acute abnormality seen. Vl Dup Lower Extremity Venous Bilateral    Result Date: 10/29/2020  EXAMINATION: DUPLEX VENOUS ULTRASOUND OF THE BILATERAL LOWER EXTREMITIES, 10/29/2020 9:21 am TECHNIQUE: Duplex ultrasound and Doppler images were obtained of the bilateral lower extremities COMPARISON: 05/16/2018 HISTORY: ORDERING SYSTEM PROVIDED HISTORY: PE TECHNOLOGIST PROVIDED HISTORY: Reason for exam:->PE Acuity: Acute The patient has pulmonary emboli. Evaluate for DVT. FINDINGS: There is nonocclusive thrombus within the mid and distal right femoral and popliteal veins. There is nonocclusive thrombus within the distal left femoral vein. There is occlusive thrombus within the left peroneal vein. The remainder of the veins are patent, compressible, and demonstrate normal phasic flow. 1. On the right, the study is positive for DVT in the mid and distal femoral and popliteal veins. 2. On the left, the study is positive for DVT in the distal femoral and peroneal veins. Results of this examination were verbally communicated to Nora Medeiros at 10:45 a.m. on 10/29/2020. Cta Pulmonary W Contrast    Result Date: 10/30/2020  EXAMINATION: CTA OF THE CHEST 10/29/2020 8:16 am TECHNIQUE: CTA of the chest was performed after the administration of intravenous contrast.  Multiplanar reformatted images are provided for review. MIP images are provided for review. Dose modulation, iterative reconstruction, and/or weight based adjustment of the mA/kV was utilized to reduce the radiation dose to as low as reasonably achievable.  COMPARISON: 10/29/2020 HISTORY: ORDERING SYSTEM PROVIDED HISTORY: hypoxia TECHNOLOGIST 2017 http://pubs. rsna.org/doi/full/10.1148/radiol. 4614014559       Significant Diagnostic Studies at discharge:   CBC:   Lab Results   Component Value Date    WBC 9.1 10/30/2020    RBC 3.59 10/30/2020    HGB 12.1 10/30/2020    HCT 36.0 10/30/2020    .3 10/30/2020    MCH 33.7 10/30/2020    MCHC 33.6 10/30/2020    RDW 13.7 10/30/2020     10/30/2020    MPV 10.1 10/30/2020       Patient Instructions:     Medication List      START taking these medications    apixaban 5 MG Tabs tablet  Commonly known as:  Eliquis DVT/PE Starter Pack  Take 10 mg (2 tablets) orally twice daily for 7 days, then take 5 mg (1 tablet) orally twice daily thereafter. aspirin 81 MG chewable tablet  Take 1 tablet by mouth daily  Start taking on:  October 31, 2020        CHANGE how you take these medications    amLODIPine 5 MG tablet  Commonly known as:  NORVASC  Take 1 tablet by mouth daily  Start taking on:  October 31, 2020  What changed:    · medication strength  · how much to take        CONTINUE taking these medications    Advair Diskus 250-50 MCG/DOSE Aepb  Generic drug:  fluticasone-salmeterol  Inhale 1 puff into the lungs 2 times daily     allopurinol 100 MG tablet  Commonly known as:  ZYLOPRIM  Take 1 tablet by mouth daily     azelastine 0.1 % nasal spray  Commonly known as:  ASTELIN     demeclocycline 150 MG tablet  Commonly known as:  DECLOMYCIN     finasteride 5 MG tablet  Commonly known as:  PROSCAR  Take 1 tablet by mouth daily     hydroxychloroquine 200 MG tablet  Commonly known as:  PLAQUENIL     lisinopril 10 MG tablet  Commonly known as:  PRINIVIL;ZESTRIL     predniSONE 5 MG tablet  Commonly known as:  DELTASONE     Prevacid 30 MG delayed release capsule  Generic drug:  lansoprazole     thiamine 100 MG tablet  Take 1 tablet by mouth daily     zoster recombinant adjuvanted vaccine 50 MCG/0.5ML Susr injection  Commonly known as: Shingrix  50 MCG IM then repeat 2-6 months.         STOP taking these medications celecoxib 200 MG capsule  Commonly known as:  CELEBREX     tamsulosin 0.4 MG capsule  Commonly known as:  FLOMAX           Where to Get Your Medications      These medications were sent to [facility] [address], [address] [phone] - [phone]  [address] [address]    Phone:  [phone]   · amLODIPine 5 MG tablet  · apixaban 5 MG Tabs tablet  · aspirin 81 MG chewable tablet            Code Status: Full Code     Consults:   IP CONSULT TO CARDIOLOGY  IP CONSULT TO HOSPITALIST  IP CONSULT TO PULMONOLOGY    Diet: cardiac diet    Activity: activity as tolerated   Work:    Discharged Condition: good    Prognosis: Fair - Good    Disposition: home      Follow-up with   1. PCP within   5-7    Days    Follow up labs: none       Discharge Physician Signed: Kentrell Chowdhury M.D. The patient was seen and examined on day of discharge and this discharge summary is in conjunction with any daily progress note from day of discharge.   Time spent on discharge in the examination, evaluation, counseling and review of medications and discharge plan: 34 minutes

## 2020-10-30 NOTE — PROGRESS NOTES
10/30/2020 11:37 AM  Patient Room #: 2119/2119-A  Patient Name: Samantha Larson    (Step 1 Done by RN if possible otherwise call Pulmonary Diagnostics)  1. Place patient on room air at rest for at least 30 minutes. If patient falls below 88% before 30 minutes then you can record the level and stop. Record room air saturation level _85 %. If patient is at 88% or below, they will qualify for home oxygen and you can stop. If level does not fall below 88%, fill in level above. If indicated continue to Step 2. Signature:_Lavonne Wall RRT____ Date: _10/30/2020__  (Step 2&3 Done by P)  2. Ambulate patient on room air until saturation falls below 89%. Record level of room air saturation with ambulation___ %. Next, place patient back on ___lpm oxygen and ambulate, record level __%. (Note:  this level must show improvement from room air level done with ambulation.)  If patients saturation on room air with ambulation is 88% or below AND patient shows improvement with oxygen during ambulation, they will qualify for home oxygen and you can stop. If patient does not drop below 89%, then patient should have an overnight oximetry trending on room air to see if level falls below 88%. Complete level in Step 3 below. 3. Room air overnight oximetry level 88 % for___  cumulative minutes. If patients room air oxygen level is < 89% for at least 5 cumulative minutes, patient will qualify for home oxygen and you can stop. (Attach Night Trending Report)    Complete order below: Diagnosis:_COPD___  Home oxygen at:  Length of Need: X? Lifetime ?  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

## 2020-10-30 NOTE — PROGRESS NOTES
Patient was seen in hospital for COPD. I am prescribing oxygen because the diagnosis and testing requires the patient to have oxygen in the home. Conditions will improve or be benefited by oxygen use. The patient is able to perform good mobility and therefore requires the use of a portable oxygen system for ambulation.

## 2020-10-30 NOTE — PROGRESS NOTES
CARDIOLOGY  NOTE        Name:  Louis Barnes /Age/Sex: 1940  (79 y.o. male)   MRN & CSN:  [id number] & [id number] Admission Date/Time: 10/29/2020  6:23 AM   Location:  -A PCP: Martell Matias MD       Hospital Day: 2        PLAN FROM CARDIOLOGY FOR TODAY:   eliquis 10 bid for 7 days then 5 bid      - cardiology consult is for:  PE    -  Interval history:  Had ekos    · ASSESSMENT/ PLAN:  1.     SP ekos to NOAC  2. Has pul HTN  3. Has DVT as well  4. Ambulate if feels OK can dc later today as far as cardio is concerned  5. Will FU as OP          Subjective: Todays complain: Patient  No SOB    HPI:  Karley Boyce is a 79 y. o.year old who and presents with had concerns including Shortness of Breath. Chief Complaint   Patient presents with    Shortness of Breath           Objective: Temperature:  Current - Temp: 98.3 °F (36.8 °C);  Max - Temp  Av.2 °F (36.8 °C)  Min: 98.1 °F (36.7 °C)  Max: 98.3 °F (36.8 °C)    Respiratory Rate : Resp  Av.2  Min: 8  Max: 23    Pulse Range: Pulse  Av.3  Min: 82  Max: 107    Blood Presuure Range:  Systolic (60RWF), AUD:702 , Min:107 , JFH:258   ; Diastolic (43AWD), XZX:10, Min:55, Max:116      Pulse ox Range: SpO2  Av.9 %  Min: 85 %  Max: 98 %    24hr I & O:      Intake/Output Summary (Last 24 hours) at 10/30/2020 0752  Last data filed at 10/30/2020 0501  Gross per 24 hour   Intake 550 ml   Output 750 ml   Net -200 ml         /63   Pulse 82   Temp 98.3 °F (36.8 °C) (Oral)   Resp 20   Ht 6' (1.829 m)   Wt 184 lb (83.5 kg)   SpO2 91%   BMI 24.95 kg/m²           Review of Systems:  All 14 systems reviewed, all negative       TELEMETRY: Sinus    has a past medical history of Acid reflux, Arthritis, Benign prostatic hyperplasia, BPH (benign prostatic hyperplasia), COPD (chronic obstructive pulmonary disease) (HCC), Dizziness, DVT (deep venous thrombosis) ([facility] [address] Enlarged prostate, Essential hypertension, Glaucoma, Gout, [place] (hard of hearing), Hx of blood clots, Hypertension, Impaired glucose tolerance, Pneumonia, Slow urinary stream, Syncope, Teeth missing, and Wears glasses. has a past surgical history that includes eye surgery (Right, 1990's); eye surgery (Left, 2016); Dental surgery; Colonoscopy (2000's); Inguinal hernia repair (Right, 2000); Vasectomy (1984); Abdominal exploration surgery (1984); Inguinal hernia repair (Right, 02/28/2018); and Appendectomy.   Physical Exam:  General:  Awake, alert, NAD  Head:normal  Eye:normal  Neck:  No JVD   Chest:  Clear to auscultation, respiration easy  Cardiovascular:  RRR S1S2  Abdomen:   nontender  Extremities:  +1 edema  Pulses; palpable  Neuro: grossly normal    Medications:    sodium chloride flush  10 mL Intravenous 2 times per day    simvastatin  20 mg Oral Nightly    budesonide-formoterol  2 puff Inhalation BID    allopurinol  100 mg Oral Daily    amLODIPine  5 mg Oral Daily    hydroxychloroquine  200 mg Oral Daily    finasteride  5 mg Oral Daily    pantoprazole  40 mg Oral QAM AC    lisinopril  10 mg Oral Daily    predniSONE  5 mg Oral Daily    tamsulosin  0.4 mg Oral Daily    thiamine  100 mg Oral Daily    sodium chloride flush  10 mL Intravenous 2 times per day    azelastine  1 spray Each Nostril BID    enoxaparin  1 mg/kg Subcutaneous BID    glycopyrrolate-formoterol  2 puff Inhalation BID    albuterol sulfate HFA  2 puff Inhalation Q4H While awake      sodium chloride       sodium chloride flush, acetaminophen, ondansetron, sodium chloride, sodium chloride flush, sodium chloride flush, acetaminophen **OR** acetaminophen, polyethylene glycol, promethazine **OR** [DISCONTINUED] ondansetron, HYDROcodone-acetaminophen, morphine, guaiFENesin-dextromethorphan    Lab Data:  CBC:   Recent Labs     10/29/20  0640 10/30/20  0555   WBC 8.8 9.1   HGB 12.6* 12.1*   HCT 36.8* 36.0*   MCV 99.2 100.3*    157     BMP:   Recent Labs     10/29/20  0640 10/30/20  0555   * 138   K 4.6 4.3   CL 98* 103   CO2 20* 24   PHOS  --  4.3   BUN 18 13   CREATININE 1.1 1.1     LIVER PROFILE:   Recent Labs     10/29/20  0640 10/30/20  0555   AST 15 13*   ALT 10 9*   BILITOT 0.3 0.6   ALKPHOS 54 53     PT/INR: No results for input(s): PROTIME, INR in the last 72 hours. APTT:   Recent Labs     10/29/20  0640 10/29/20  1435   APTT 30.5 154.7*     BNP:  No results for input(s): BNP in the last 72 hours.   TROPONIN: @TROPONINI:3@  Labs, consult, tests reviewed    Assessment/ PLAN:    As above                  Marck Perry MD 10/30/2020 7:52 AM

## 2020-10-30 NOTE — PROGRESS NOTES
Doctor Please copy and paste below in your progress note per DME requirements. Patient was seen in hospital for COPD. I am prescribing oxygen because the diagnosis and testing requires the patient to have oxygen in the home. Conditions will improve or be benefited by oxygen use. The patient is able to perform good mobility and therefore requires the use of a portable oxygen system for ambulation.

## 2020-10-30 NOTE — PROGRESS NOTES
Pt sats falling below 85% on RA at rest & with mild activity. PFT lab notified to set up O2 at home.

## 2020-10-30 NOTE — CARE COORDINATION
CM in to see pt to initiate discharge planning. CM introduced self and role of CM. Pt has discharge order today. Pt and spouse reside together in a 1 story condo. Pt and spouse drive. Pt used no DME PTA. Pt has PCP and insurance to assist with cost of Rxs. CM explained that he had been in hospital in Sept and didn't do much and thinks that may have contributed to his having this PE. Cm offered to contact pt's wife to provide CM name and pH# should any needs arise. Pt declined stating that \"it would scare her\" and stating that \"it's only 1130 in the morning. \" Pt anticipates a discharge home today with no needs.

## 2020-10-30 NOTE — PROGRESS NOTES
Pulmonary and Critical Care  Progress Note    Subjective: The patient has improved. Had EKOS. Shortness of breath has improved  Chest pain none  Addressing respiratory complaints Patient is negative for  hemoptysis and cyanosis  CONSTITUTIONAL:  negative for fevers and chills      Past Medical History:     has a past medical history of Acid reflux, Arthritis, Benign prostatic hyperplasia, BPH (benign prostatic hyperplasia), COPD (chronic obstructive pulmonary disease) ([address] Dizziness, DVT (deep venous thrombosis) ([facility] [address] Enlarged prostate, Essential hypertension, Glaucoma, Gout, [place] (hard of hearing), Hx of blood clots, Hypertension, Impaired glucose tolerance, Pneumonia, Slow urinary stream, Syncope, Teeth missing, and Wears glasses. has a past surgical history that includes eye surgery (Right, 1990's); eye surgery (Left, 2016); Dental surgery; Colonoscopy (2000's); Inguinal hernia repair (Right, 2000); Vasectomy (1984); Abdominal exploration surgery (1984); Inguinal hernia repair (Right, 02/28/2018); and Appendectomy. reports that he quit smoking about 36 years ago. His smoking use included cigarettes and pipe. He started smoking about 65 years ago. He has a 29.00 pack-year smoking history. He has never used smokeless tobacco. He reports current alcohol use of about 2.0 - 3.0 standard drinks of alcohol per week. He reports that he does not use drugs. Family history:  family history includes Dementia in his mother; Diabetes type 2  in his father.     No Known Allergies  Social History:    Reviewed; no changes    Objective:   PHYSICAL EXAM:        VITALS:  /61   Pulse 91   Temp 97.8 °F (36.6 °C) (Oral)   Resp 18   Ht 6' (1.829 m)   Wt 184 lb (83.5 kg)   SpO2 97%   BMI 24.95 kg/m²     24HR INTAKE/OUTPUT:      Intake/Output Summary (Last 24 hours) at 10/30/2020 1148  Last data filed at 10/30/2020 0915  Gross per 24 hour   Intake 1030 ml   Output 950 ml   Net 80 ml       CONSTITUTIONAL:  awake, alert, cooperative, no apparent distress, and appears stated age  LUNGS:  decreased breath sounds  CARDIOVASCULAR:  normal S1 and S2 and negative JVD  ABD:Abdomen soft, non-tender. BS normal. No masses,  No organomegaly  NEURO:Alert and oriented x3. Gait normal. Reflexes and motor strength normal and symmetric. Cranial nerves 2-12 and sensation grossly intact. DATA:    CBC:  Recent Labs     10/29/20  0640 10/30/20  0555   WBC 8.8 9.1   RBC 3.71* 3.59*   HGB 12.6* 12.1*   HCT 36.8* 36.0*    157   MCV 99.2 100.3*   MCH 34.0* 33.7*   MCHC 34.2 33.6   RDW 13.7 13.7   SEGSPCT 66.2* 71.1*      BMP:  Recent Labs     10/29/20  0640 10/30/20  0555   * 138   K 4.6 4.3   CL 98* 103   CO2 20* 24   BUN 18 13   CREATININE 1.1 1.1   CALCIUM 8.7 8.5   GLUCOSE 91 88      ABG:  No results for input(s): PH, PO2ART, CBC4BCN, HCO3, BEART, O2SAT in the last 72 hours. Lab Results   Component Value Date    PROBNP 10,271 (H) 10/29/2020    PROBNP 247.4 09/12/2020    PROBNP 247.8 09/12/2020     No results found for: [facility]    Radiology Review:  Pertinent images / reports were reviewed as a part of this visit. Assessment:     Patient Active Problem List   Diagnosis    Primary osteoarthritis of right wrist    Essential hypertension    Impaired glucose tolerance    COPD (chronic obstructive pulmonary disease) (HCC)    Benign prostatic hyperplasia    Post herpetic neuralgia    Hyponatremia    Arthritis    Acute pulmonary embolism with acute cor pulmonale ([facility] [address]       Plan:   1. Overall the patient has improved. 2. Started on Eliquis. 3. Wean O2.     Carson Gallegos MD  10/30/2020  11:48 AM

## 2020-10-31 ENCOUNTER — CARE COORDINATION (OUTPATIENT)
Dept: CASE MANAGEMENT | Age: 80
End: 2020-10-31

## 2020-10-31 PROCEDURE — 1111F DSCHRG MED/CURRENT MED MERGE: CPT | Performed by: FAMILY MEDICINE

## 2020-10-31 NOTE — CARE COORDINATION
Advance Care Planning  People with COVID-19 may have no symptoms, mild symptoms, such as fever, cough, and shortness of breath or they may have more severe illness, developing severe and fatal pneumonia. As a result, Advance Care Planning with attention to naming a health care decision maker (someone you trust to make healthcare decisions for you if you could not speak for yourself) and sharing other health care preferences is important BEFORE a possible health crisis. Please contact your Primary Care Provider to discuss Advance Care Planning. Preventing the Spread of Coronavirus Disease 2019 in Homes and Residential Communities  For the most recent information go to Rule[employer]    Prevention steps for People with confirmed or suspected COVID-19 (including persons under investigation) who do not need to be hospitalized  and   People with confirmed COVID-19 who were hospitalized and determined to be medically stable to go home    Your healthcare provider and public health staff will evaluate whether you can be cared for at home. If it is determined that you do not need to be hospitalized and can be isolated at home, you will be monitored by staff from your local or state health department. You should follow the prevention steps below until a healthcare provider or local or state health department says you can return to your normal activities. Stay home except to get medical care  People who are mildly ill with COVID-19 are able to isolate at home during their illness. You should restrict activities outside your home, except for getting medical care. Do not go to work, school, or public areas. Avoid using public transportation, ride-sharing, or taxis. Separate yourself from other people and animals in your home  People: As much as possible, you should stay in a specific room and away from other people in your home.  Also, you should use a separate bathroom, if available. Animals: You should restrict contact with pets and other animals while you are sick with COVID-19, just like you would around other people. Although there have not been reports of pets or other animals becoming sick with COVID-19, it is still recommended that people sick with COVID-19 limit contact with animals until more information is known about the virus. When possible, have another member of your household care for your animals while you are sick. If you are sick with COVID-19, avoid contact with your pet, including petting, snuggling, being kissed or licked, and sharing food. If you must care for your pet or be around animals while you are sick, wash your hands before and after you interact with pets and wear a facemask. Call ahead before visiting your doctor  If you have a medical appointment, call the healthcare provider and tell them that you have or may have COVID-19. This will help the healthcare providers office take steps to keep other people from getting infected or exposed. Wear a facemask  You should wear a facemask when you are around other people (e.g., sharing a room or vehicle) or pets and before you enter a healthcare providers office. If you are not able to wear a facemask (for example, because it causes trouble breathing), then people who live with you should not stay in the same room with you, or they should wear a facemask if they enter your room. Cover your coughs and sneezes  Cover your mouth and nose with a tissue when you cough or sneeze. Throw used tissues in a lined trash can. Immediately wash your hands with soap and water for at least 20 seconds or, if soap and water are not available, clean your hands with an alcohol-based hand  that contains at least 60% alcohol.   Clean your hands often  Wash your hands often with soap and water for at least 20 seconds, especially after blowing your nose, coughing, or sneezing; going to the bathroom; and before eating or preparing food. If soap and water are not readily available, use an alcohol-based hand  with at least 60% alcohol, covering all surfaces of your hands and rubbing them together until they feel dry. Soap and water are the best option if hands are visibly dirty. Avoid touching your eyes, nose, and mouth with unwashed hands. Avoid sharing personal household items  You should not share dishes, drinking glasses, cups, eating utensils, towels, or bedding with other people or pets in your home. After using these items, they should be washed thoroughly with soap and water. Clean all high-touch surfaces everyday  High touch surfaces include counters, tabletops, doorknobs, bathroom fixtures, toilets, phones, keyboards, tablets, and bedside tables. Also, clean any surfaces that may have blood, stool, or body fluids on them. Use a household cleaning spray or wipe, according to the label instructions. Labels contain instructions for safe and effective use of the cleaning product including precautions you should take when applying the product, such as wearing gloves and making sure you have good ventilation during use of the product. Monitor your symptoms  Seek prompt medical attention if your illness is worsening (e.g., difficulty breathing). Before seeking care, call your healthcare provider and tell them that you have, or are being evaluated for, COVID-19. Put on a facemask before you enter the facility. These steps will help the healthcare providers office to keep other people in the office or waiting room from getting infected or exposed. Ask your healthcare provider to call the local or state health department. Persons who are placed under active monitoring or facilitated self-monitoring should follow instructions provided by their local health department or occupational health professionals, as appropriate. When working with your local health department check their available hours.   If you have a medical emergency and need to call 911, notify the dispatch personnel that you have, or are being evaluated for COVID-19. If possible, put on a facemask before emergency medical services arrive. Discontinuing home isolation  Patients with confirmed COVID-19 should remain under home isolation precautions until the risk of secondary transmission to others is thought to be low. The decision to discontinue home isolation precautions should be made on a case-by-case basis, in consultation with healthcare providers and [facility] and local health departments. [facility] 45 Transitions Initial Follow Up Call    Call within 2 business days of discharge: Yes    Patient: Kendall Delvalle Patient : 1940   MRN: <[id number]>   Reason for Admission: Pulmonary Embolism  Discharge Date: 10/30/20 RARS: Readmission Risk Score: 23      Last Discharge [facility]       Complaint Diagnosis Description Type Department Provider    10/29/20 Shortness of Breath Acute pulmonary embolism with acute cor pulmonale, unspecified pulmonary embolism type [facility]) ED to Hosp-Admission (Discharged) (ADMITTED) [facility] ICU Shira Toledo MD; Jw Gardner. .. Spoke with: [facility]: [facility]     Non-face-to-face services provided:  Obtained and reviewed discharge summary and/or continuity of care documents  Education of patient/family/caregiver/guardian to support self-management-.   Assessment and support for treatment adherence and medication management-.    Care Transitions 24 Hour Call    Schedule Follow Up Appointment with PCP:  Declined  Do you have any ongoing symptoms?:  No  Do you have a copy of your discharge instructions?:  Yes  Do you have all of your prescriptions and are they filled?:  Yes  Have you been contacted by a [id number] Pharmacist?:  No  Have you scheduled your follow up appointment?:  Yes  How are you going to get to your appointment?:  Car - family or friend to transport  Were you discharged with any Home Care or Post Acute Services:  No  Do you feel like you have everything you need to keep you well at home?:  Yes  Care Transitions Interventions  No Identified Needs         Summary  CTN spoke with patient this am for initial 24 hour discharge follow up CTN/COVID call. Patient states he is doing well, reports no complaints of any nausea, vomiting, fevers, chills, SOB or Cough. No reports of any difficulty with urination, BM's or appetite. CTN and Patient completed 1111f, all medications filled and in home. CTN provided education on s/s that require medical attention and when to seek medical attention. CTN advised Pt of use of urgent care or physicians 24 hr access line if assistance is needed after hours or weekend. Pt denies any needs or concerns and is agreeable with additional calls.     Follow Up  Future Appointments   Date Time Provider Tahir Zamora   11/3/2020 11:00 AM Lovely Aldridge MD [facility]N [facility]   11/6/2020 11:00 AM Chad Lopez MD [facility] PULM [facility]   11/16/2020  3:15 PM Rito Boyer MD [facility] FPS [facility]   2/11/2021  1:00 PM Rito Boyer MD [facility]       Zion Rahman, UYEN

## 2020-11-02 ENCOUNTER — CARE COORDINATION (OUTPATIENT)
Dept: CASE MANAGEMENT | Age: 80
End: 2020-11-02

## 2020-11-05 ENCOUNTER — CARE COORDINATION (OUTPATIENT)
Dept: CASE MANAGEMENT | Age: 80
End: 2020-11-05

## 2020-11-05 NOTE — CARE COORDINATION
Hen[facility] 45 Transitions Follow Up Call    2020    Patient: Gretchen Frazier  Patient : 1940   MRN: [id number]  Reason for Admission: Ac PE, Galileo DVT  Discharge Date: 10/30/20 RARS: Readmission Risk Score: 23    Care Transitions Subsequent and Final Call    Schedule Follow Up Appointment with PCP:  Declined  Subsequent and Final Calls  Do you have any ongoing symptoms?:  No  Have your medications changed?:  No  Do you have any questions related to your medications?:  No  Do you currently have any active services?:  No  Do you have any needs or concerns that I can assist you with?:  No  Identified Barriers: Other  Care Transitions Interventions   Home Care Waiver:  Declined        DME Assistance:  Declined     Senior Services:  Declined    Other Interventions: Follow Up  Future Appointments   Date Time Provider Tahir Zamora   2020 10:45 AM Dimas Childers MD [facility]   2020  3:15 PM Hobert Runner, MD [facility]   2020  1:30 PM Dena York MD [facility]   2021  1:30 PM Shane Rojas MD AFLAD[facility]NPHHTN [facility]   2021  1:00 PM Hobert Runner, MD [facility]   . CARE TRANSITION / V Tati 267 SCREEN: 10/29/2020 Not detected  PATIENT RISK FACTORS: Ac PE, Age, Home O2, COPD    Phone Assessment: Patient reports doing well since discharge. Denies sob, cough, cp, distress; leg pain, erythema, edema, hard knots. Patient has experienced blood clots in the past. Reports O2 sats 95-98% ra. Reports wearing O2 at hs. Advised to monitor O2 sats periodically throughout the day with goal of 90% + RA. Advised to apply O2 prn if noting sob at rest/exertion. Advised to discussed further w/ MD at [facility] pulmonolgy appt. No other sx, needs, concerns voiced. Agreeable to ongoing follow up. Discharge Need Reviewed: Yes, denies resource needs including hhc, dme, community assistance.      Follow Up Appointment Completed?: Seen by Dr Liyah Villatoro 11/3/2020 w/ plan for CMP monthly x 6, f/u 6 months, no rx changes. Has appt w/ Dr Rupert Bean 11/6/2020, Dr Betzy Prasad 11/16/2020; no issues w/ transportation reported. Rx changes or needs: Denies rx changes or needs. Reports taking Eliquis as directed. Stressed importance of taking as directed, not skipping doses, not stopping unless otherwise directed by MD. Reports he received 1st month supply of Eliquis free (see below). [id number] Education Provided on Sx,  Preventative Measures, Flu Clinic?: Yes, verbalizes understanding, denies Covid19 related sx. Risk Factors for Readmission: Medical condition--Ac PE, Galileo DVT, COPD; Age; On O2    Interventions to Address Risk Factors:  1. Advised to contact insurance to determine ongoing Eliquis copay. Discussed benefits of Med Assist, Good Rx, free samples per MD if available. 2. Advised to contact PCP 24/7 re: any health concerns. Upon Next Outreach:  1.  Follow up on Eliquis copay and possible need for Med Assist referral.   2. Follow up on appts w/ Dr Rupert Bean, Dr Meron Frey, RN

## 2020-11-06 ENCOUNTER — INITIAL CONSULT (OUTPATIENT)
Dept: PULMONOLOGY | Age: 80
End: 2020-11-06
Payer: MEDICARE

## 2020-11-06 VITALS
WEIGHT: 185 LBS | HEIGHT: 72 IN | DIASTOLIC BLOOD PRESSURE: 64 MMHG | SYSTOLIC BLOOD PRESSURE: 124 MMHG | BODY MASS INDEX: 25.06 KG/M2 | HEART RATE: 69 BPM | OXYGEN SATURATION: 94 %

## 2020-11-06 PROBLEM — Z87.891 EX-SMOKER: Status: ACTIVE | Noted: 2020-11-06

## 2020-11-06 PROBLEM — G47.33 OSA (OBSTRUCTIVE SLEEP APNEA): Status: ACTIVE | Noted: 2020-11-06

## 2020-11-06 PROBLEM — G47.10 HYPERSOMNIA: Status: ACTIVE | Noted: 2020-11-06

## 2020-11-06 LAB
EKG ATRIAL RATE: 88 BPM
EKG DIAGNOSIS: NORMAL
EKG P AXIS: 61 DEGREES
EKG P-R INTERVAL: 186 MS
EKG Q-T INTERVAL: 412 MS
EKG QRS DURATION: 126 MS
EKG QTC CALCULATION (BAZETT): 498 MS
EKG R AXIS: 91 DEGREES
EKG T AXIS: -42 DEGREES
EKG VENTRICULAR RATE: 88 BPM

## 2020-11-06 PROCEDURE — 1036F TOBACCO NON-USER: CPT | Performed by: INTERNAL MEDICINE

## 2020-11-06 PROCEDURE — 4040F PNEUMOC VAC/ADMIN/RCVD: CPT | Performed by: INTERNAL MEDICINE

## 2020-11-06 PROCEDURE — 99204 OFFICE O/P NEW MOD 45 MIN: CPT | Performed by: INTERNAL MEDICINE

## 2020-11-06 PROCEDURE — G8484 FLU IMMUNIZE NO ADMIN: HCPCS | Performed by: INTERNAL MEDICINE

## 2020-11-06 PROCEDURE — G8427 DOCREV CUR MEDS BY ELIG CLIN: HCPCS | Performed by: INTERNAL MEDICINE

## 2020-11-06 PROCEDURE — 1111F DSCHRG MED/CURRENT MED MERGE: CPT | Performed by: INTERNAL MEDICINE

## 2020-11-06 PROCEDURE — G8417 CALC BMI ABV UP PARAM F/U: HCPCS | Performed by: INTERNAL MEDICINE

## 2020-11-06 PROCEDURE — 1123F ACP DISCUSS/DSCN MKR DOCD: CPT | Performed by: INTERNAL MEDICINE

## 2020-11-06 ASSESSMENT — SLEEP AND FATIGUE QUESTIONNAIRES
HOW LIKELY ARE YOU TO NOD OFF OR FALL ASLEEP WHILE SITTING AND READING: 0
HOW LIKELY ARE YOU TO NOD OFF OR FALL ASLEEP WHILE SITTING QUIETLY AFTER LUNCH WITHOUT ALCOHOL: 0
HOW LIKELY ARE YOU TO NOD OFF OR FALL ASLEEP WHEN YOU ARE A PASSENGER IN A CAR FOR AN HOUR WITHOUT A BREAK: 0
HOW LIKELY ARE YOU TO NOD OFF OR FALL ASLEEP IN A CAR, WHILE STOPPED FOR A FEW MINUTES IN TRAFFIC: 0
HOW LIKELY ARE YOU TO NOD OFF OR FALL ASLEEP WHILE SITTING INACTIVE IN A PUBLIC PLACE: 0
HOW LIKELY ARE YOU TO NOD OFF OR FALL ASLEEP WHILE SITTING AND TALKING TO SOMEONE: 0
NECK CIRCUMFERENCE (INCHES): 18
HOW LIKELY ARE YOU TO NOD OFF OR FALL ASLEEP WHILE LYING DOWN TO REST IN THE AFTERNOON WHEN CIRCUMSTANCES PERMIT: 2
HOW LIKELY ARE YOU TO NOD OFF OR FALL ASLEEP WHILE WATCHING TV: 1
ESS TOTAL SCORE: 3

## 2020-11-06 ASSESSMENT — ENCOUNTER SYMPTOMS
EYE ITCHING: 0
SHORTNESS OF BREATH: 0
ABDOMINAL PAIN: 0
EYE DISCHARGE: 0
BACK PAIN: 0
COUGH: 0
ABDOMINAL DISTENTION: 0

## 2020-11-06 NOTE — ASSESSMENT & PLAN NOTE
He has symptoms of ANURAG  Advised to go for the sleep study  Sleep Hygeine measures  Avoid back sleep till sleep study  Driving precautions  Medical consequences of untreated ANURAG

## 2020-11-06 NOTE — PROGRESS NOTES
Gretchen Frazier  1940  Referring Provider: Dr. Carlo Strickland    Subjective:     Chief Complaint   Patient presents with    New Patient     Consult- Ref for sleep apnea       HPI  Kd Moeller is a 79 y.o. male has been referred for the ANURAG. He has gained no weight in the last 10 years. He goes to bed at 11 PM and it takes about few mins to fall asleep. He snores and stops breathing as per his wife. He wakes up about 2 times in the night to go to the bathroom. It takes about few mins to fall back to sleep. He never wokeup choking or gasping for air, woke up with dry mouth, no palpitations. He has no RLS. He gets up at 8 AM and he is not tired. He has no morning headaches. He less sleepy during the day time. He has no sleep paralysis, no cataplexy, no hypnogogic or hypnopompic hallucinations. He has no depression or anxiety. He has h/o smoking 1pk per day for about 23 years which he quit when he was 35years old. He is not a shift worker. Current Outpatient Medications   Medication Sig Dispense Refill    OXYGEN Inhale 2 L into the lungs nightly      celecoxib (CELEBREX) 400 MG capsule Take 400 mg by mouth daily      TIMOLOL MALEATE OP Apply to eye      latanoprost (XALATAN) 0.005 % ophthalmic solution 1 drop nightly      aspirin 81 MG chewable tablet Take 1 tablet by mouth daily 30 tablet 3    apixaban (ELIQUIS DVT/PE STARTER PACK) 5 MG TABS tablet Take 10 mg (2 tablets) orally twice daily for 7 days, then take 5 mg (1 tablet) orally twice daily thereafter.  74 tablet 0    amLODIPine (NORVASC) 5 MG tablet Take 1 tablet by mouth daily 30 tablet 3    azelastine (ASTELIN) 0.1 % nasal spray 1 spray by Nasal route 2 times daily Use in each nostril as directed      hydroxychloroquine (PLAQUENIL) 200 MG tablet Take 200 mg by mouth daily      lisinopril (PRINIVIL;ZESTRIL) 10 MG tablet Take 10 mg by mouth daily      vitamin B-1 100 MG tablet Take 1 tablet by mouth daily 30 tablet 3    zoster recombinant adjuvanted vaccine [facility]) 50 MCG/0.5ML SUSR injection 50 MCG IM then repeat 2-6 months. 0.5 mL 1    finasteride (PROSCAR) 5 MG tablet Take 1 tablet by mouth daily 90 tablet 1    allopurinol (ZYLOPRIM) 100 MG tablet Take 1 tablet by mouth daily 90 tablet 1    ADVAIR DISKUS 250-50 MCG/DOSE AEPB Inhale 1 puff into the lungs 2 times daily 3 Inhaler 1    lansoprazole (PREVACID) 30 MG delayed release capsule Take 30 mg by mouth daily      predniSONE (DELTASONE) 5 MG tablet Take 5 mg by mouth daily        No current facility-administered medications for this visit.         No Known Allergies    Past Medical History:   Diagnosis Date    Acid reflux     Arthritis     \"Wrists\"    Benign prostatic hyperplasia     without outflow obstruction    BPH (benign prostatic hyperplasia)     COPD (chronic obstructive pulmonary disease) ([facility])     Dizziness     DVT (deep venous thrombosis) ([facility])     Right Leg In Late 1980's, Right Leg In 2002    Enlarged prostate     Essential hypertension     Glaucoma     Bilateral Eyes    Gout     [place] (hard of hearing)     Bilateral Hearing Aids    Hx of blood clots Dx Late 1980's And 2002    \"DVT Right Leg\"    Hypertension     Impaired glucose tolerance     Pneumonia 5/15/2018    Slow urinary stream     Syncope     with loss of consciousness    Teeth missing     Upper And Lower    Wears glasses        Past Surgical History:   Procedure Laterality Date    ABDOMINAL EXPLORATION SURGERY  1984    \"Intestines Were Tangled, Had Salmonella Poisoning\"    APPENDECTOMY      COLONOSCOPY  2000's    DENTAL SURGERY      Teeth Extracted In Past    EYE SURGERY Right 1990's    Cataract With Lens Implant    EYE SURGERY Left 2016    Cataract With Lens Implant    INGUINAL HERNIA REPAIR Right 2000    With Mesh    INGUINAL HERNIA REPAIR Right 02/28/2018    With mesh and plug    VASECTOMY  1984       Social History     Socioeconomic History    Marital status:      Spouse name: None    Number of children: None    Years of education: None    Highest education level: None   Occupational History    None   Social Needs    Financial resource strain: None    Food insecurity     Worry: None     Inability: None    Transportation needs     Medical: None     Non-medical: None   Tobacco Use    Smoking status: Former Smoker     Packs/day: 1.00     Years: 29.00     Pack years: 29.00     Types: Cigarettes, Pipe     Start date:      Last attempt to quit: 1984     Years since quittin.8    Smokeless tobacco: Never Used   Substance and Sexual Activity    Alcohol use: Yes     Alcohol/week: 2.0 - 3.0 standard drinks     Types: 2 - 3 Glasses of wine per week     Comment: \"2 Glasses Of Wine Daily\"    Drug use: No    Sexual activity: Not Currently   Lifestyle    Physical activity     Days per week: None     Minutes per session: None    Stress: None   Relationships    Social connections     Talks on phone: None     Gets together: None     Attends [employer] service: None     Active member of club or organization: None     Attends meetings of clubs or organizations: None     Relationship status: None    Intimate partner violence     Fear of current or ex partner: None     Emotionally abused: None     Physically abused: None     Forced sexual activity: None   Other Topics Concern    None   Social History Narrative    None       Review of Systems   Constitutional: Positive for fatigue. HENT: Negative for congestion and postnasal drip. Eyes: Negative for discharge and itching. Respiratory: Negative for cough and shortness of breath. Cardiovascular: Negative for chest pain and leg swelling. Gastrointestinal: Negative for abdominal distention and abdominal pain. Endocrine: Negative for cold intolerance and heat intolerance. Genitourinary: Negative for enuresis and flank pain. Musculoskeletal: Negative for arthralgias and back pain.    Allergic/Immunologic: Negative for environmental allergies and food allergies. Neurological: Negative for light-headedness and headaches. Hematological: Negative for adenopathy. Psychiatric/Behavioral: Negative for agitation and behavioral problems. Objective:   /64   Pulse 69   Ht 6' (1.829 m)   Wt 185 lb (83.9 kg)   SpO2 94%   BMI 25.09 kg/m²   Body mass index is 25.09 kg/m². Sleep Medicine 11/6/2020   Sitting and reading 0   Watching TV 1   Sitting, inactive in a public place (e.g. a theatre or a meeting) 0   As a passenger in a car for an hour without a break 0   Lying down to rest in the afternoon when circumstances permit 2   Sitting and talking to someone 0   Sitting quietly after a lunch without alcohol 0   In a car, while stopped for a few minutes in traffic 0   Total score 3   Neck circumference 18     {MALLAMPATI:3    Physical Exam  Vitals signs reviewed. Constitutional:       Appearance: Normal appearance. HENT:      Head: Normocephalic and atraumatic. Nose: Nose normal.      Mouth/Throat:      Mouth: Mucous membranes are moist.   Eyes:      Extraocular Movements: Extraocular movements intact. Pupils: Pupils are equal, round, and reactive to light. Neck:      Musculoskeletal: Normal range of motion and neck supple. Cardiovascular:      Rate and Rhythm: Normal rate and regular rhythm. Pulses: Normal pulses. Heart sounds: Normal heart sounds. Pulmonary:      Effort: Pulmonary effort is normal.      Breath sounds: Normal breath sounds. Abdominal:      General: Abdomen is flat. Palpations: Abdomen is soft. Musculoskeletal: Normal range of motion. Skin:     General: Skin is warm and dry. Neurological:      General: No focal deficit present. Mental Status: He is alert and oriented to person, place, and time.    Psychiatric:         Mood and Affect: Mood normal.         Behavior: Behavior normal.         Radiology: None    Assessment and Plan     Problem List        Pulmonary Problems ANURAG (obstructive sleep apnea)     He has symptoms of ANURAG  Advised to go for the sleep study  Sleep Hygeine measures  Avoid back sleep till sleep study  Driving precautions  Medical consequences of untreated ANURAG         Relevant Orders    Home Sleep Study       Other    Hypersomnia     Advised to go for the sleep study           Ex-smoker     Advised to quit smoking                    Return if symptoms worsen or fail to improve, for Patient wants to follow up with Dr. Nico Rosales.      Progress notes sent to the referring Provider    Jessica An MD  11/6/2020  11:08 AM

## 2020-11-18 ENCOUNTER — OFFICE VISIT (OUTPATIENT)
Dept: CARDIOLOGY CLINIC | Age: 80
End: 2020-11-18
Payer: MEDICARE

## 2020-11-18 VITALS
BODY MASS INDEX: 25.17 KG/M2 | HEART RATE: 88 BPM | WEIGHT: 185.8 LBS | DIASTOLIC BLOOD PRESSURE: 58 MMHG | SYSTOLIC BLOOD PRESSURE: 100 MMHG | HEIGHT: 72 IN

## 2020-11-18 PROCEDURE — G8484 FLU IMMUNIZE NO ADMIN: HCPCS | Performed by: INTERNAL MEDICINE

## 2020-11-18 PROCEDURE — 1123F ACP DISCUSS/DSCN MKR DOCD: CPT | Performed by: INTERNAL MEDICINE

## 2020-11-18 PROCEDURE — 4040F PNEUMOC VAC/ADMIN/RCVD: CPT | Performed by: INTERNAL MEDICINE

## 2020-11-18 PROCEDURE — 99213 OFFICE O/P EST LOW 20 MIN: CPT | Performed by: INTERNAL MEDICINE

## 2020-11-18 PROCEDURE — 1111F DSCHRG MED/CURRENT MED MERGE: CPT | Performed by: INTERNAL MEDICINE

## 2020-11-18 PROCEDURE — G8417 CALC BMI ABV UP PARAM F/U: HCPCS | Performed by: INTERNAL MEDICINE

## 2020-11-18 PROCEDURE — G8427 DOCREV CUR MEDS BY ELIG CLIN: HCPCS | Performed by: INTERNAL MEDICINE

## 2020-11-18 PROCEDURE — 1036F TOBACCO NON-USER: CPT | Performed by: INTERNAL MEDICINE

## 2020-11-18 NOTE — PATIENT INSTRUCTIONS
Please hold on to these instructions the  will call you within 1-9 business days when we receive authorization from your insurance. Echocardiogram    WHAT TO EXPECT:   ? This test will take approximately 45 minutes. ? It is an ultrasound of the heart. ? It can look at the valves and chambers inside the heart   ? There is no special instructions for this test.     If you are unable to keep this appointment, please notify us 24 hours prior to test at [phone]. Please be informed that if you contact our office outside of normal business hours the physician on call cannot help with any scheduling or rescheduling issues, procedure instruction questions or any type of medication issue. We advise you for any urgent/emergency that you go to the nearest emergency room!     PLEASE CALL OUR OFFICE DURING NORMAL BUSINESS HOURS    Monday - Friday   8 am to 5 pm    [address]: [phone]    [place]:  [phone]

## 2020-11-18 NOTE — PROGRESS NOTES
CARDIOLOGY NOTE      11/18/2020    RE: Lizy Bedolla  (1940)                               TO:  Dr. Roberto Carlos Burgos MD            Harshal Tracy is a 79 y.o. male who was seen today for management of  PE                                    HPI:                   The patient does not have cardiac complaints  Patient also seen  for    - PE  Had EKOS  - DVT on NOAC    Lizy Bedolla has the following history recorded in care path:  Patient Active Problem List    Diagnosis Date Noted    ANURAG (obstructive sleep apnea) 11/06/2020    Hypersomnia 11/06/2020    Ex-smoker 11/06/2020    Acute pulmonary embolism with acute cor pulmonale ([facility] [address] 10/29/2020    Arthritis 09/13/2020    Hyponatremia 09/10/2020    Post herpetic neuralgia 02/27/2020    Impaired glucose tolerance 06/14/2019    COPD (chronic obstructive pulmonary disease) ([facility] 06/14/2019    Benign prostatic hyperplasia 06/14/2019    Essential hypertension     Primary osteoarthritis of right wrist 03/09/2016     Current Outpatient Medications   Medication Sig Dispense Refill    apixaban (ELIQUIS) 5 MG TABS tablet Take 1 tablet by mouth 2 times daily 60 tablet 5    OXYGEN Inhale 2 L into the lungs nightly      celecoxib (CELEBREX) 400 MG capsule Take 400 mg by mouth daily      TIMOLOL MALEATE OP Apply to eye      latanoprost (XALATAN) 0.005 % ophthalmic solution 1 drop nightly      aspirin 81 MG chewable tablet Take 1 tablet by mouth daily 30 tablet 3    apixaban (ELIQUIS DVT/PE STARTER PACK) 5 MG TABS tablet Take 10 mg (2 tablets) orally twice daily for 7 days, then take 5 mg (1 tablet) orally twice daily thereafter.  74 tablet 0    amLODIPine (NORVASC) 5 MG tablet Take 1 tablet by mouth daily 30 tablet 3    azelastine (ASTELIN) 0.1 % nasal spray 1 spray by Nasal route 2 times daily Use in each nostril as directed      hydroxychloroquine (PLAQUENIL) 200 MG tablet Take 200 mg by mouth daily      lisinopril (PRINIVIL;ZESTRIL) 10 MG tablet Take 10 mg by mouth daily      vitamin B-1 100 MG tablet Take 1 tablet by mouth daily 30 tablet 3    finasteride (PROSCAR) 5 MG tablet Take 1 tablet by mouth daily 90 tablet 1    allopurinol (ZYLOPRIM) 100 MG tablet Take 1 tablet by mouth daily 90 tablet 1    ADVAIR DISKUS 250-50 MCG/DOSE AEPB Inhale 1 puff into the lungs 2 times daily 3 Inhaler 1    lansoprazole (PREVACID) 30 MG delayed release capsule Take 30 mg by mouth daily      predniSONE (DELTASONE) 5 MG tablet Take 5 mg by mouth daily       zoster recombinant adjuvanted vaccine (SHINGRIX) 50 MCG/0.5ML SUSR injection 50 MCG IM then repeat 2-6 months. 0.5 mL 1     No current facility-administered medications for this visit. Allergies: Patient has no known allergies.   Past Medical History:   Diagnosis Date    Acid reflux     Arthritis     \"Wrists\"    Benign prostatic hyperplasia     without outflow obstruction    BPH (benign prostatic hyperplasia)     [facility]     Dizziness     DVT (deep venous thrombosis) ([facility])     Right Leg In Late 1980's, Right Leg In 2002    Enlarged prostate     Essential hypertension     Glaucoma     Bilateral Eyes    Gout     [place] (hard of hearing)     Bilateral Hearing Aids    Hx of blood clots Dx Late 1980's And 2002    \"DVT Right Leg\"    Hypertension     Impaired glucose tolerance     Pneumonia 5/15/2018    Slow urinary stream     Syncope     with loss of consciousness    Teeth missing     Upper And Lower    Wears glasses      Past Surgical History:   Procedure Laterality Date    ABDOMINAL EXPLORATION SURGERY  1984    \"Intestines Were Tangled, Had Salmonella Poisoning\"    APPENDECTOMY      COLONOSCOPY  2000's    DENTAL SURGERY      Teeth Extracted In Past    EYE SURGERY Right 1990's    Cataract With Lens Implant    EYE SURGERY Left 2016    Cataract With Lens Implant    INGUINAL HERNIA REPAIR Right 2000    With Mesh    INGUINAL HERNIA REPAIR Right 2018    With mesh and plug    VASECTOMY  1984      As reviewed   Family History   Problem Relation Age of Onset    Dementia Mother     Diabetes type 2  Father      Social History     Tobacco Use    Smoking status: Former Smoker     Packs/day: 1.00     Years: 29.00     Pack years: 29.00     Types: Cigarettes, Pipe     Start date:      Last attempt to quit:      Years since quittin.9    Smokeless tobacco: Never Used   Substance Use Topics    Alcohol use: Yes     Alcohol/week: 2.0 - 3.0 standard drinks     Types: 2 - 3 Glasses of wine per week     Comment: \"2 Glasses Of Wine Daily\"      Review of Systems:    Constitutional: Negative for diaphoresis and fatigue  Psychological:Negative for anxiety or depression  HENT: Negative for headaches, nasal congestion, sinus pain or vertigo  Eyes: Negative for visual disturbance. Endocrine: Negative for polydipsia/polyuria  Respiratory: Negative for shortness of breath  Cardiovascular: Negative for chest pain, dyspnea on exertion, claudication, edema, irregular heartbeat, murmur, palpitations or shortness of breath  Gastrointestinal: Negative for abdominal pain or heartburn  Genito-Urinary: Negative for urinary frequency/urgency  Musculoskeletal: Negative for muscle pain, muscular weakness, negative for pain in arm and leg or swelling in foot and leg  Neurological: Negative for dizziness, headaches, memory loss, numbness/tingling, visual changes, syncope  Dermatological: Negative for rash    Objective:    Vitals:    20 1337   BP: (!) 100/58   Site: Left Upper Arm   Position: Sitting   Cuff Size: Medium Adult   Pulse: 88   Weight: 185 lb 12.8 oz (84.3 kg)   Height: 6' (1.829 m)     BP (!) 100/58 (Site: Left Upper Arm, Position: Sitting, Cuff Size: Medium Adult)   Pulse 88   Ht 6' (1.829 m)   Wt 185 lb 12.8 oz (84.3 kg)   BMI 25.20 kg/m²     No flowsheet data found.      Wt Readings from Last 3 Encounters:   20 2.200 09/10/2020         Impression:    No diagnosis found. Patient Active Problem List   Diagnosis Code    Primary osteoarthritis of right wrist M19.031    Essential hypertension I10    Impaired glucose tolerance R73.02    COPD (chronic obstructive pulmonary disease) ([facility]) J44.9    Benign prostatic hyperplasia N40.0    Post herpetic neuralgia B02.29    Hyponatremia E87.1    Arthritis M19.90    Acute pulmonary embolism with acute cor pulmonale ([facility]) I26.09    ANURAG (obstructive sleep apnea) G47.33    Hypersomnia G47.10    Ex-smoker Z87.891       Assessment & Plan:    - PE: On eliquism reecho  - DVT on NOAC  .  CPM    - CKD stable    Patito Santana MD    [facility] [place]

## 2020-11-18 NOTE — LETTER
Chago Terry  1940  [id number]    Have you had any Chest Pain that is not new? - No     Have you had any Shortness of Breath - Yes  If Yes - When on exertion    Have you had any dizziness - No       Have you had any palpitations that are not new? - No       Is the patient on any of the following medications - NONE  If Yes DO EKG - Needs done every 6 months    Do you have any edema - swelling in NONE    If Yes - CHECK TO SEE IF THE EDEMA IS PITTING    Do you have a surgery or procedure scheduled in the near future - No  If Yes- DO EKG      ? Ask patient if they want to sign up for MyChart if they are not already signed up    ? Check to see if we have an E-MAIL on file for the patient    ? Check medication list thoroughly!!! AND RECONCILE OUTSIDE MEDICATIONS  If dose has changed change the entire order not just the MG  BE SURE TO ASK PATIENT IF THEY NEED MEDICATION REFILLS    ?  At check out add to every patient's \"wrap up\" the following dot phrase AFTERHOURSEDUCATION and ensure we explain this to our patients

## 2020-11-19 ENCOUNTER — CARE COORDINATION (OUTPATIENT)
Dept: CASE MANAGEMENT | Age: 80
End: 2020-11-19

## 2020-11-19 NOTE — CARE COORDINATION
[facility] Transitions Follow Up Call    2020    Patient: Kiya Robin  Patient : 1940   MRN: [id number]  Reason for Admission: Ac PE, [place] DVT  Discharge Date: 10/30/20 RARS: Readmission Risk Score: 23    Care Transitions Subsequent and Final Call    Schedule Follow Up Appointment with PCP:  Declined  Subsequent and Final Calls  Do you have any ongoing symptoms?:  No  Have your medications changed?:  No  Do you have any questions related to your medications?:  No  Do you currently have any active services?:  No  Do you have any needs or concerns that I can assist you with?:  No  Identified Barriers: Other  Care Transitions Interventions  No Identified Needs   Home Care Waiver:  Declined        DME Assistance:  Declined     Senior Services:  Declined    Other Interventions: Follow Up  Future Appointments   Date Time Provider Tahir Zamora   2020 11:00 AM SCHEDULE, [facility]ASCULAR [facility]   2021  2:00 PM Jessy Austin MD [facility] MRANGINW [facility]   2021  1:30 PM Gina Daniel MD [facility]   2021  1:00 PM Maynor Leslie MD [facility]   2021  2:20 PM B[facility] Councilman[facility]   . CARE TRANSITION / [address] SCREEN: 10/29/2020 Not detected  PATIENT RISK FACTORS: Ac PE, Age, Home O2, COPD    Spoke w/ Patient for CT/Covid 19 Risk Monitoring follow up call. Patient reports \"doing just fine\". Denies sob, cp, cough, le edema/claudication/erythema. Reports taking Eliquis as directed and denies issue w/ cost of rx copay. Has home sleep study scheduled per Dr Kathyanne Collet. Denies resource needs including hhc, dme, community assistance and has transportation for appts.  Patient has been provided the following resources and education related to COVID-19:                       Signs, symptoms and red flags related to COVID-19            CDC exposure and quarantine guidelines            [employer]

## 2020-11-30 ENCOUNTER — PROCEDURE VISIT (OUTPATIENT)
Dept: CARDIOLOGY CLINIC | Age: 80
End: 2020-11-30
Payer: MEDICARE

## 2020-11-30 PROCEDURE — 93308 TTE F-UP OR LMTD: CPT | Performed by: INTERNAL MEDICINE

## 2020-12-02 ENCOUNTER — TELEPHONE (OUTPATIENT)
Dept: CARDIOLOGY CLINIC | Age: 80
End: 2020-12-02

## 2021-02-19 ENCOUNTER — OFFICE VISIT (OUTPATIENT)
Dept: FAMILY MEDICINE CLINIC | Age: 81
End: 2021-02-19
Payer: MEDICARE

## 2021-02-19 VITALS
HEIGHT: 72 IN | OXYGEN SATURATION: 98 % | WEIGHT: 192.3 LBS | HEART RATE: 61 BPM | BODY MASS INDEX: 26.05 KG/M2 | TEMPERATURE: 97.2 F | SYSTOLIC BLOOD PRESSURE: 142 MMHG | DIASTOLIC BLOOD PRESSURE: 72 MMHG

## 2021-02-19 DIAGNOSIS — N40.0 BENIGN PROSTATIC HYPERPLASIA WITHOUT LOWER URINARY TRACT SYMPTOMS: ICD-10-CM

## 2021-02-19 DIAGNOSIS — I10 ESSENTIAL HYPERTENSION: ICD-10-CM

## 2021-02-19 DIAGNOSIS — R73.02 IMPAIRED GLUCOSE TOLERANCE: ICD-10-CM

## 2021-02-19 DIAGNOSIS — J44.9 CHRONIC OBSTRUCTIVE PULMONARY DISEASE, UNSPECIFIED COPD TYPE (HCC): ICD-10-CM

## 2021-02-19 DIAGNOSIS — I26.09 OTHER ACUTE PULMONARY EMBOLISM WITH ACUTE COR PULMONALE (HCC): Primary | ICD-10-CM

## 2021-02-19 DIAGNOSIS — M19.90 OSTEOARTHRITIS, UNSPECIFIED OSTEOARTHRITIS TYPE, UNSPECIFIED SITE: ICD-10-CM

## 2021-02-19 PROCEDURE — 4040F PNEUMOC VAC/ADMIN/RCVD: CPT | Performed by: FAMILY MEDICINE

## 2021-02-19 PROCEDURE — 3023F SPIROM DOC REV: CPT | Performed by: FAMILY MEDICINE

## 2021-02-19 PROCEDURE — G8417 CALC BMI ABV UP PARAM F/U: HCPCS | Performed by: FAMILY MEDICINE

## 2021-02-19 PROCEDURE — 99214 OFFICE O/P EST MOD 30 MIN: CPT | Performed by: FAMILY MEDICINE

## 2021-02-19 PROCEDURE — G8427 DOCREV CUR MEDS BY ELIG CLIN: HCPCS | Performed by: FAMILY MEDICINE

## 2021-02-19 PROCEDURE — 1036F TOBACCO NON-USER: CPT | Performed by: FAMILY MEDICINE

## 2021-02-19 PROCEDURE — G8926 SPIRO NO PERF OR DOC: HCPCS | Performed by: FAMILY MEDICINE

## 2021-02-19 PROCEDURE — G8484 FLU IMMUNIZE NO ADMIN: HCPCS | Performed by: FAMILY MEDICINE

## 2021-02-19 PROCEDURE — 1123F ACP DISCUSS/DSCN MKR DOCD: CPT | Performed by: FAMILY MEDICINE

## 2021-02-19 RX ORDER — AMLODIPINE BESYLATE 5 MG/1
5 TABLET ORAL DAILY
Qty: 90 TABLET | Refills: 1 | Status: SHIPPED | OUTPATIENT
Start: 2021-02-19

## 2021-02-19 RX ORDER — ALLOPURINOL 100 MG/1
100 TABLET ORAL DAILY
Qty: 90 TABLET | Refills: 1 | Status: SHIPPED | OUTPATIENT
Start: 2021-02-19

## 2021-02-19 RX ORDER — LISINOPRIL 10 MG/1
10 TABLET ORAL DAILY
Qty: 90 TABLET | Refills: 1 | Status: SHIPPED | OUTPATIENT
Start: 2021-02-19 | End: 2021-02-19 | Stop reason: DRUGHIGH

## 2021-02-19 RX ORDER — LISINOPRIL 20 MG/1
20 TABLET ORAL DAILY
Qty: 90 TABLET | Refills: 1 | Status: SHIPPED | OUTPATIENT
Start: 2021-02-19 | End: 2021-04-26 | Stop reason: SDUPTHER

## 2021-02-19 RX ORDER — TIMOLOL MALEATE 5 MG/ML
SOLUTION/ DROPS OPHTHALMIC
COMMUNITY
Start: 2021-02-02 | End: 2021-06-22

## 2021-02-19 RX ORDER — FINASTERIDE 5 MG/1
5 TABLET, FILM COATED ORAL DAILY
Qty: 90 TABLET | Refills: 1 | Status: SHIPPED | OUTPATIENT
Start: 2021-02-19

## 2021-02-19 ASSESSMENT — PATIENT HEALTH QUESTIONNAIRE - PHQ9
SUM OF ALL RESPONSES TO PHQ QUESTIONS 1-9: 0
SUM OF ALL RESPONSES TO PHQ9 QUESTIONS 1 & 2: 0

## 2021-02-19 NOTE — PROGRESS NOTES
2021     Geoff Carroll      Chief Complaint   Patient presents with    6 Month Follow-Up     pt states that he is still having pain from his shingles from 3 years ago . pt is going to the pain center in [place] [facility] .  Hypertension     pt states that his BP has been high 170 in the morning . pt wants to discuss the celebrex . ESDRAS Bravo is a 79 y.o. male who presents today with the followin. Other acute pulmonary embolism with acute cor pulmonale ([facility] [address]    2. Benign prostatic hyperplasia without lower urinary tract symptoms    3. Osteoarthritis, unspecified osteoarthritis type, unspecified site    4. Chronic obstructive pulmonary disease, unspecified COPD type ([address]    5. Impaired glucose tolerance    6. Essential hypertension    Here to review several things  The patient had pulmonary emboli within the past year is anticoagulated with Eliquis  I think at the time of the emboli was told to take 181 mg aspirin twice daily he is never had stroke he has never had documented coronary artery disease he is asking whether this is necessary    REVIEW OF SYMPTOMS    Review of Systems   Constitutional: Negative for activity change and unexpected weight change. Respiratory:        Stable COPD symptoms it really does not limit his activities   Cardiovascular:        History of hypertension not well controlled he has had systolics as high as 350 periodically  He has cut back on his alcohol intake   Endocrine:        History of impaired fasting glucose is try to watch his diet   Musculoskeletal:        Has combination of osteoarthritis and previous history of inflammatory arthritis possibly rheumatoid  He is seeing a rheumatologist he is on Celebrex  He is on prednisone 5 mg twice daily symptoms are fairly well controlled   Hematological:        Patient will be anticoagulated long-term because of recurrent DVT and pulmonary emboli   Psychiatric/Behavioral: Negative.         PAST MEDICAL HISTORY  Past Medical History:   Diagnosis Date    Acid reflux     Arthritis     \"Wrists\"    Benign prostatic hyperplasia     without outflow obstruction    BPH (benign prostatic hyperplasia)     [facility]     Dizziness     DVT (deep venous thrombosis) ([facility])     Right Leg In Late , Right Leg In     Enlarged prostate     Essential hypertension     Glaucoma     Bilateral Eyes    Gout     H/O echocardiogram Limited 2020    EF 55-60%, Mild TR & PHTN.    [place] (hard of hearing)     Bilateral Hearing Aids    Hx of blood clots Dx Late  And     \"DVT Right Leg\"    Hypertension     Impaired glucose tolerance     Pneumonia 5/15/2018    Slow urinary stream     Syncope     with loss of consciousness    Teeth missing     Upper And Lower    Wears glasses        FAMILY HISTORY  Family History   Problem Relation Age of Onset    Dementia Mother     Diabetes type 2  Father        SOCIAL HISTORY  Social History     Socioeconomic History    Marital status:      Spouse name: None    Number of children: None    Years of education: None    Highest education level: None   Occupational History    None   Social Needs    Financial resource strain: None    Food insecurity     Worry: None     Inability: None    Transportation needs     Medical: None     Non-medical: None   Tobacco Use    Smoking status: Former Smoker     Packs/day: 1.00     Years: 29.00     Pack years: 29.00     Types: Cigarettes, Pipe     Start date:      Quit date:      Years since quittin.2    Smokeless tobacco: Never Used   Substance and Sexual Activity    Alcohol use:  Yes     Alcohol/week: 2.0 - 3.0 standard drinks     Types: 2 - 3 Glasses of wine per week     Comment: \"2 Glasses Of Wine Daily\"    Drug use: No    Sexual activity: Not Currently   Lifestyle    Physical activity     Days per week: None     Minutes per session: None    Stress: None   Relationships    Social connections     Talks on phone: None     Gets together: None     Attends Sabianist service: None     Active member of club or organization: None     Attends meetings of clubs or organizations: None     Relationship status: None    Intimate partner violence     Fear of current or ex partner: None     Emotionally abused: None     Physically abused: None     Forced sexual activity: None   Other Topics Concern    None   Social History Narrative    None        SURGICAL HISTORY  Past Surgical History:   Procedure Laterality Date    ABDOMINAL EXPLORATION SURGERY  1984    \"Intestines Were Tangled, Had Salmonella Poisoning\"    APPENDECTOMY      COLONOSCOPY  2000's    DENTAL SURGERY      Teeth Extracted In Past    EYE SURGERY Right 1990's    Cataract With Lens Implant    EYE SURGERY Left 2016    Cataract With Lens Implant    INGUINAL HERNIA REPAIR Right 2000    With Mesh    INGUINAL HERNIA REPAIR Right 02/28/2018    With mesh and plug    VASECTOMY  1984       CURRENT MEDICATIONS  Current Outpatient Medications   Medication Sig Dispense Refill    finasteride (PROSCAR) 5 MG tablet Take 1 tablet by mouth daily 90 tablet 1    amLODIPine (NORVASC) 5 MG tablet Take 1 tablet by mouth daily 90 tablet 1    allopurinol (ZYLOPRIM) 100 MG tablet Take 1 tablet by mouth daily 90 tablet 1    ADVAIR DISKUS 250-50 MCG/DOSE AEPB Inhale 1 puff into the lungs 2 times daily 3 Inhaler 1    lisinopril (PRINIVIL;ZESTRIL) 20 MG tablet Take 1 tablet by mouth daily 90 tablet 1    apixaban (ELIQUIS) 5 MG TABS tablet Take 1 tablet by mouth 2 times daily 60 tablet 5    OXYGEN Inhale 2 L into the lungs nightly      celecoxib (CELEBREX) 400 MG capsule Take 400 mg by mouth daily      TIMOLOL MALEATE OP Apply to eye      latanoprost (XALATAN) 0.005 % ophthalmic solution 1 drop nightly      aspirin 81 MG chewable tablet Take 1 tablet by mouth daily 30 tablet 3    azelastine (ASTELIN) 0.1 % nasal spray 1 spray by Nasal route 2 times daily Use in each nostril as directed      hydroxychloroquine (PLAQUENIL) 200 MG tablet Take 200 mg by mouth daily      vitamin B-1 100 MG tablet Take 1 tablet by mouth daily 30 tablet 3    zoster recombinant adjuvanted vaccine (SHINGRIX) 50 MCG/0.5ML SUSR injection 50 MCG IM then repeat 2-6 months. 0.5 mL 1    lansoprazole (PREVACID) 30 MG delayed release capsule Take 30 mg by mouth daily      predniSONE (DELTASONE) 5 MG tablet Take 5 mg by mouth daily       timolol (TIMOPTIC) 0.5 % ophthalmic solution        No current facility-administered medications for this visit. ALLERGIES  No Known Allergies    PHYSICAL EXAM    BP (!) 142/72   Pulse 61   Temp 97.2 °F (36.2 °C)   Ht 6' (1.829 m)   Wt 192 lb 4.8 oz (87.2 kg)   SpO2 98%   BMI 26.08 kg/m²     Physical Exam  Vitals signs and nursing note reviewed. Constitutional:       Appearance: Normal appearance. Cardiovascular:      Rate and Rhythm: Normal rate. Pulmonary:      Effort: Pulmonary effort is normal.   Neurological:      General: No focal deficit present. Reviewed his most recent labs  Reconciled medications  Reviewed immunizations  He is completed both Covid vaccines    ASSESSMENT and Jes Riggins was seen today for 6 month follow-up and hypertension. Diagnoses and all orders for this visit:    Other acute pulmonary embolism with acute cor pulmonale (HCC)    Benign prostatic hyperplasia without lower urinary tract symptoms  -     finasteride (PROSCAR) 5 MG tablet; Take 1 tablet by mouth daily    Osteoarthritis, unspecified osteoarthritis type, unspecified site  -     allopurinol (ZYLOPRIM) 100 MG tablet; Take 1 tablet by mouth daily    Chronic obstructive pulmonary disease, unspecified COPD type ([facility] [address]  -     ADVAIR DISKUS 250-50 MCG/DOSE AEPB; Inhale 1 puff into the lungs 2 times daily    Impaired glucose tolerance    Essential hypertension    Other orders  -     amLODIPine (NORVASC) 5 MG tablet;  Take 1 tablet by mouth daily  -     Discontinue: lisinopril (PRINIVIL;ZESTRIL) 10 MG tablet; Take 1 tablet by mouth daily  -     lisinopril (PRINIVIL;ZESTRIL) 20 MG tablet; Take 1 tablet by mouth daily    I think it is okay to stop the aspirin as I see no indication for that he is going to be continued on Eliquis long-term  Increase lisinopril to 20 mg daily for better control of his hyper hypertension  Follow-up here in 6 months  We will probably get labs then    Return in about 6 months (around 8/19/2021). Electronically signed by Leeroy Tinajero MD on 2/19/2021    Please note that this chart was generated using dragon dictation software. Although every effort was made to ensure the accuracy of this automated transcription, some errors in transcription may have occurred.

## 2021-02-23 RX ORDER — CELECOXIB 400 MG/1
CAPSULE ORAL
Qty: 60 CAPSULE | Refills: 5 | Status: ON HOLD | OUTPATIENT
Start: 2021-02-23 | End: 2021-05-31 | Stop reason: HOSPADM

## 2021-02-23 NOTE — TELEPHONE ENCOUNTER
Requested Prescriptions     Signed Prescriptions Disp Refills    celecoxib (CELEBREX) 400 MG capsule 60 capsule 5     Sig: TAKE ONE CAPSULE BY MOUTH TWICE A DAY     Authorizing Provider: Praveen Richardson     Ordering User: No Frazier

## 2021-03-08 ENCOUNTER — TELEPHONE (OUTPATIENT)
Dept: CARDIOLOGY CLINIC | Age: 81
End: 2021-03-08

## 2021-04-19 LAB
ALBUMIN SERPL-MCNC: 4.2 G/DL
ALP BLD-CCNC: 44 U/L
ALT SERPL-CCNC: 13 U/L
ANION GAP SERPL CALCULATED.3IONS-SCNC: 0 MMOL/L
AST SERPL-CCNC: 20 U/L
BILIRUB SERPL-MCNC: 0.4 MG/DL (ref 0.1–1.4)
BUN BLDV-MCNC: 32 MG/DL
CALCIUM SERPL-MCNC: 9.6 MG/DL
CHLORIDE BLD-SCNC: 99 MMOL/L
CO2: 21 MMOL/L
CREAT SERPL-MCNC: 1.52 MG/DL
GFR CALCULATED: 42
GLUCOSE BLD-MCNC: 127 MG/DL
POTASSIUM SERPL-SCNC: 5.1 MMOL/L
SODIUM BLD-SCNC: 136 MMOL/L
TOTAL PROTEIN: 8.2

## 2021-04-26 ENCOUNTER — OFFICE VISIT (OUTPATIENT)
Dept: FAMILY MEDICINE CLINIC | Age: 81
End: 2021-04-26
Payer: MEDICARE

## 2021-04-26 VITALS
TEMPERATURE: 97.2 F | SYSTOLIC BLOOD PRESSURE: 110 MMHG | DIASTOLIC BLOOD PRESSURE: 56 MMHG | OXYGEN SATURATION: 97 % | BODY MASS INDEX: 25.73 KG/M2 | HEART RATE: 72 BPM | HEIGHT: 72 IN | WEIGHT: 190 LBS

## 2021-04-26 DIAGNOSIS — L03.115 CELLULITIS OF RIGHT LEG: ICD-10-CM

## 2021-04-26 DIAGNOSIS — I83.013 VENOUS ULCER OF ANKLE, RIGHT (HCC): Primary | ICD-10-CM

## 2021-04-26 DIAGNOSIS — L97.319 VENOUS ULCER OF ANKLE, RIGHT (HCC): Primary | ICD-10-CM

## 2021-04-26 PROCEDURE — G8427 DOCREV CUR MEDS BY ELIG CLIN: HCPCS | Performed by: FAMILY MEDICINE

## 2021-04-26 PROCEDURE — 99213 OFFICE O/P EST LOW 20 MIN: CPT | Performed by: FAMILY MEDICINE

## 2021-04-26 PROCEDURE — G8417 CALC BMI ABV UP PARAM F/U: HCPCS | Performed by: FAMILY MEDICINE

## 2021-04-26 PROCEDURE — 4040F PNEUMOC VAC/ADMIN/RCVD: CPT | Performed by: FAMILY MEDICINE

## 2021-04-26 PROCEDURE — 1123F ACP DISCUSS/DSCN MKR DOCD: CPT | Performed by: FAMILY MEDICINE

## 2021-04-26 PROCEDURE — 1036F TOBACCO NON-USER: CPT | Performed by: FAMILY MEDICINE

## 2021-04-26 RX ORDER — LISINOPRIL 20 MG/1
20 TABLET ORAL DAILY
Qty: 90 TABLET | Refills: 1 | Status: SHIPPED
Start: 2021-04-26 | End: 2021-10-27 | Stop reason: ALTCHOICE

## 2021-04-26 RX ORDER — SULFAMETHOXAZOLE AND TRIMETHOPRIM 400; 80 MG/1; MG/1
1 TABLET ORAL DAILY
Qty: 20 TABLET | Refills: 0 | Status: SHIPPED | OUTPATIENT
Start: 2021-04-26 | End: 2021-05-06

## 2021-04-26 NOTE — PROGRESS NOTES
2021     Oneyda Farris      Chief Complaint   Patient presents with    Other     Sore on RT outer ankle, noticed 6 months ago, pt reports sore has gotten worse past couple days, redness and pain       HPI      Rodney Mcleod is a 79 y.o. male who presents today with the followin. Venous ulcer of ankle, right ([address]    2. Cellulitis of right leg      He has an open area on his right ankle that has not healed over.   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   He is on long-term anticoagulation with Eliquis      REVIEW OF SYMPTOMS    Review of Systems    PAST MEDICAL HISTORY  Past Medical History:   Diagnosis Date    Acid reflux     Arthritis     \"Wrists\"    Benign prostatic hyperplasia     without outflow obstruction    BPH (benign prostatic hyperplasia)     [facility]     Dizziness     DVT [facility]deep venous thrombosis) ([facility])     Right Leg In Late 's, Right Leg In     Enlarged prostate     Essential hypertension     Glaucoma     Bilateral Eyes    Gout     H/O echocardiogram Limited 2020    EF 55-60%, Mild TR & PHTN.    [place] (hard of hearing)     Bilateral Hearing Aids    Hx of blood clots Dx Late s And     \"DVT Right Leg\"    Hypertension     Impaired glucose tolerance     Pneumonia 5/15/2018    Slow urinary stream     Syncope     with loss of consciousness    Teeth missing     Upper And Lower    Wears glasses        FAMILY HISTORY  Family History   Problem Relation Age of Onset    Dementia Mother     Diabetes type 2  Father        SOCIAL HISTORY  Social History     Socioeconomic History    Marital status:      Spouse name: None    Number of children: None    Years of education: None    Highest education level: None Occupational History    None   Social Needs    Financial resource strain: None    Food insecurity     Worry: None     Inability: None    Transportation needs     Medical: None     Non-medical: None   Tobacco Use    Smoking status: Former Smoker     Packs/day: 1.00     Years: 29.00     Pack years: 29.00     Types: Cigarettes, Pipe     Start date:      Quit date:      Years since quittin.1    Smokeless tobacco: Never Used   Substance and Sexual Activity    Alcohol use:  Yes     Alcohol/week: 2.0 - 3.0 standard drinks     Types: 2 - 3 Glasses of wine per week     Comment: \"2 Glasses Of Wine Daily\"    Drug use: No    Sexual activity: Not Currently   Lifestyle    Physical activity     Days per week: None     Minutes per session: None    Stress: None   Relationships    Social connections     Talks on phone: None     Gets together: None     Attends Rastafari service: None     Active member of club or organization: None     Attends meetings of clubs or organizations: None     Relationship status: None    Intimate partner violence     Fear of current or ex partner: None     Emotionally abused: None     Physically abused: None     Forced sexual activity: None   Other Topics Concern    None   Social History Narrative    None        SURGICAL HISTORY  Past Surgical History:   Procedure Laterality Date    ABDOMINAL EXPLORATION SURGERY      \"Intestines Were Tangled, Had Salmonella Poisoning\"    APPENDECTOMY      COLONOSCOPY  's    DENTAL SURGERY      Teeth Extracted In Past    EYE SURGERY Right     Cataract With Lens Implant    EYE SURGERY Left 2016    Cataract With Lens Implant    INGUINAL HERNIA REPAIR Right     With Mesh    INGUINAL HERNIA REPAIR Right 2018    With mesh and plug    VASECTOMY  1984       CURRENT MEDICATIONS  Current Outpatient Medications   Medication Sig Dispense Refill    lisinopril (PRINIVIL;ZESTRIL) 20 MG tablet Take 1 tablet by mouth daily 90 tablet 1    celecoxib (CELEBREX) 400 MG capsule TAKE ONE CAPSULE BY MOUTH TWICE A DAY 60 capsule 5    timolol (TIMOPTIC) 0.5 % ophthalmic solution       finasteride (PROSCAR) 5 MG tablet Take 1 tablet by mouth daily 90 tablet 1    amLODIPine (NORVASC) 5 MG tablet Take 1 tablet by mouth daily 90 tablet 1    allopurinol (ZYLOPRIM) 100 MG tablet Take 1 tablet by mouth daily 90 tablet 1    ADVAIR DISKUS 250-50 MCG/DOSE AEPB Inhale 1 puff into the lungs 2 times daily 3 Inhaler 1    apixaban (ELIQUIS) 5 MG TABS tablet Take 1 tablet by mouth 2 times daily 60 tablet 5    TIMOLOL MALEATE OP Apply to eye      latanoprost (XALATAN) 0.005 % ophthalmic solution 1 drop nightly      azelastine (ASTELIN) 0.1 % nasal spray 1 spray by Nasal route 2 times daily Use in each nostril as directed      hydroxychloroquine (PLAQUENIL) 200 MG tablet Take 200 mg by mouth daily      vitamin B-1 100 MG tablet Take 1 tablet by mouth daily 30 tablet 3    zoster recombinant adjuvanted vaccine (SHINGRIX) 50 MCG/0.5ML SUSR injection 50 MCG IM then repeat 2-6 months. 0.5 mL 1    lansoprazole (PREVACID) 30 MG delayed release capsule Take 30 mg by mouth daily      predniSONE (DELTASONE) 5 MG tablet Take 5 mg by mouth daily       OXYGEN Inhale 2 L into the lungs nightly       No current facility-administered medications for this visit. ALLERGIES  No Known Allergies    PHYSICAL EXAM    BP (!) 110/56 (Site: Left Upper Arm, Position: Sitting, Cuff Size: Medium Adult)   Pulse 72   Temp 97.2 °F (36.2 °C)   Ht 6' (1.829 m)   Wt 190 lb (86.2 kg)   SpO2 97%   BMI 25.77 kg/m²     Physical Exam  Skin:     Comments: Ulceration measuring 18 x 16 cm  There is a halo of redness around this and some peeling of the skin  There is no purulent drainage  Has varicosities of this leg             ASSESSMENT and Theo Paredes was seen today for other.     Diagnoses and all orders for this visit:    Venous ulcer of ankle, right (HCC)    Cellulitis of right leg    Other orders  -     lisinopril (PRINIVIL;ZESTRIL) 20 MG tablet; Take 1 tablet by mouth daily    Will treat with Bactrim for 10 days for possible surrounding cellulitis  Local care with triple antibiotic  Elevate the leg  Check this again in 3 months    Return in about 3 months (around 7/26/2021). Electronically signed by Osiris Ybarra MD on 4/26/2021    Please note that this chart was generated using dragon dictation software. Although every effort was made to ensure the accuracy of this automated transcription, some errors in transcription may have occurred.

## 2021-04-29 ENCOUNTER — TELEPHONE (OUTPATIENT)
Dept: FAMILY MEDICINE CLINIC | Age: 81
End: 2021-04-29

## 2021-05-23 NOTE — PROGRESS NOTES
Pt transported to  #2119 on cardiac monitor and EKOS machine. Bedside report given to [employer] and Hakan Edwards. Procedure site checked. Sheaths remain sutured in place with infusion catheters in place with infusions running. TPA infusing bilaterally at 1mgl/hr, NS  infusing bilaterally at 35ml/hr. Heparin infusing in side port of sheaths at 250units/hr. Call light in reach. William Galvan

## 2021-05-26 ENCOUNTER — OFFICE VISIT (OUTPATIENT)
Dept: FAMILY MEDICINE CLINIC | Age: 81
End: 2021-05-26
Payer: MEDICARE

## 2021-05-26 VITALS
DIASTOLIC BLOOD PRESSURE: 60 MMHG | BODY MASS INDEX: 25.83 KG/M2 | SYSTOLIC BLOOD PRESSURE: 115 MMHG | HEART RATE: 65 BPM | HEIGHT: 72 IN | OXYGEN SATURATION: 93 % | WEIGHT: 190.7 LBS

## 2021-05-26 DIAGNOSIS — S81.801D WOUND OF RIGHT LOWER EXTREMITY, SUBSEQUENT ENCOUNTER: Primary | ICD-10-CM

## 2021-05-26 DIAGNOSIS — I95.9 HYPOTENSION, UNSPECIFIED HYPOTENSION TYPE: ICD-10-CM

## 2021-05-26 DIAGNOSIS — E08.65 DIABETES MELLITUS DUE TO UNDERLYING CONDITION WITH HYPERGLYCEMIA, UNSPECIFIED WHETHER LONG TERM INSULIN USE (HCC): ICD-10-CM

## 2021-05-26 PROCEDURE — 99213 OFFICE O/P EST LOW 20 MIN: CPT | Performed by: STUDENT IN AN ORGANIZED HEALTH CARE EDUCATION/TRAINING PROGRAM

## 2021-05-26 NOTE — PROGRESS NOTES
5/26/2021    Shruthi Hawkins    Chief Complaint   Patient presents with    Leg Injury     pt states that he has a large sore on his rt leg that has been there for months and has just got worse . pt states no seeping but it is painful . Pt states that he has a history of blood clots . Pt states that he did not injure it that he knows of     Other     DR Niraj Machuca ordered some labs for pt to do and he went to lab go today and he thinks it is his sodium . He has not got results back . Pt states that he has been feeling poorly        HPI  History was obtained from patient. Ela Moore is a 79 y.o. male with a PMHx as listed below who presents today for left leg injury/wound. This is a chronic injury that has not been healing this has been ongoing for months. Patient is on Eliquis hx. Of dvt    Notes no issues with sleep, declines sleep study  Patient did not tolerate sleep study in the past      1. Wound of right lower extremity, subsequent encounter    2. Diabetes mellitus due to underlying condition with hyperglycemia, unspecified whether long term insulin use ([facility] [address]     3. Hypotension, unspecified hypotension type             REVIEW OF SYMPTOMS    Review of Systems   Constitutional: Negative for chills and fatigue. HENT: Negative for congestion and sore throat. Respiratory: Negative for shortness of breath and wheezing. Cardiovascular: Negative for chest pain and palpitations. Gastrointestinal: Negative for abdominal pain and nausea. Genitourinary: Negative for frequency and urgency. Neurological: Negative for light-headedness.        PAST MEDICAL HISTORY  Past Medical History:   Diagnosis Date    Acid reflux     Arthritis     \"Wrists\"    Benign prostatic hyperplasia     without outflow obstruction    BPH (benign prostatic hyperplasia)     [facility] disease[facility])     Dizziness     DVT (deep venous thrombosis) ([facility])     Right Leg In Late 1980's, Right Leg In 2002    Enlarged prostate     Essential hypertension     Glaucoma     Bilateral Eyes    Gout     H/O echocardiogram Limited 2020    EF 55-60%, Mild TR & PHTN.    [place] (hard of hearing)     Bilateral Hearing Aids    Hx of blood clots Dx Late  And     \"DVT Right Leg\"    Hypertension     Impaired glucose tolerance     Pneumonia 5/15/2018    Slow urinary stream     Syncope     with loss of consciousness    Teeth missing     Upper And Lower    Wears glasses        FAMILY HISTORY  Family History   Problem Relation Age of Onset    Dementia Mother     Diabetes type 2  Father        SOCIAL HISTORY  Social History     Socioeconomic History    Marital status:      Spouse name: None    Number of children: None    Years of education: None    Highest education level: None   Occupational History    None   Tobacco Use    Smoking status: Former Smoker     Packs/day: 1.00     Years: 29.00     Pack years: 29.00     Types: Cigarettes, Pipe     Start date:      Quit date:      Years since quittin.4    Smokeless tobacco: Never Used   Vaping Use    Vaping Use: Never used   Substance and Sexual Activity    Alcohol use: Yes     Alcohol/week: 2.0 - 3.0 standard drinks     Types: 2 - 3 Glasses of wine per week     Comment: \"2 Glasses Of Wine Daily\"    Drug use: No    Sexual activity: Not Currently   Other Topics Concern    None   Social History Narrative    None     Social Determinants of Health     Financial Resource Strain:     Difficulty of Paying Living Expenses:    Food Insecurity:     Worried About Running Out of Food in the Last Year:     Ran Out of Food in the Last Year:    Transportation Needs:     Lack of Transportation (Medical):      Lack of Transportation (Non-Medical):    Physical Activity:     Days of Exercise per Week:     Minutes of Exercise per Session:    Stress:     Feeling of Stress :    Social Connections:     Frequency of Communication with Friends and Take 1 tablet by mouth daily 30 tablet 3    zoster recombinant adjuvanted vaccine (SHINGRIX) 50 MCG/0.5ML SUSR injection 50 MCG IM then repeat 2-6 months. 0.5 mL 1    lansoprazole (PREVACID) 30 MG delayed release capsule Take 30 mg by mouth daily      predniSONE (DELTASONE) 5 MG tablet Take 5 mg by mouth daily       OXYGEN Inhale 2 L into the lungs nightly (Patient not taking: Reported on 5/26/2021)       No current facility-administered medications for this visit. ALLERGIES  No Known Allergies    PHYSICAL EXAM    /60   Pulse 65   Ht 6' (1.829 m)   Wt 190 lb 11.2 oz (86.5 kg)   SpO2 93%   BMI 25.86 kg/m²     Physical Exam  Constitutional:       Appearance: Normal appearance. HENT:      Head: Normocephalic and atraumatic. Eyes:      Extraocular Movements: Extraocular movements intact. Pupils: Pupils are equal, round, and reactive to light. Cardiovascular:      Rate and Rhythm: Normal rate and regular rhythm. Pulses: Normal pulses. Heart sounds: No murmur heard. No friction rub. No gallop. Skin:     General: Skin is warm and dry. Neurological:      General: No focal deficit present. Mental Status: He is alert. Psychiatric:         Mood and Affect: Mood normal.         Behavior: Behavior normal.         ASSESSMENT & PLAN    1. Wound of right lower extremity, subsequent encounter  -chronic RLE wound not healing  -f/u on arterial doppler  -given history of DVT will refer to Vascular for further evaluation  - VL LOWER EXTREMITY ARTERIES BILATERAL; Future  - Aditya Mann MD, Cardiology, [place]    2. Diabetes mellitus due to underlying condition with hyperglycemia, unspecified whether long term insulin use (HCC)   - VL LOWER EXTREMITY ARTERIES BILATERAL; Future    3. Hypotension, unspecified hypotension type        Return for needs awv, as scheduled.          Low bp consider midodrine    Electronically signed by La Castañeda DO on 5/26/2021

## 2021-05-28 ENCOUNTER — APPOINTMENT (OUTPATIENT)
Dept: GENERAL RADIOLOGY | Age: 81
DRG: 641 | End: 2021-05-28
Payer: MEDICARE

## 2021-05-28 ENCOUNTER — HOSPITAL ENCOUNTER (INPATIENT)
Age: 81
LOS: 3 days | Discharge: HOME | DRG: 641 | End: 2021-05-31
Attending: EMERGENCY MEDICINE | Admitting: INTERNAL MEDICINE
Payer: MEDICARE

## 2021-05-28 DIAGNOSIS — R53.1 GENERAL WEAKNESS: ICD-10-CM

## 2021-05-28 DIAGNOSIS — E87.1 HYPONATREMIA: Primary | ICD-10-CM

## 2021-05-28 PROBLEM — I95.9 HYPOTENSION: Status: ACTIVE | Noted: 2021-05-28

## 2021-05-28 PROBLEM — N40.0 BPH (BENIGN PROSTATIC HYPERPLASIA): Status: ACTIVE | Noted: 2021-05-28

## 2021-05-28 LAB
ALBUMIN SERPL-MCNC: 3.2 GM/DL (ref 3.4–5)
ALP BLD-CCNC: 41 IU/L (ref 40–129)
ALT SERPL-CCNC: 13 U/L (ref 10–40)
ANION GAP SERPL CALCULATED.3IONS-SCNC: 11 MMOL/L (ref 4–16)
ANION GAP SERPL CALCULATED.3IONS-SCNC: 15 MMOL/L (ref 4–16)
AST SERPL-CCNC: 16 IU/L (ref 15–37)
BACTERIA: NEGATIVE /HPF
BASOPHILS ABSOLUTE: 0 K/CU MM
BASOPHILS RELATIVE PERCENT: 0.7 % (ref 0–1)
BILIRUB SERPL-MCNC: 0.3 MG/DL (ref 0–1)
BILIRUBIN URINE: NEGATIVE MG/DL
BLOOD, URINE: NEGATIVE
BUN BLDV-MCNC: 11 MG/DL (ref 6–23)
BUN BLDV-MCNC: 13 MG/DL (ref 6–23)
CALCIUM SERPL-MCNC: 8.6 MG/DL (ref 8.3–10.6)
CALCIUM SERPL-MCNC: 8.6 MG/DL (ref 8.3–10.6)
CHLORIDE BLD-SCNC: 90 MMOL/L (ref 99–110)
CHLORIDE BLD-SCNC: 93 MMOL/L (ref 99–110)
CLARITY: CLEAR
CO2: 18 MMOL/L (ref 21–32)
CO2: 21 MMOL/L (ref 21–32)
COLOR: YELLOW
CREAT SERPL-MCNC: 0.9 MG/DL (ref 0.9–1.3)
CREAT SERPL-MCNC: 1.3 MG/DL (ref 0.9–1.3)
DIFFERENTIAL TYPE: ABNORMAL
EOSINOPHILS ABSOLUTE: 0.1 K/CU MM
EOSINOPHILS RELATIVE PERCENT: 2.4 % (ref 0–3)
GFR AFRICAN AMERICAN: >60 ML/MIN/1.73M2
GFR AFRICAN AMERICAN: >60 ML/MIN/1.73M2
GFR NON-AFRICAN AMERICAN: 53 ML/MIN/1.73M2
GFR NON-AFRICAN AMERICAN: >60 ML/MIN/1.73M2
GLUCOSE BLD-MCNC: 122 MG/DL (ref 70–99)
GLUCOSE BLD-MCNC: 123 MG/DL (ref 70–99)
GLUCOSE, URINE: NEGATIVE MG/DL
HCT VFR BLD CALC: 33.3 % (ref 42–52)
HEMOGLOBIN: 11.1 GM/DL (ref 13.5–18)
HYALINE CASTS: 2 /LPF
IMMATURE NEUTROPHIL %: 0.5 % (ref 0–0.43)
KETONES, URINE: ABNORMAL MG/DL
LACTATE: 1.6 MMOL/L (ref 0.4–2)
LEUKOCYTE ESTERASE, URINE: NEGATIVE
LYMPHOCYTES ABSOLUTE: 1 K/CU MM
LYMPHOCYTES RELATIVE PERCENT: 16.1 % (ref 24–44)
MCH RBC QN AUTO: 31.3 PG (ref 27–31)
MCHC RBC AUTO-ENTMCNC: 33.3 % (ref 32–36)
MCV RBC AUTO: 93.8 FL (ref 78–100)
MONOCYTES ABSOLUTE: 0.7 K/CU MM
MONOCYTES RELATIVE PERCENT: 11.4 % (ref 0–4)
MUCUS: ABNORMAL HPF
NITRITE URINE, QUANTITATIVE: NEGATIVE
NUCLEATED RBC %: 0 %
PDW BLD-RTO: 13.4 % (ref 11.7–14.9)
PH, URINE: 5 (ref 5–8)
PLATELET # BLD: 237 K/CU MM (ref 140–440)
PMV BLD AUTO: 9.3 FL (ref 7.5–11.1)
POTASSIUM SERPL-SCNC: 4 MMOL/L (ref 3.5–5.1)
POTASSIUM SERPL-SCNC: 4.6 MMOL/L (ref 3.5–5.1)
PRO-BNP: 345.7 PG/ML
PROTEIN UA: 30 MG/DL
RBC # BLD: 3.55 M/CU MM (ref 4.6–6.2)
RBC URINE: 1 /HPF (ref 0–3)
SEGMENTED NEUTROPHILS ABSOLUTE COUNT: 4.1 K/CU MM
SEGMENTED NEUTROPHILS RELATIVE PERCENT: 68.9 % (ref 36–66)
SODIUM BLD-SCNC: 122 MMOL/L (ref 135–145)
SODIUM BLD-SCNC: 126 MMOL/L (ref 135–145)
SODIUM URINE: 82 MMOL/L (ref 35–167)
SPECIFIC GRAVITY UA: 1.02 (ref 1–1.03)
SQUAMOUS EPITHELIAL: 1 /HPF
TOTAL IMMATURE NEUTOROPHIL: 0.03 K/CU MM
TOTAL NUCLEATED RBC: 0 K/CU MM
TOTAL PROTEIN: 7 GM/DL (ref 6.4–8.2)
TRICHOMONAS: ABNORMAL /HPF
TROPONIN T: 0.02 NG/ML
UROBILINOGEN, URINE: NEGATIVE MG/DL (ref 0.2–1)
WBC # BLD: 6 K/CU MM (ref 4–10.5)
WBC UA: 2 /HPF (ref 0–2)

## 2021-05-28 PROCEDURE — 2140000000 HC CCU INTERMEDIATE R&B

## 2021-05-28 PROCEDURE — 80048 BASIC METABOLIC PNL TOTAL CA: CPT

## 2021-05-28 PROCEDURE — 93005 ELECTROCARDIOGRAM TRACING: CPT | Performed by: EMERGENCY MEDICINE

## 2021-05-28 PROCEDURE — 83935 ASSAY OF URINE OSMOLALITY: CPT

## 2021-05-28 PROCEDURE — 36415 COLL VENOUS BLD VENIPUNCTURE: CPT

## 2021-05-28 PROCEDURE — 2580000003 HC RX 258: Performed by: EMERGENCY MEDICINE

## 2021-05-28 PROCEDURE — 6360000002 HC RX W HCPCS: Performed by: INTERNAL MEDICINE

## 2021-05-28 PROCEDURE — 71045 X-RAY EXAM CHEST 1 VIEW: CPT

## 2021-05-28 PROCEDURE — 83605 ASSAY OF LACTIC ACID: CPT

## 2021-05-28 PROCEDURE — 84484 ASSAY OF TROPONIN QUANT: CPT

## 2021-05-28 PROCEDURE — 84300 ASSAY OF URINE SODIUM: CPT

## 2021-05-28 PROCEDURE — 81001 URINALYSIS AUTO W/SCOPE: CPT

## 2021-05-28 PROCEDURE — 99284 EMERGENCY DEPT VISIT MOD MDM: CPT

## 2021-05-28 PROCEDURE — 83880 ASSAY OF NATRIURETIC PEPTIDE: CPT

## 2021-05-28 PROCEDURE — 6370000000 HC RX 637 (ALT 250 FOR IP): Performed by: NURSE PRACTITIONER

## 2021-05-28 PROCEDURE — 83930 ASSAY OF BLOOD OSMOLALITY: CPT

## 2021-05-28 PROCEDURE — 85025 COMPLETE CBC W/AUTO DIFF WBC: CPT

## 2021-05-28 PROCEDURE — 2580000003 HC RX 258: Performed by: NURSE PRACTITIONER

## 2021-05-28 PROCEDURE — 80053 COMPREHEN METABOLIC PANEL: CPT

## 2021-05-28 RX ORDER — PROMETHAZINE HYDROCHLORIDE 25 MG/1
12.5 TABLET ORAL EVERY 6 HOURS PRN
Status: DISCONTINUED | OUTPATIENT
Start: 2021-05-28 | End: 2021-05-31 | Stop reason: HOSPADM

## 2021-05-28 RX ORDER — SODIUM CHLORIDE 0.9 % (FLUSH) 0.9 %
5-40 SYRINGE (ML) INJECTION EVERY 12 HOURS SCHEDULED
Status: DISCONTINUED | OUTPATIENT
Start: 2021-05-28 | End: 2021-05-31 | Stop reason: HOSPADM

## 2021-05-28 RX ORDER — SODIUM CHLORIDE 9 MG/ML
25 INJECTION, SOLUTION INTRAVENOUS PRN
Status: DISCONTINUED | OUTPATIENT
Start: 2021-05-28 | End: 2021-05-31 | Stop reason: HOSPADM

## 2021-05-28 RX ORDER — AZELASTINE 1 MG/ML
1 SPRAY, METERED NASAL 2 TIMES DAILY
Status: DISCONTINUED | OUTPATIENT
Start: 2021-05-28 | End: 2021-05-31 | Stop reason: HOSPADM

## 2021-05-28 RX ORDER — ONDANSETRON 2 MG/ML
4 INJECTION INTRAMUSCULAR; INTRAVENOUS EVERY 6 HOURS PRN
Status: DISCONTINUED | OUTPATIENT
Start: 2021-05-28 | End: 2021-05-31 | Stop reason: HOSPADM

## 2021-05-28 RX ORDER — TIMOLOL MALEATE 5 MG/ML
1 SOLUTION/ DROPS OPHTHALMIC DAILY
Status: DISCONTINUED | OUTPATIENT
Start: 2021-05-29 | End: 2021-05-31 | Stop reason: HOSPADM

## 2021-05-28 RX ORDER — ACETAMINOPHEN 325 MG/1
650 TABLET ORAL EVERY 6 HOURS PRN
Status: DISCONTINUED | OUTPATIENT
Start: 2021-05-28 | End: 2021-05-31 | Stop reason: HOSPADM

## 2021-05-28 RX ORDER — ACETAMINOPHEN 650 MG/1
650 SUPPOSITORY RECTAL EVERY 6 HOURS PRN
Status: DISCONTINUED | OUTPATIENT
Start: 2021-05-28 | End: 2021-05-31 | Stop reason: HOSPADM

## 2021-05-28 RX ORDER — POLYETHYLENE GLYCOL 3350 17 G/17G
17 POWDER, FOR SOLUTION ORAL DAILY PRN
Status: DISCONTINUED | OUTPATIENT
Start: 2021-05-28 | End: 2021-05-31 | Stop reason: HOSPADM

## 2021-05-28 RX ORDER — BUDESONIDE AND FORMOTEROL FUMARATE DIHYDRATE 160; 4.5 UG/1; UG/1
2 AEROSOL RESPIRATORY (INHALATION) 2 TIMES DAILY
Status: DISCONTINUED | OUTPATIENT
Start: 2021-05-28 | End: 2021-05-31 | Stop reason: HOSPADM

## 2021-05-28 RX ORDER — ALLOPURINOL 100 MG/1
100 TABLET ORAL DAILY
Status: DISCONTINUED | OUTPATIENT
Start: 2021-05-29 | End: 2021-05-31 | Stop reason: HOSPADM

## 2021-05-28 RX ORDER — PANTOPRAZOLE SODIUM 40 MG/1
40 TABLET, DELAYED RELEASE ORAL
Status: DISCONTINUED | OUTPATIENT
Start: 2021-05-29 | End: 2021-05-31 | Stop reason: HOSPADM

## 2021-05-28 RX ORDER — LANOLIN ALCOHOL/MO/W.PET/CERES
100 CREAM (GRAM) TOPICAL DAILY
Status: DISCONTINUED | OUTPATIENT
Start: 2021-05-29 | End: 2021-05-31 | Stop reason: HOSPADM

## 2021-05-28 RX ORDER — LATANOPROST 50 UG/ML
1 SOLUTION/ DROPS OPHTHALMIC NIGHTLY
Status: DISCONTINUED | OUTPATIENT
Start: 2021-05-28 | End: 2021-05-31 | Stop reason: HOSPADM

## 2021-05-28 RX ORDER — SODIUM CHLORIDE 0.9 % (FLUSH) 0.9 %
5-40 SYRINGE (ML) INJECTION PRN
Status: DISCONTINUED | OUTPATIENT
Start: 2021-05-28 | End: 2021-05-31 | Stop reason: HOSPADM

## 2021-05-28 RX ORDER — SODIUM CHLORIDE 9 MG/ML
INJECTION, SOLUTION INTRAVENOUS CONTINUOUS
Status: DISCONTINUED | OUTPATIENT
Start: 2021-05-28 | End: 2021-05-29

## 2021-05-28 RX ORDER — FINASTERIDE 5 MG/1
5 TABLET, FILM COATED ORAL DAILY
Status: DISCONTINUED | OUTPATIENT
Start: 2021-05-29 | End: 2021-05-31 | Stop reason: HOSPADM

## 2021-05-28 RX ORDER — HYDROXYCHLOROQUINE SULFATE 200 MG/1
200 TABLET, FILM COATED ORAL DAILY
Status: DISCONTINUED | OUTPATIENT
Start: 2021-05-29 | End: 2021-05-31 | Stop reason: HOSPADM

## 2021-05-28 RX ADMIN — SODIUM CHLORIDE: 9 INJECTION, SOLUTION INTRAVENOUS at 19:14

## 2021-05-28 RX ADMIN — APIXABAN 5 MG: 5 TABLET, FILM COATED ORAL at 21:51

## 2021-05-28 RX ADMIN — SODIUM CHLORIDE, PRESERVATIVE FREE 10 ML: 5 INJECTION INTRAVENOUS at 21:50

## 2021-05-28 RX ADMIN — HYDROCORTISONE SODIUM SUCCINATE 100 MG: 100 INJECTION, POWDER, FOR SOLUTION INTRAMUSCULAR; INTRAVENOUS at 19:19

## 2021-05-28 RX ADMIN — AZELASTINE HYDROCHLORIDE 1 SPRAY: 137 SPRAY, METERED NASAL at 21:51

## 2021-05-28 RX ADMIN — LATANOPROST 1 DROP: 50 SOLUTION/ DROPS OPHTHALMIC at 21:51

## 2021-05-28 ASSESSMENT — ENCOUNTER SYMPTOMS
BACK PAIN: 0
COUGH: 0
RHINORRHEA: 0
EYE PAIN: 0
SHORTNESS OF BREATH: 0
SORE THROAT: 0
EYE DISCHARGE: 0
NAUSEA: 0
ABDOMINAL PAIN: 0
VOMITING: 0

## 2021-05-28 ASSESSMENT — PAIN SCALES - GENERAL
PAINLEVEL_OUTOF10: 0
PAINLEVEL_OUTOF10: 0

## 2021-05-28 ASSESSMENT — PAIN SCALES - WONG BAKER: WONGBAKER_NUMERICALRESPONSE: 0

## 2021-05-28 NOTE — ED PROVIDER NOTES
[address] ENCOUNTER      Pt Name: Michelle Hancock  MRN: [id number]  [place] 1940  Date of evaluation: 5/28/2021  Provider: Tahir Small MD    CHIEF COMPLAINT       Chief Complaint   Patient presents with    Dizziness     reports feeling \"lightheaded\"     Hypotension         HISTORY OF PRESENT ILLNESS      Michelle Hancock is a 79 y.o. male who presents to the emergency department  for   Chief Complaint   Patient presents with    Dizziness     reports feeling \"lightheaded\"     Hypotension       50-year-old male presents with generalized weakness as well as for report of hyponatremia. He had some routine labs done 2 days ago was told that his sodium is down to 26. He has been hyponatremic previously not been admitted for such. He is under the care of a nephrologist for sodium. He presents with his wife who provides collateral information. She reports that his sodium levels have improved since his hospitalization. She does endorse that they've been traveling recently and has had a bit of poor oral intake. He also has intermittent issues with both constipation and diarrhea for which he takes various medications. He has not had any fevers or chills. Denies any acute pain complaints in the emergency department. No chest pain or abdominal pain. Denies any sick contacts. He is alert and oriented answer questions appropriately. He is on no focal weakness or paresthesias. No change in speech, hearing or vision. Patient's wife does note that he is sleeping a lot and is generally very weak. He is alert orient to the emergency department. GCS of 15. Is moving all extremities spontaneously. He does endorse lightheadedness. Denies any vertiginous dizziness. Reports that he is been having the symptoms for several weeks. Nursing Notes, Triage Notes & Vital Signs were reviewed.       REVIEW OF SYSTEMS (2-9 systems for level 4, 10 or more for level 5)     Review of Systems   Constitutional: Negative for chills and fever. HENT: Negative for congestion, rhinorrhea and sore throat. Eyes: Negative for pain and discharge. Respiratory: Negative for cough and shortness of breath. Cardiovascular: Negative for chest pain and palpitations. Gastrointestinal: Negative for abdominal pain, nausea and vomiting. Endocrine: Negative for polydipsia and polyuria. Genitourinary: Negative for dysuria and flank pain. Musculoskeletal: Negative for back pain and neck pain. Skin: Positive for wound. Negative for pallor. Neurological: Positive for weakness and light-headedness. Negative for dizziness, facial asymmetry, numbness and headaches. Psychiatric/Behavioral: Negative for confusion. Except as noted above the remainder of the review of systems was reviewed and negative. PAST MEDICAL HISTORY     Past Medical History:   Diagnosis Date    Acid reflux     Arthritis     \"Wrists\"    Benign prostatic hyperplasia     without outflow obstruction    BPH (benign prostatic hyperplasia)     COPD [facility]chronic obstructive pulmonary disease) ([facility])     Dizziness     DVT (deep venous thrombosis) (formerly [facility])     Right Leg In Late 1980's, Right Leg In 2002    Enlarged prostate     Essential hypertension     Glaucoma     Bilateral Eyes    Gout     H/O echocardiogram Limited 11/30/2020    EF 55-60%, Mild TR & PHTN.    [place] (hard of hearing)     Bilateral Hearing Aids    Hx of blood clots Dx Late 1980's And 2002    \"DVT Right Leg\"    Hypertension     Impaired glucose tolerance     Pneumonia 5/15/2018    Slow urinary stream     Syncope     with loss of consciousness    Teeth missing     Upper And Lower    Wears glasses        Prior to Admission medications    Medication Sig Start Date End Date Taking?  Authorizing Provider   lisinopril (PRINIVIL;ZESTRIL) 20 MG tablet Take 1 tablet by mouth daily 4/26/21 Marques Childress MD   celecoxib (CELEBREX) 400 MG capsule TAKE ONE CAPSULE BY MOUTH TWICE A DAY 2/23/21 5/26/21  Marques Childress MD   timolol (TIMOPTIC) 0.5 % ophthalmic solution  2/2/21   Historical Provider, MD   finasteride (PROSCAR) 5 MG tablet Take 1 tablet by mouth daily 2/19/21   Marques Childress MD   amLODIPine (NORVASC) 5 MG tablet Take 1 tablet by mouth daily 2/19/21   Marques Childress MD   allopurinol (ZYLOPRIM) 100 MG tablet Take 1 tablet by mouth daily 2/19/21   Marques Childress MD   ADVAIR DISKUS 250-50 MCG/DOSE AEPB Inhale 1 puff into the lungs 2 times daily 2/19/21 5/26/21  Marques Childress MD   apixaban (ELIQUIS) 5 MG TABS tablet Take 1 tablet by mouth 2 times daily 11/18/20   Pia Pina MD   OXYGEN Inhale 2 L into the lungs nightly  Patient not taking: Reported on 5/26/2021    Historical Provider, MD   TIMOLOL MALEATE OP Apply to eye    Historical Provider, MD   latanoprost (XALATAN) 0.005 % ophthalmic solution 1 drop nightly    Historical Provider, MD   azelastine (ASTELIN) 0.1 % nasal spray 1 spray by Nasal route 2 times daily Use in each nostril as directed    Historical Provider, MD   hydroxychloroquine (PLAQUENIL) 200 MG tablet Take 200 mg by mouth daily    Historical Provider, MD   vitamin B-1 100 MG tablet Take 1 tablet by mouth daily 9/12/20   Mikey Pedro MD   zoster recombinant adjuvanted vaccine [facility]) 50 MCG/0.5ML SUSR injection 50 MCG IM then repeat 2-6 months.  8/10/20 8/10/21  Marques Childress MD   lansoprazole (PREVACID) 30 MG delayed release capsule Take 30 mg by mouth daily    Historical Provider, MD   predniSONE (DELTASONE) 5 MG tablet Take 5 mg by mouth daily  2/17/17   Historical Provider, MD        Patient Active Problem List   Diagnosis    Primary osteoarthritis of right wrist    Essential hypertension    Impaired glucose tolerance    COPD (chronic obstructive pulmonary disease) (HCC)    Benign prostatic hyperplasia    Post herpetic neuralgia PHYSICAL EXAM    (up to 7 for level 4, 8 or more for level 5)     ED Triage Vitals [05/28/21 1428]   BP Temp Temp src Pulse Resp SpO2 Height Weight   (!) 64/40 -- -- 79 14 94 % 6' (1.829 m) 180 lb (81.6 kg)       Physical Exam  Constitutional:       Appearance: He is not ill-appearing or toxic-appearing. HENT:      Head: Normocephalic and atraumatic. Nose: No congestion or rhinorrhea. Mouth/Throat:      Mouth: Mucous membranes are moist.      Pharynx: No oropharyngeal exudate or posterior oropharyngeal erythema. Eyes:      General:         Right eye: No discharge. Left eye: No discharge. Extraocular Movements: Extraocular movements intact. Pupils: Pupils are equal, round, and reactive to light. Cardiovascular:      Rate and Rhythm: Normal rate. Heart sounds: No friction rub. No gallop. Pulmonary:      Effort: Pulmonary effort is normal. No respiratory distress. Chest:      Chest wall: No tenderness. Abdominal:      Palpations: Abdomen is soft. Tenderness: There is no abdominal tenderness. There is no guarding. Musculoskeletal:         General: Signs of injury present. Cervical back: Normal range of motion and neck supple. No tenderness. Right lower leg: No edema. Left lower leg: No edema. Comments: B/l lower extremities neurovascularly intact  Moving all extremities spontaneously   Lymphadenopathy:      Cervical: No cervical adenopathy. Skin:     Capillary Refill: Capillary refill takes less than 2 seconds. Comments: Shallow ulcer on lateral LLE-present on presentation     Neurological:      General: No focal deficit present. Mental Status: He is alert and oriented to person, place, and time. Mental status is at baseline. Cranial Nerves: No cranial nerve deficit.          DIAGNOSTIC RESULTS     Labs Reviewed   COMPREHENSIVE METABOLIC PANEL W/ REFLEX TO MG FOR LOW K - Abnormal; Notable for the following components: components within normal limits   TROPONIN - Abnormal; Notable for the following components:    Troponin T 0.017 (*)     All other components within normal limits   BRAIN NATRIURETIC PEPTIDE - Abnormal; Notable for the following components:    Pro-.7 (*)     All other components within normal limits   LACTIC ACID, PLASMA   URINE RT REFLEX TO CULTURE   SODIUM, URINE, RANDOM   OSMOLALITY, URINE   OSMOLALITY       All other labs were within normal range or not returned as of this dictation. EMERGENCY DEPARTMENT COURSE and DIFFERENTIAL DIAGNOSIS/MDM:   Vitals:    Vitals:    05/28/21 1428 05/28/21 1443   BP: (!) 64/40 126/62   Pulse: 79 71   Resp: 14 22   Temp: 98 °F (36.7 °C)    TempSrc: Oral    SpO2: 94% 93%   Weight: 180 lb (81.6 kg)    Height: 6' (1.829 m)            MDM  Number of Diagnoses or Management Options  Hyponatremia  Diagnosis management comments: 79-year-old male presents with low blood pressure and reports of hyponatremia. He has issues with low serum sodium previously. He has been hospitalized with it before. Does follow with a nephrologist (Dr. Riky Whatley). He presents with his wife who provides collateral information. They have been traveling recently. He has been having some diarrhea recently. He is not currently being supplemented with sodium tablets. He does state that he had some generalized weakness. Denies any focal complaints. Does endorse lightheadedness. He was initially had a low blood pressure taken in triage. When that was rechecked it was within normal limits. I do feel that the initial value was probably done in error. Does present with mildly low ox saturations of 93% on room air. No signs of respiratory distress. Does have a history of chronic lung disease. He is here to try the emergency department. He is alert and oriented moving all extremity spontaneously. No acute change. Is a chronically elevated troponin is a bit improved compared to prior values.   Serum creatinine is within normal limits. Sodium is low 122. Chloride is also low at 90. No leukocytosis. I did speak with his nephrologist.  I also obtain urine sodium, serum osmole's and urine osmole's. He will be started on a maintenance rate of 75 mL/h of sodium and will get scheduled rechecks of his sodium. He will be admitted for further evaluation management. Amount and/or Complexity of Data Reviewed  Decide to obtain previous medical records or to obtain history from someone other than the patient: yes        -  Patient seen and evaluated in the emergency department. -  Triage and nursing notes reviewed and incorporated. -  Old chart records reviewed and incorporated. -  Work-up included:  See above  -  Results discussed with patient. REASSESSMENT          CRITICAL CARE TIME       Total critical care time today provided was 35 minutes. This excludes seperately billable procedure. Critical care time provided for hyponatremia, low blood prssure that required close evaluation and/or intervention with concern for patient decompensation. This excludes seperately billable procedures and family discussion time. Critical care time provided for obtaining history, conducting a physical exam, performing and monitoring interventions, ordering, collecting and interpreting tests, and establishing medical decision-making. There was a potential for life/limb threatening pathology requiring close evaluation and intervention with concern for patient decompensation. CONSULTS:  IP CONSULT TO NEPHROLOGY  IP CONSULT TO HOSPITALIST    PROCEDURES:  None performed unless otherwise noted below     Procedures        FINAL IMPRESSION      1. Hyponatremia    2. General weakness          DISPOSITION/PLAN   DISPOSITION Decision To Admit 05/28/2021 05:06:45 PM      PATIENT REFERRED TO:  No follow-up provider specified.     DISCHARGE MEDICATIONS:  New Prescriptions    No medications on file       ED Provider Disposition Time  DISPOSITION Decision To Admit 05/28/2021 05:06:45 PM      Appropriate personal protective equipment was worn during the patient's evaluation. These included surgical, eye protection, surgical mask or in 95 respirator and gloves. The patient was also placed in a surgical mask for the prevention of possible spread of respiratory viral illnesses. The Patient was instructed to read the package inserts with any medication that was prescribed. Major potential reactions and medication interactions were discussed. The Patient understands that there are numerous possible adverse reactions not covered. The patient was also instructed to arrange follow-up with his or her primary care provider for review of any pending labwork or incidental findings on any radiology results that were obtained. All efforts were made to discuss any incidental findings that require further monitoring. Controlled Substances Monitoring:     No flowsheet data found.     (Please note that portions of this note were completed with a voice recognition program.  Efforts were made to edit the dictations but occasionally words are mis-transcribed.)    Claude Escudero MD (electronically signed)  Attending Emergency Physician           Claude Escudero MD  05/28/21 1600

## 2021-05-28 NOTE — H&P
History and Physical      Name:  Matias Jacques /Age/Sex: 1940  (79 y.o. male)   MRN & CSN:  [id number] & [id number] Admission Date/Time: 2021  2:34 PM   Location:  ED[facility]/ED-25 PCP: Pretty Blevins MD       Matias Jacques is a 79 y.o.  male  who presents with Dizziness (reports feeling \"lightheaded\" ) and Hypotension      Assessment and Plan:     1. Hyponatremia      Lab reported sodium 122    Generalized body weakness, dizziness and lightheadedness    Fall precaution,    NS at 75ml/hr , BMP every 4hrs    Nephrology consult, GI and DVT prophylaxis     2. Hypotension     Initially reported as 64/40    Hold current BP medications. ( Lisinopril and amlodipine)    3. Gout     Continue allopurinol    4. Other health concerns    Hyperlipidemia, BPH, COPD, DVT , [place]. Acid reflux and Arthritis.       Medications:   Medications:    hydrocortisone sodium succinate PF  100 mg Intravenous Q8H      Infusions:    sodium chloride       PRN Meds:      Current Facility-Administered Medications:     0.9 % sodium chloride infusion, , Intravenous, Continuous, Isaias Kumar MD    hydrocortisone sodium succinate PF (SOLU-CORTEF) injection 100 mg, 100 mg, Intravenous, Q8H, Kayla Couch MD    Current Outpatient Medications:     lisinopril (PRINIVIL;ZESTRIL) 20 MG tablet, Take 1 tablet by mouth daily, Disp: 90 tablet, Rfl: 1    celecoxib (CELEBREX) 400 MG capsule, TAKE ONE CAPSULE BY MOUTH TWICE A DAY, Disp: 60 capsule, Rfl: 5    timolol (TIMOPTIC) 0.5 % ophthalmic solution, , Disp: , Rfl:     finasteride (PROSCAR) 5 MG tablet, Take 1 tablet by mouth daily, Disp: 90 tablet, Rfl: 1    amLODIPine (NORVASC) 5 MG tablet, Take 1 tablet by mouth daily, Disp: 90 tablet, Rfl: 1    allopurinol (ZYLOPRIM) 100 MG tablet, Take 1 tablet by mouth daily, Disp: 90 tablet, Rfl: 1    ADVAIR DISKUS 250-50 MCG/DOSE AEPB, Inhale 1 puff into the lungs 2 times daily, Disp: 3 Inhaler, Rfl: 1    apixaban (ELIQUIS) 5 MG TABS tablet, Take 1 tablet by mouth 2 times daily, Disp: 60 tablet, Rfl: 5    OXYGEN, Inhale 2 L into the lungs nightly (Patient not taking: Reported on 5/26/2021), Disp: , Rfl:     TIMOLOL MALEATE OP, Apply to eye, Disp: , Rfl:     latanoprost (XALATAN) 0.005 % ophthalmic solution, 1 drop nightly, Disp: , Rfl:     azelastine (ASTELIN) 0.1 % nasal spray, 1 spray by Nasal route 2 times daily Use in each nostril as directed, Disp: , Rfl:     hydroxychloroquine (PLAQUENIL) 200 MG tablet, Take 200 mg by mouth daily, Disp: , Rfl:     vitamin B-1 100 MG tablet, Take 1 tablet by mouth daily, Disp: 30 tablet, Rfl: 3    zoster recombinant adjuvanted vaccine (SHINGRIX) 50 MCG/0.5ML SUSR injection, 50 MCG IM then repeat 2-6 months., Disp: 0.5 mL, Rfl: 1    lansoprazole (PREVACID) 30 MG delayed release capsule, Take 30 mg by mouth daily, Disp: , Rfl:     predniSONE (DELTASONE) 5 MG tablet, Take 5 mg by mouth daily , Disp: , Rfl:     History of present illness     Chief Complaint: Dizziness (reports feeling \"lightheaded\" ) and Hypotension      Padmini West is a 79 y.o.  male  With PMH that include Acid reflux, BPH. COPD, Gout and DVT. He  presents with  Complaint of generalized weakness, dizziness and lightheadedness. Patient states this got worse as he came back from [place]. Lab result showed patient has hyponatremia. He has had this in th past. Was been followed up by nephrologist (Dr. Angel Ely). He has a history of elevated troponin. He is being admitted to correct hyponatremia. The nephrologist also ordered some urine studies. Review of Systems     GENERAL:  Denies fever, chills, night sweats, or changes in weight. EYES:  Denies recent visual changes. ENT:  Denies ear pain, hearing loss or tinnitus  RESP:  Denies any cough, dyspnea, or wheezing. CV:  Denies any chest pain with exertion or at rest, palpitations, syncope, or edema.   GI:  Denies any dysphagia, nausea, vomiting, abdominal pain, changes in bowel habit,  MUSCULOSKELETAL:  Endorsed some leg weakness , denies loss of range of motion. NEURO:  Denies any headaches, tremors,  memory loss, confusion, endorsed dizziness and  weakness,   PSYCH:  Denies any sleeping problems, history of abuse,   HEME/LYMPHATIC/IMMUNO:  Denies , bruising, bleeding abnormalities   ENDO:  Denies any heat or cold intolerance,     Objective:   No intake or output data in the 24 hours ending 05/28/21 1830   Vitals:   Vitals:    05/28/21 1443   BP: 126/62   Pulse: 71   Resp: 22   Temp:    SpO2: 93%     Physical Exam:     General :  awake, alert, cooperative, no  Acute  distress  EYES:Lids and lashes normal, pupils equal, round ,extra ocular muscles intact, sclera clear, conjunctiva normal  ENT:  Normocephalic, oral pharynx with moist mucus membranes  NECK:  Supple, symmetrical, trachea midline, no adenopathy,  LUNGS:  Clear to auscultate bilaterally, no rales or  ronchi  noted. CARDIOVASCULAR:  regular rate and rhythm, normal S1 and S2,no murmur noted, peripheral pulses 2+,   ABDOMEN: Normal BS, Non tender, non distended,   MUSCULOSKELETAL:  ROM of all extremities grossly wnl  NEUROLOGIC: AOx 3,  Cranial nerves II-XII are grossly intact. Motor is 5 out of 5 bilaterally. Sensory is intact, no lateralizing findings.    SKIN:  no bruising or bleeding, normal skin color, turgor, no redness,       Past Medical History:      Past Medical History:   Diagnosis Date    Acid reflux     Arthritis     \"Wrists\"    Benign prostatic hyperplasia     without outflow obstruction    BPH (benign prostatic hyperplasia)     [facility]     Dizziness     DVT (deep venous thrombosis) ([facility])     Right Leg In Late 1980's, Right Leg In 2002    Enlarged prostate     Essential hypertension     Glaucoma     Bilateral Eyes    Gout     H/O echocardiogram Limited 11/30/2020    EF 55-60%, Mild TR & PHTN.    [place] (hard of hearing)     Bilateral Hearing Aids    Hx of blood clots Dx Late  And     \"DVT Right Leg\"    Hypertension     Impaired glucose tolerance     Pneumonia 5/15/2018    Slow urinary stream     Syncope     with loss of consciousness    Teeth missing     Upper And Lower    Wears glasses      PSHX:  has a past surgical history that includes eye surgery (Right, ); eye surgery (Left, ); Dental surgery; Colonoscopy (); Inguinal hernia repair (Right, ); Vasectomy (); Abdominal exploration surgery (); Inguinal hernia repair (Right, 2018); and Appendectomy. Allergies: No Known Allergies    FAM HX: family history includes Dementia in his mother; Diabetes type 2  in his father. Soc HX:   Social History     Socioeconomic History    Marital status:      Spouse name: None    Number of children: None    Years of education: None    Highest education level: None   Occupational History    None   Tobacco Use    Smoking status: Former Smoker     Packs/day: 1.00     Years: 29.00     Pack years: 29.00     Types: Cigarettes, Pipe     Start date:      Quit date:      Years since quittin.4    Smokeless tobacco: Never Used   Vaping Use    Vaping Use: Never used   Substance and Sexual Activity    Alcohol use: Yes     Alcohol/week: 2.0 - 3.0 standard drinks     Types: 2 - 3 Glasses of wine per week     Comment: \"2 Glasses Of Wine Daily\"    Drug use: No    Sexual activity: Not Currently   Other Topics Concern    None   Social History Narrative    None     Social Determinants of Health     Financial Resource Strain:     Difficulty of Paying Living Expenses:    Food Insecurity:     Worried About Running Out of Food in the Last Year:     Ran Out of Food in the Last Year:    Transportation Needs:     Lack of Transportation (Medical):      Lack of Transportation (Non-Medical):    Physical Activity:     Days of Exercise per Week:     Minutes of Exercise per Session:    Stress:     Feeling

## 2021-05-28 NOTE — CONSULTS
Pt seen ,examined,interviewed and chart reviewed. Please see the dictated consult for details     Imp :   1. Hyponatremia- hx suggest hypovolemia/ relative adrenal insufficiency  - He  had  had hyponatremia before dating back to 2012 - but all w/u was neg  Including  PET scan - all w/u  neg for malignancy and Na was running well without any tx   2. H/o HTn now low BP - r/o occult infection other process   3. 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 RA with ch pred /BPH etc     Plan:  1. Ua - urinary indices  2. IV stress dose hydrocortisone \  3. R/o any occult infection   4. Arginine level in  am   5. NS at 75 ml/hospital  6. See how he does    7.  Labs in am       Thanks for the consult    #[id number]

## 2021-05-29 LAB
ALBUMIN SERPL-MCNC: 3.7 GM/DL (ref 3.4–5)
ALP BLD-CCNC: 46 IU/L (ref 40–129)
ALT SERPL-CCNC: 13 U/L (ref 10–40)
ANION GAP SERPL CALCULATED.3IONS-SCNC: 12 MMOL/L (ref 4–16)
ANION GAP SERPL CALCULATED.3IONS-SCNC: 16 MMOL/L (ref 4–16)
AST SERPL-CCNC: 15 IU/L (ref 15–37)
BASOPHILS ABSOLUTE: 0 K/CU MM
BASOPHILS RELATIVE PERCENT: 0.1 % (ref 0–1)
BILIRUB SERPL-MCNC: 0.4 MG/DL (ref 0–1)
BUN BLDV-MCNC: 10 MG/DL (ref 6–23)
BUN BLDV-MCNC: 13 MG/DL (ref 6–23)
CALCIUM SERPL-MCNC: 8.6 MG/DL (ref 8.3–10.6)
CALCIUM SERPL-MCNC: 8.9 MG/DL (ref 8.3–10.6)
CHLORIDE BLD-SCNC: 95 MMOL/L (ref 99–110)
CHLORIDE BLD-SCNC: 95 MMOL/L (ref 99–110)
CO2: 16 MMOL/L (ref 21–32)
CO2: 21 MMOL/L (ref 21–32)
CREAT SERPL-MCNC: 0.9 MG/DL (ref 0.9–1.3)
CREAT SERPL-MCNC: 1 MG/DL (ref 0.9–1.3)
DIFFERENTIAL TYPE: ABNORMAL
EKG ATRIAL RATE: 69 BPM
EKG DIAGNOSIS: NORMAL
EKG P AXIS: 29 DEGREES
EKG P-R INTERVAL: 184 MS
EKG Q-T INTERVAL: 432 MS
EKG QRS DURATION: 142 MS
EKG QTC CALCULATION (BAZETT): 462 MS
EKG R AXIS: 71 DEGREES
EKG T AXIS: 5 DEGREES
EKG VENTRICULAR RATE: 69 BPM
EOSINOPHILS ABSOLUTE: 0 K/CU MM
EOSINOPHILS RELATIVE PERCENT: 0 % (ref 0–3)
GFR AFRICAN AMERICAN: >60 ML/MIN/1.73M2
GFR AFRICAN AMERICAN: >60 ML/MIN/1.73M2
GFR NON-AFRICAN AMERICAN: >60 ML/MIN/1.73M2
GFR NON-AFRICAN AMERICAN: >60 ML/MIN/1.73M2
GLUCOSE BLD-MCNC: 138 MG/DL (ref 70–99)
GLUCOSE BLD-MCNC: 153 MG/DL (ref 70–99)
HCT VFR BLD CALC: 34.9 % (ref 42–52)
HEMOGLOBIN: 11.3 GM/DL (ref 13.5–18)
IMMATURE NEUTROPHIL %: 0.6 % (ref 0–0.43)
LYMPHOCYTES ABSOLUTE: 0.5 K/CU MM
LYMPHOCYTES RELATIVE PERCENT: 6.8 % (ref 24–44)
MAGNESIUM: 1.5 MG/DL (ref 1.8–2.4)
MCH RBC QN AUTO: 31.2 PG (ref 27–31)
MCHC RBC AUTO-ENTMCNC: 32.4 % (ref 32–36)
MCV RBC AUTO: 96.4 FL (ref 78–100)
MONOCYTES ABSOLUTE: 0.2 K/CU MM
MONOCYTES RELATIVE PERCENT: 2.2 % (ref 0–4)
NUCLEATED RBC %: 0 %
PDW BLD-RTO: 13.6 % (ref 11.7–14.9)
PHOSPHORUS: 3.4 MG/DL (ref 2.5–4.9)
PLATELET # BLD: 262 K/CU MM (ref 140–440)
PMV BLD AUTO: 10 FL (ref 7.5–11.1)
POTASSIUM SERPL-SCNC: 4.2 MMOL/L (ref 3.5–5.1)
POTASSIUM SERPL-SCNC: 4.6 MMOL/L (ref 3.5–5.1)
RBC # BLD: 3.62 M/CU MM (ref 4.6–6.2)
SEGMENTED NEUTROPHILS ABSOLUTE COUNT: 6.3 K/CU MM
SEGMENTED NEUTROPHILS RELATIVE PERCENT: 90.3 % (ref 36–66)
SODIUM BLD-SCNC: 127 MMOL/L (ref 135–145)
SODIUM BLD-SCNC: 128 MMOL/L (ref 135–145)
TOTAL IMMATURE NEUTOROPHIL: 0.04 K/CU MM
TOTAL NUCLEATED RBC: 0 K/CU MM
TOTAL PROTEIN: 7 GM/DL (ref 6.4–8.2)
WBC # BLD: 6.9 K/CU MM (ref 4–10.5)

## 2021-05-29 PROCEDURE — 36415 COLL VENOUS BLD VENIPUNCTURE: CPT

## 2021-05-29 PROCEDURE — 2580000003 HC RX 258: Performed by: NURSE PRACTITIONER

## 2021-05-29 PROCEDURE — 84100 ASSAY OF PHOSPHORUS: CPT

## 2021-05-29 PROCEDURE — 94761 N-INVAS EAR/PLS OXIMETRY MLT: CPT

## 2021-05-29 PROCEDURE — 94640 AIRWAY INHALATION TREATMENT: CPT

## 2021-05-29 PROCEDURE — 84588 ASSAY OF VASOPRESSIN: CPT

## 2021-05-29 PROCEDURE — 83735 ASSAY OF MAGNESIUM: CPT

## 2021-05-29 PROCEDURE — 80048 BASIC METABOLIC PNL TOTAL CA: CPT

## 2021-05-29 PROCEDURE — 6370000000 HC RX 637 (ALT 250 FOR IP): Performed by: NURSE PRACTITIONER

## 2021-05-29 PROCEDURE — 80053 COMPREHEN METABOLIC PANEL: CPT

## 2021-05-29 PROCEDURE — 6360000002 HC RX W HCPCS: Performed by: INTERNAL MEDICINE

## 2021-05-29 PROCEDURE — 93010 ELECTROCARDIOGRAM REPORT: CPT | Performed by: INTERNAL MEDICINE

## 2021-05-29 PROCEDURE — 85025 COMPLETE CBC W/AUTO DIFF WBC: CPT

## 2021-05-29 PROCEDURE — 2140000000 HC CCU INTERMEDIATE R&B

## 2021-05-29 RX ORDER — MAGNESIUM SULFATE 1 G/100ML
1000 INJECTION INTRAVENOUS ONCE
Status: COMPLETED | OUTPATIENT
Start: 2021-05-29 | End: 2021-05-29

## 2021-05-29 RX ORDER — PREDNISONE 1 MG/1
5 TABLET ORAL DAILY
Status: DISCONTINUED | OUTPATIENT
Start: 2021-05-30 | End: 2021-05-31 | Stop reason: HOSPADM

## 2021-05-29 RX ADMIN — MAGNESIUM SULFATE HEPTAHYDRATE 1000 MG: 1 INJECTION, SOLUTION INTRAVENOUS at 11:32

## 2021-05-29 RX ADMIN — BUDESONIDE AND FORMOTEROL FUMARATE DIHYDRATE 2 PUFF: 160; 4.5 AEROSOL RESPIRATORY (INHALATION) at 08:13

## 2021-05-29 RX ADMIN — HYDROCORTISONE SODIUM SUCCINATE 100 MG: 100 INJECTION, POWDER, FOR SOLUTION INTRAMUSCULAR; INTRAVENOUS at 11:32

## 2021-05-29 RX ADMIN — Medication 100 MG: at 08:38

## 2021-05-29 RX ADMIN — FINASTERIDE 5 MG: 5 TABLET, FILM COATED ORAL at 08:37

## 2021-05-29 RX ADMIN — SODIUM CHLORIDE, PRESERVATIVE FREE 10 ML: 5 INJECTION INTRAVENOUS at 21:11

## 2021-05-29 RX ADMIN — ALLOPURINOL 100 MG: 100 TABLET ORAL at 08:37

## 2021-05-29 RX ADMIN — AZELASTINE HYDROCHLORIDE 1 SPRAY: 137 SPRAY, METERED NASAL at 08:41

## 2021-05-29 RX ADMIN — APIXABAN 5 MG: 5 TABLET, FILM COATED ORAL at 21:10

## 2021-05-29 RX ADMIN — LATANOPROST 1 DROP: 50 SOLUTION/ DROPS OPHTHALMIC at 21:10

## 2021-05-29 RX ADMIN — APIXABAN 5 MG: 5 TABLET, FILM COATED ORAL at 08:37

## 2021-05-29 RX ADMIN — HYDROCORTISONE SODIUM SUCCINATE 100 MG: 100 INJECTION, POWDER, FOR SOLUTION INTRAMUSCULAR; INTRAVENOUS at 02:08

## 2021-05-29 RX ADMIN — TIMOLOL MALEATE 1 DROP: 5 SOLUTION OPHTHALMIC at 11:28

## 2021-05-29 RX ADMIN — HYDROCORTISONE SODIUM SUCCINATE 100 MG: 100 INJECTION, POWDER, FOR SOLUTION INTRAMUSCULAR; INTRAVENOUS at 19:23

## 2021-05-29 RX ADMIN — AZELASTINE HYDROCHLORIDE 1 SPRAY: 137 SPRAY, METERED NASAL at 21:10

## 2021-05-29 RX ADMIN — PANTOPRAZOLE SODIUM 40 MG: 40 TABLET, DELAYED RELEASE ORAL at 06:57

## 2021-05-29 RX ADMIN — HYDROXYCHLOROQUINE SULFATE 200 MG: 200 TABLET, FILM COATED ORAL at 08:42

## 2021-05-29 ASSESSMENT — PAIN SCALES - WONG BAKER
WONGBAKER_NUMERICALRESPONSE: 0

## 2021-05-29 ASSESSMENT — PAIN SCALES - GENERAL
PAINLEVEL_OUTOF10: 0

## 2021-05-29 NOTE — CONSULTS
[address], [address] [facility]                                  CONSULTATION    PATIENT NAME: Elodia Olmos                   :        1940  MED REC NO:   [id number]                          ROOM:       3042  ACCOUNT NO:   [de-identified]                           ADMIT DATE: 2021  PROVIDER:     Marietta Trivedi MD    CONSULT DATE:  2021    CONSULT REQUESTED BY:  Dr. Juliana Johnson. REASON FOR CONSULT:  Acute hyponatremia with some symptoms. BRIEF HISTORY:  The patient is an 79-year-old male who presented to the  emergency room, actually drove by his wife with dizziness, hypertension,  fatigue, tiredness, and not feeling very well since their return from  [place] trip. According to the patient and his wife, they went to  [place] for two weeks, returned on last Tuesday. He was not doing  very well, but doing okay all things considered. They went to [facility], met their children, and had fun. He actually tripped off during  taking gas injuring his left leg, so that wife was driving to the  airport. They flew back, but since then he was not doing very well. He  had two to three days of profuse diarrhea followed by fatigue and  tiredness, which led to the sodium check as they recall that were the  symptoms and the sodium was low on . His sodium came back to 126. When I saw the result, I did call them and talked to them this morning. I got all the history and I thought maybe if we can manage the symptom  with oral hydration, good protein diet, and I can redo the lab on  Monday. Obviously, he did not feel very well and came to the emergency  room. That is the best of the history. But his main history going back to 2020 when I first met him. At  that time, his sodium was 116 and I found out that he has had  intermittent hyponatremia dating back to .   He was treated with  normal saline and I had to give him dose of tolvaptan. Since he had a  pulmonary nodule at that time confirmed by CT preceded by chest x-ray, I  really thought he had ectopic ADH, although the ADH level was  undetectable. His sodium was at 125 range. He was sent home only to be  readmitted a few days later, again the sodium was 125, but he had some  symptoms. After reassurance, he was sent home with a plan to see an  oncologist and get a PET/CT, which he did and apparently did not have  any malignancy, but he has another episode of DVT, PE necessitating  admission, looks like thrombolytic therapy, etc.  Although initially he  was on demeclocycline therapy that was discontinued, so his sodium  remained within normal range up until this time, which is in 05/27. I  saw him last time in my office in February. He was doing very well. His creatinine was about 1.3 or so. He was on Celebrex therapy for  rheumatoid arthritis. Of course he could not get it off, but his sodium  was comfortable without any demeclocycline or any of that treatment. PAST MEDICAL HISTORY:  1. Intermittent hyponatremia dating back to 2012. His avelina sodium was  in 09/2020 about 116.  2.  Hypertension. He was on calcium channel blocker. Obviously, his  blood pressure is low now. 3.  Rheumatoid arthritis. He sees Dr. Robert Reyna. He had been on  Plaquenil therapy as well as Celebrex. 4. H/o venous thromboembolism, first time after orthopedic surgery,  felt it to be provoked, which he had another one last year. 5.  One episode of Salmonella typhi-induced colitis. He needed  exploratory laparotomy that happened in 1994 in [place]. 6.  Gastroesophageal reflux disorder. 7.  One episode of acute gout. 8.  Probable COPD. 9. BPH. HABITS:  He quit smoking 40 years ago. Has a roughly 20-pack-year  history. He drinks some alcohol, a little bit of wine, but he was not  doing it lately. No history of illicit drug abuse.     PAST SURGICAL HISTORY:  Exploratory abdominal surgery back in 1994. FAMILY MEDICAL HISTORY:  Only significant for mother having dementia. Nobody has electrode imbalance that he could tell me. SOCIAL HISTORY:  The patient is originally from [place], [place],  but he has been residing here in [place] [address] for the last 24 years or  so. He has been  for the last 44 years. He has two biological  children. 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 He was active, otherwise. He is retired now and  enjoying himself here in town with his wife. As I mentioned, he went to  [place] with his wife and had two-week trip. REVIEW OF SYSTEMS:  Of course, mainly fatigue, tiredness, he fell, had  some bruise. Rest of the review of systems is negative or as in  previous paragraph. Obviously, he also had three days of profuse  diarrhea back in [place]. HOME MEDICATIONS:  According to my record, which as of February of this  year, he is on Eliquis 5 mg twice a day, which is of course for his  thrombophilia; Celebrex 400 mg daily. He is on amlodipine 5 a day,  aspirin 81 mg daily, hydroxychloroquine 200 mg daily. He is also on  lisinopril, B1 supplement, finasteride, allopurinol, Prevacid, and  prednisone 5 mg daily    CURRENT MEDICATIONS:  Here in the hospital _____ in the ER, none of the  medication was restarted. PHYSICAL EXAMINATION:  VITAL SIGNS:  At the time of examination, temperature 98, blood pressure  was low, but it did come back after fluid bolus. He presented with  60s/40s, now is 120s/60s. GENERAL:  The patient has a little bit of fatigue, tiredness, but no  otherwise acute distress. HEAD AND NECK:  Normocephalic atraumatic. EYES:  I do not see any conjunctival pallor. CARDIOVASCULAR:  Seems regular rate and rhythm. RESPIRATORY:  Clear to auscultation. ABDOMEN:  Soft.   EXTREMITIES:  No edema, but there are several bruises, mainly in the right ankle area, there is eschar on top of that. LABORATORY VALUES AND ANCILLARY SERVICES:  Sodium is actually 122 now. It has dropped little bit. ProBNP level is 245. His BUN and creatinine  are 13 and 1.2 respectively. Lactate is 1.6. Chest x-ray is  unremarkable. CBC, slight anemia with hemoglobin of 11.1. His platelet  count and white count are okay. There is no urinalysis or urinary  indices available at this time. IMPRESSION:  An 65-year-old male with hyponatremia. 1.  Hyponatremia, probably with some symptoms. His history suggests  hypovolemia. He has had hyponatremia before dating back to 2012. All  other workup was negative before. It was running fine without any  treatment before. 2.  History of hypertension. Obviously low blood pressure now, still  have to make sure there is no hypovolemia, infectious process, etc.  3.  _____ episode. He is on direct oral anticoagulation. 4.  Rheumatoid arthritis, BPH, etc.    PLAN:  Urinary indices. I will go ahead and give him stress dose of  hydrocortisone, particularly being on prednisone for long time. Get  _____ level in the morning. We will give him a normal saline 75 mL an  hour, see how he does. Rule out any kind of infection, inflammation,  etc., and follow clinically.         Merna Cummings MD    D: 05/28/2021 18:15:16       T: 05/28/2021 18:24:42     [email or web address]  Job#: [id number]     Doc#: [id number]    CC:

## 2021-05-29 NOTE — PLAN OF CARE
Problem: Discharge Planning:  Goal: Discharged to appropriate level of care  Description: Discharged to appropriate level of care  Outcome: Met This Shift     Problem: Pain:  Goal: Pain level will decrease  Description: Pain level will decrease  Outcome: Met This Shift  Goal: Control of acute pain  Description: Control of acute pain  Outcome: Met This Shift  Goal: Control of chronic pain  Description: Control of chronic pain  Outcome: Met This Shift     If patient has improved lab results, may be candidate to discharge with follow-up required with PCP.

## 2021-05-29 NOTE — CARE COORDINATION
CM in to see Pt to discuss discharge planning. Pt from home with his spouse. Pt has no DME and states he is independent with ADL's. Pt denies the need for home care at this time. Pt has insurance, pcp, and can afford medications. Pt denies any needs at this time. CM available if needs arise.

## 2021-05-29 NOTE — PROGRESS NOTES
Nephrology Progress Note  5/29/2021 4:58 PM        Subjective:   Admit Date: 5/28/2021  PCP: Lv Campa MD    Interval History: felling much better     Diet: better     ROS:  No LH   Ambulates     Data:     Current meds:    [START ON 5/30/2021] predniSONE  5 mg Oral Daily    hydrocortisone sodium succinate PF  100 mg Intravenous Q8H    allopurinol  100 mg Oral Daily    apixaban  5 mg Oral BID    azelastine  1 spray Each Nostril BID    finasteride  5 mg Oral Daily    hydroxychloroquine  200 mg Oral Daily    pantoprazole  40 mg Oral QAM AC    latanoprost  1 drop Both Eyes Nightly    timolol  1 drop Both Eyes Daily    thiamine  100 mg Oral Daily    sodium chloride flush  5-40 mL Intravenous 2 times per day    budesonide-formoterol  2 puff Inhalation BID      sodium chloride           I/O last 3 completed shifts:  In: -   Out: 4291 [Urine:1635]    CBC:   Recent Labs     05/28/21  1550   WBC 6.0   HGB 11.1*             Recent Labs     05/28/21  2238 05/29/21  0550 05/29/21  1506   * 127* 128*   K 4.6 4.6 4.2   CL 93* 95* 95*   CO2 18* 16* 21   BUN 11 10 13   CREATININE 0.9 0.9 1.0   GLUCOSE 123* 138* 153*       Lab Results   Component Value Date    CALCIUM 8.9 05/29/2021    PHOS 3.4 05/29/2021       Objective:     Vitals: BP (!) 156/84   Pulse 85   Temp 98.4 °F (36.9 °C) (Oral)   Resp 21   Ht 6' (1.829 m)   Wt 180 lb (81.6 kg)   SpO2 91%   BMI 24.41 kg/m²     General appearance:  No ac distress   HEENT:  No gross conj pallor  Neck:  Supple   Lungs:  No crackles- some rhonchi   Heart:  Seems RRR  Abdomen: soft  Extremities:  No edema , wound close to Rt ankle x 3 o with eschar on top of it and surrounding bruise - good b/l pedal pulses       Problem List :         Impression :     1. hyponatremia - his Urine  sodium was high before NS was started - which makes hypovolemia less,likey (but possible if he had  high salt diet )- timmy with diarrhea also I support he  Had  relative adrenal insufficiency  with ch po pred and recent  \" sickness' ( which will prevent free water clearance  and dilute na ) - but he  Had  > 1 liter NS and eating and drinking and BP better - also had stress dose of steroid - so I expect  his na to increase slowly   2. RA/ HTN / etc   3. Rt ankle / necrotic skin ? Sec to [address] - less likely PAD   4. Low mg replete IV     Recommendation/Plan  :     1. Stop IVF  2. Po pred from tomorrow   3. He will d/w Dr Alice Medina about weaning  pred off   4. Redo BMP at 3 PM   5. Also in am   6.  He is very anxious with his wife about na level - so if tomorrow am . 130 - they will feel better -       Issac Silver MD MD

## 2021-05-29 NOTE — PROGRESS NOTES
Xochilt Borrego MD, Umair Mcarthur                Internal Medicine Hospitalist             Daily Progress  Note   Subjective:     Chief Complaint   Patient presents with    Dizziness     reports feeling \"lightheaded\"     Hypotension     Mr. Cortez Complains of nil, no more lightheadedness. Objective:    BP (!) 147/86   Pulse 104   Temp 97.7 °F (36.5 °C) (Oral)   Resp 19   Ht 6' (1.829 m)   Wt 180 lb (81.6 kg)   SpO2 91%   BMI 24.41 kg/m²      Intake/Output Summary (Last 24 hours) at 5/29/2021 1005  Last data filed at 5/29/2021 0700  Gross per 24 hour   Intake --   Output 1635 ml   Net -1635 ml      Physical Exam:  Heart:  Regular rate and rhythm, normal S1 and S2 in all 4 auscultatory areas. No rubs  Murmurs or gallops heard. Lungs: Mostly clear to auscultation, decreased breath sounds at bases. No wheezes appreciated no crackles heard. Abdomen: Soft, non distended. Bowel sounds appreciated. No obvious liver or spleen enlargement. Non tender, no rebound noted. Extremities: Non tender, no swelling noted, strength 5/5 both legs. CNS: Grossly intact.     Labs:  CBC with Differential:    Lab Results   Component Value Date    WBC 6.0 05/28/2021    RBC 3.55 05/28/2021    HGB 11.1 05/28/2021    HCT 33.3 05/28/2021     05/28/2021    MCV 93.8 05/28/2021    MCH 31.3 05/28/2021    MCHC 33.3 05/28/2021    RDW 13.4 05/28/2021    SEGSPCT 68.9 05/28/2021    LYMPHOPCT 16.1 05/28/2021    MONOPCT 11.4 05/28/2021    BASOPCT 0.7 05/28/2021    MONOSABS 0.7 05/28/2021    LYMPHSABS 1.0 05/28/2021    EOSABS 0.1 05/28/2021    BASOSABS 0.0 05/28/2021    DIFFTYPE AUTOMATED DIFFERENTIAL 05/28/2021     CMP:    Lab Results   Component Value Date     05/29/2021    K 4.6 05/29/2021    CL 95 05/29/2021    CO2 16 05/29/2021    BUN 10 05/29/2021    CREATININE 0.9 05/29/2021    GFRAA >60 05/29/2021    AGRATIO 0.8 05/26/2021    LABGLOM >60 05/29/2021    GLUCOSE 138 05/29/2021    PROT 7.0 05/29/2021    PROT 7.8 09/09/2012

## 2021-05-29 NOTE — PLAN OF CARE
Problem: Health Behavior:  Goal: Amount of time patient spends in regular exercise will increase  Description: Amount of time patient spends in regular exercise will increase  Outcome: Ongoing  Goal: Ability to manage health-related needs will improve  Description: Ability to manage health-related needs will improve  Outcome: Ongoing     Problem: Metabolic:  Goal: Ability to maintain clinical measurements within normal limits will improve  Description: Ability to maintain clinical measurements within normal limits will improve  Outcome: Ongoing  Goal: Complications related to the disease process, condition or treatment will be avoided or minimized  Description: Complications related to the disease process, condition or treatment will be avoided or minimized  Outcome: Ongoing     Problem: Nutritional:  Goal: Maintenance of adequate nutrition will improve  Description: Maintenance of adequate nutrition will improve  Outcome: Ongoing  Goal: Ability to identify appropriate dietary choices will improve  Description: Ability to identify appropriate dietary choices will improve  Outcome: Ongoing     Problem: Skin Integrity:  Goal: Will show no infection signs and symptoms  Description: Will show no infection signs and symptoms  Outcome: Ongoing  Goal: Absence of new skin breakdown  Description: Absence of new skin breakdown  Outcome: Ongoing    Patient has noted wound on lower right ankle that is documented as being present on previous admission(s). Complications to healing unknown.

## 2021-05-30 LAB
ANION GAP SERPL CALCULATED.3IONS-SCNC: 9 MMOL/L (ref 4–16)
BUN BLDV-MCNC: 15 MG/DL (ref 6–23)
CALCIUM SERPL-MCNC: 9.4 MG/DL (ref 8.3–10.6)
CHLORIDE BLD-SCNC: 96 MMOL/L (ref 99–110)
CO2: 26 MMOL/L (ref 21–32)
CREAT SERPL-MCNC: 1.1 MG/DL (ref 0.9–1.3)
GFR AFRICAN AMERICAN: >60 ML/MIN/1.73M2
GFR NON-AFRICAN AMERICAN: >60 ML/MIN/1.73M2
GLUCOSE BLD-MCNC: 114 MG/DL (ref 70–99)
POTASSIUM SERPL-SCNC: 4 MMOL/L (ref 3.5–5.1)
SODIUM BLD-SCNC: 131 MMOL/L (ref 135–145)

## 2021-05-30 PROCEDURE — 80048 BASIC METABOLIC PNL TOTAL CA: CPT

## 2021-05-30 PROCEDURE — 2140000000 HC CCU INTERMEDIATE R&B

## 2021-05-30 PROCEDURE — 6370000000 HC RX 637 (ALT 250 FOR IP): Performed by: INTERNAL MEDICINE

## 2021-05-30 PROCEDURE — 85025 COMPLETE CBC W/AUTO DIFF WBC: CPT

## 2021-05-30 PROCEDURE — 94761 N-INVAS EAR/PLS OXIMETRY MLT: CPT

## 2021-05-30 PROCEDURE — 6370000000 HC RX 637 (ALT 250 FOR IP): Performed by: NURSE PRACTITIONER

## 2021-05-30 PROCEDURE — 94640 AIRWAY INHALATION TREATMENT: CPT

## 2021-05-30 PROCEDURE — 36415 COLL VENOUS BLD VENIPUNCTURE: CPT

## 2021-05-30 PROCEDURE — 2580000003 HC RX 258: Performed by: NURSE PRACTITIONER

## 2021-05-30 RX ADMIN — FINASTERIDE 5 MG: 5 TABLET, FILM COATED ORAL at 09:28

## 2021-05-30 RX ADMIN — Medication 100 MG: at 09:28

## 2021-05-30 RX ADMIN — TIMOLOL MALEATE 1 DROP: 5 SOLUTION OPHTHALMIC at 20:41

## 2021-05-30 RX ADMIN — PREDNISONE 5 MG: 5 TABLET ORAL at 09:28

## 2021-05-30 RX ADMIN — LATANOPROST 1 DROP: 50 SOLUTION/ DROPS OPHTHALMIC at 20:51

## 2021-05-30 RX ADMIN — PANTOPRAZOLE SODIUM 40 MG: 40 TABLET, DELAYED RELEASE ORAL at 09:34

## 2021-05-30 RX ADMIN — AZELASTINE HYDROCHLORIDE 1 SPRAY: 137 SPRAY, METERED NASAL at 20:41

## 2021-05-30 RX ADMIN — APIXABAN 5 MG: 5 TABLET, FILM COATED ORAL at 09:28

## 2021-05-30 RX ADMIN — AZELASTINE HYDROCHLORIDE 1 SPRAY: 137 SPRAY, METERED NASAL at 09:30

## 2021-05-30 RX ADMIN — TIMOLOL MALEATE 1 DROP: 5 SOLUTION OPHTHALMIC at 09:30

## 2021-05-30 RX ADMIN — ALLOPURINOL 100 MG: 100 TABLET ORAL at 09:28

## 2021-05-30 RX ADMIN — HYDROXYCHLOROQUINE SULFATE 200 MG: 200 TABLET, FILM COATED ORAL at 09:33

## 2021-05-30 RX ADMIN — SODIUM CHLORIDE, PRESERVATIVE FREE 10 ML: 5 INJECTION INTRAVENOUS at 09:29

## 2021-05-30 RX ADMIN — SODIUM CHLORIDE, PRESERVATIVE FREE 10 ML: 5 INJECTION INTRAVENOUS at 20:41

## 2021-05-30 RX ADMIN — BUDESONIDE AND FORMOTEROL FUMARATE DIHYDRATE 2 PUFF: 160; 4.5 AEROSOL RESPIRATORY (INHALATION) at 19:15

## 2021-05-30 RX ADMIN — APIXABAN 5 MG: 5 TABLET, FILM COATED ORAL at 20:40

## 2021-05-30 ASSESSMENT — PAIN SCALES - WONG BAKER
WONGBAKER_NUMERICALRESPONSE: 0

## 2021-05-30 ASSESSMENT — PAIN SCALES - GENERAL
PAINLEVEL_OUTOF10: 0
PAINLEVEL_OUTOF10: 0

## 2021-05-30 NOTE — PROGRESS NOTES
Maikel Olsen MD, [address]                Internal Medicine Hospitalist             Daily Progress  Note   Subjective:     Chief Complaint   Patient presents with    Dizziness     reports feeling \"lightheaded\"     Hypotension     Mr. Cortez Complains of flushed face. No more dizzy . Objective:    /78   Pulse 80   Temp 97.7 °F (36.5 °C) (Oral)   Resp 24   Ht 6' (1.829 m)   Wt 180 lb (81.6 kg)   SpO2 93%   BMI 24.41 kg/m²    No intake or output data in the 24 hours ending 05/30/21 0959   Physical Exam:  Heart:  Regular rate and rhythm, normal S1 and S2 in all 4 auscultatory areas. No rubs  Murmurs or gallops heard. Lungs: Mostly clear to auscultation, decreased breath sounds at bases. No wheezes appreciated no crackles heard. Abdomen: Soft, non distended. Bowel sounds appreciated. No obvious liver or spleen enlargement. Non tender, no rebound noted. Extremities: Non tender, no swelling noted, strength 5/5 both legs. CNS: Grossly intact.     Labs:  CBC with Differential:    Lab Results   Component Value Date    WBC 6.9 05/29/2021    RBC 3.62 05/29/2021    HGB 11.3 05/29/2021    HCT 34.9 05/29/2021     05/29/2021    MCV 96.4 05/29/2021    MCH 31.2 05/29/2021    MCHC 32.4 05/29/2021    RDW 13.6 05/29/2021    SEGSPCT 90.3 05/29/2021    LYMPHOPCT 6.8 05/29/2021    MONOPCT 2.2 05/29/2021    BASOPCT 0.1 05/29/2021    MONOSABS 0.2 05/29/2021    LYMPHSABS 0.5 05/29/2021    EOSABS 0.0 05/29/2021    BASOSABS 0.0 05/29/2021    DIFFTYPE AUTOMATED DIFFERENTIAL 05/29/2021     CMP:    Lab Results   Component Value Date     05/29/2021    K 4.2 05/29/2021    CL 95 05/29/2021    CO2 21 05/29/2021    BUN 13 05/29/2021    CREATININE 1.0 05/29/2021    GFRAA >60 05/29/2021    AGRATIO 0.8 05/26/2021    LABGLOM >60 05/29/2021    GLUCOSE 153 05/29/2021    PROT 7.0 05/29/2021    PROT 7.8 09/09/2012    LABALBU 3.7 05/29/2021    CALCIUM 8.9 05/29/2021    BILITOT 0.4 05/29/2021    ALKPHOS 46 05/29/2021    AST

## 2021-05-30 NOTE — PROGRESS NOTES
Comprehensive Nutrition Assessment    Type and Reason for Visit:  Initial, Positive Nutrition Screen, Wound    Nutrition Assessment:  Admitted with hypotension, hyponatremia, diabetic wound, and BNP. Pt currently on Carb Control 5, no added salt diet. At visit, spoke with patient and wife in room. Pt reports eating well dos, 100% of breakfast. Reports UBW of 180-190#. Per chart review, pt has overall stable wt x 1 yr. Denied any taste issues, chew/swallowing issues, N/V at this time. Last BM 5/30 per pt. Agreed to [facility] during stay. Denied any further questions at this time. Noted pt may d/c tomorrow per physician. Pt at low nutrition risk.     Electronically signed by Antwon Arvizu RD, LD on 5/30/21 at 3:37 PM EDT    Contact: [phone]

## 2021-05-30 NOTE — PROGRESS NOTES
Nephrology Progress Note  5/30/2021 8:40 AM        Subjective:   Admit Date: 5/28/2021  PCP: Yoni Wilson MD    Interval History: doing better   Pt see in early am   this is a late entry     Diet: better per pt     ROS:  Unhappy about some \" liquid med \"   Also did not want the am lab   He seems  Little  angry and usual for his usual behavior       Data:     Current meds:    predniSONE  5 mg Oral Daily    hydrocortisone sodium succinate PF  100 mg Intravenous Q8H    allopurinol  100 mg Oral Daily    apixaban  5 mg Oral BID    azelastine  1 spray Each Nostril BID    finasteride  5 mg Oral Daily    hydroxychloroquine  200 mg Oral Daily    pantoprazole  40 mg Oral QAM AC    latanoprost  1 drop Both Eyes Nightly    timolol  1 drop Both Eyes Daily    thiamine  100 mg Oral Daily    sodium chloride flush  5-40 mL Intravenous 2 times per day    budesonide-formoterol  2 puff Inhalation BID      sodium chloride           No intake/output data recorded. CBC:   Recent Labs     05/28/21  1550 05/29/21  0550   WBC 6.0 6.9   HGB 11.1* 11.3*    262          Recent Labs     05/28/21  2238 05/29/21  0550 05/29/21  1506   * 127* 128*   K 4.6 4.6 4.2   CL 93* 95* 95*   CO2 18* 16* 21   BUN 11 10 13   CREATININE 0.9 0.9 1.0   GLUCOSE 123* 138* 153*       Lab Results   Component Value Date    CALCIUM 8.9 05/29/2021    PHOS 3.4 05/29/2021       Objective:     Vitals: /78   Pulse 80   Temp 97.7 °F (36.5 °C) (Oral)   Resp 24   Ht 6' (1.829 m)   Wt 180 lb (81.6 kg)   SpO2 93%   BMI 24.41 kg/m²     General appearance:  No ac distress   HEENT:  No conj pallor  Neck:  supple  Lungs:  No crackles   Heart:  Seems RRR  Abdomen: soft  Extremities:  No edema, skin lesion       Problem List :         Impression :     1. Hyponatremia- relative  volume depletionad relative  adrenal insufficiency was spaced- na was getting better   2. RA/HTN BP better   3. Rt ankle lesion ?  Sec to

## 2021-05-31 VITALS
HEIGHT: 72 IN | HEART RATE: 89 BPM | WEIGHT: 219.9 LBS | DIASTOLIC BLOOD PRESSURE: 82 MMHG | TEMPERATURE: 98.8 F | OXYGEN SATURATION: 92 % | BODY MASS INDEX: 29.78 KG/M2 | RESPIRATION RATE: 23 BRPM | SYSTOLIC BLOOD PRESSURE: 163 MMHG

## 2021-05-31 LAB
ANION GAP SERPL CALCULATED.3IONS-SCNC: 10 MMOL/L (ref 4–16)
BASOPHILS ABSOLUTE: 0 K/CU MM
BASOPHILS RELATIVE PERCENT: 0.5 % (ref 0–1)
BUN BLDV-MCNC: 21 MG/DL (ref 6–23)
CALCIUM SERPL-MCNC: 9.2 MG/DL (ref 8.3–10.6)
CHLORIDE BLD-SCNC: 99 MMOL/L (ref 99–110)
CO2: 24 MMOL/L (ref 21–32)
CREAT SERPL-MCNC: 0.9 MG/DL (ref 0.9–1.3)
DIFFERENTIAL TYPE: ABNORMAL
EOSINOPHILS ABSOLUTE: 0.1 K/CU MM
EOSINOPHILS RELATIVE PERCENT: 1 % (ref 0–3)
GFR AFRICAN AMERICAN: >60 ML/MIN/1.73M2
GFR NON-AFRICAN AMERICAN: >60 ML/MIN/1.73M2
GLUCOSE BLD-MCNC: 92 MG/DL (ref 70–99)
HCT VFR BLD CALC: 34.1 % (ref 42–52)
HEMOGLOBIN: 11.6 GM/DL (ref 13.5–18)
IMMATURE NEUTROPHIL %: 0.2 % (ref 0–0.43)
LYMPHOCYTES ABSOLUTE: 1.7 K/CU MM
LYMPHOCYTES RELATIVE PERCENT: 21.2 % (ref 24–44)
MCH RBC QN AUTO: 32.5 PG (ref 27–31)
MCHC RBC AUTO-ENTMCNC: 34 % (ref 32–36)
MCV RBC AUTO: 95.5 FL (ref 78–100)
MONOCYTES ABSOLUTE: 0.9 K/CU MM
MONOCYTES RELATIVE PERCENT: 11.1 % (ref 0–4)
NUCLEATED RBC %: 0 %
OSMOLALITY URINE: 540 MOSM/KG (ref 50–800)
OSMOLALITY: 261 MOSM/KG (ref 280–303)
PDW BLD-RTO: 13.8 % (ref 11.7–14.9)
PLATELET # BLD: 275 K/CU MM (ref 140–440)
PMV BLD AUTO: 8.9 FL (ref 7.5–11.1)
POTASSIUM SERPL-SCNC: 4 MMOL/L (ref 3.5–5.1)
RBC # BLD: 3.57 M/CU MM (ref 4.6–6.2)
SEGMENTED NEUTROPHILS ABSOLUTE COUNT: 5.3 K/CU MM
SEGMENTED NEUTROPHILS RELATIVE PERCENT: 66 % (ref 36–66)
SODIUM BLD-SCNC: 133 MMOL/L (ref 135–145)
TOTAL IMMATURE NEUTOROPHIL: 0.02 K/CU MM
TOTAL NUCLEATED RBC: 0 K/CU MM
WBC # BLD: 8.1 K/CU MM (ref 4–10.5)

## 2021-05-31 PROCEDURE — 6370000000 HC RX 637 (ALT 250 FOR IP): Performed by: NURSE PRACTITIONER

## 2021-05-31 PROCEDURE — 94761 N-INVAS EAR/PLS OXIMETRY MLT: CPT

## 2021-05-31 PROCEDURE — 80048 BASIC METABOLIC PNL TOTAL CA: CPT

## 2021-05-31 PROCEDURE — 36415 COLL VENOUS BLD VENIPUNCTURE: CPT

## 2021-05-31 PROCEDURE — 6370000000 HC RX 637 (ALT 250 FOR IP): Performed by: INTERNAL MEDICINE

## 2021-05-31 PROCEDURE — 2580000003 HC RX 258: Performed by: NURSE PRACTITIONER

## 2021-05-31 PROCEDURE — 94640 AIRWAY INHALATION TREATMENT: CPT

## 2021-05-31 PROCEDURE — 85025 COMPLETE CBC W/AUTO DIFF WBC: CPT

## 2021-05-31 RX ADMIN — FINASTERIDE 5 MG: 5 TABLET, FILM COATED ORAL at 11:57

## 2021-05-31 RX ADMIN — Medication 100 MG: at 11:57

## 2021-05-31 RX ADMIN — ALLOPURINOL 100 MG: 100 TABLET ORAL at 11:56

## 2021-05-31 RX ADMIN — PANTOPRAZOLE SODIUM 40 MG: 40 TABLET, DELAYED RELEASE ORAL at 05:34

## 2021-05-31 RX ADMIN — TIMOLOL MALEATE 1 DROP: 5 SOLUTION OPHTHALMIC at 11:59

## 2021-05-31 RX ADMIN — SODIUM CHLORIDE, PRESERVATIVE FREE 10 ML: 5 INJECTION INTRAVENOUS at 11:57

## 2021-05-31 RX ADMIN — AZELASTINE HYDROCHLORIDE 1 SPRAY: 137 SPRAY, METERED NASAL at 11:59

## 2021-05-31 RX ADMIN — PREDNISONE 5 MG: 5 TABLET ORAL at 11:57

## 2021-05-31 RX ADMIN — APIXABAN 5 MG: 5 TABLET, FILM COATED ORAL at 11:57

## 2021-05-31 RX ADMIN — BUDESONIDE AND FORMOTEROL FUMARATE DIHYDRATE 2 PUFF: 160; 4.5 AEROSOL RESPIRATORY (INHALATION) at 08:14

## 2021-05-31 RX ADMIN — HYDROXYCHLOROQUINE SULFATE 200 MG: 200 TABLET, FILM COATED ORAL at 11:57

## 2021-05-31 ASSESSMENT — PAIN SCALES - GENERAL
PAINLEVEL_OUTOF10: 0
PAINLEVEL_OUTOF10: 0

## 2021-05-31 ASSESSMENT — PAIN SCALES - WONG BAKER
WONGBAKER_NUMERICALRESPONSE: 0

## 2021-05-31 NOTE — PLAN OF CARE
Problem: Discharge Planning:  Goal: Discharged to appropriate level of care  Description: Discharged to appropriate level of care  Outcome: Ongoing     Problem: Pain:  Goal: Pain level will decrease  Description: Pain level will decrease  Outcome: Ongoing  Goal: Control of acute pain  Description: Control of acute pain  Outcome: Ongoing  Goal: Control of chronic pain  Description: Control of chronic pain  Outcome: Ongoing     Problem: Health Behavior:  Goal: Amount of time patient spends in regular exercise will increase  Description: Amount of time patient spends in regular exercise will increase  Outcome: Ongoing  Goal: Ability to manage health-related needs will improve  Description: Ability to manage health-related needs will improve  Outcome: Ongoing     Problem: Metabolic:  Goal: Ability to maintain clinical measurements within normal limits will improve  Description: Ability to maintain clinical measurements within normal limits will improve  Outcome: Ongoing  Goal: Complications related to the disease process, condition or treatment will be avoided or minimized  Description: Complications related to the disease process, condition or treatment will be avoided or minimized  Outcome: Ongoing     Problem: Nutritional:  Goal: Maintenance of adequate nutrition will improve  Description: Maintenance of adequate nutrition will improve  Outcome: Ongoing  Goal: Ability to identify appropriate dietary choices will improve  Description: Ability to identify appropriate dietary choices will improve  Outcome: Ongoing     Problem: Skin Integrity:  Goal: Will show no infection signs and symptoms  Description: Will show no infection signs and symptoms  Outcome: Ongoing  Goal: Absence of new skin breakdown  Description: Absence of new skin breakdown  Outcome: Ongoing     Problem: Falls - Risk of:  Goal: Will remain free from falls  Description: Will remain free from falls  Outcome: Ongoing  Goal: Absence of physical injury  Description: Absence of physical injury  Outcome: Ongoing

## 2021-05-31 NOTE — PROGRESS NOTES
Nephrology Progress Note  5/31/2021 8:55 AM        Subjective:   Admit Date: 5/28/2021  PCP: Sarah Marlow MD    Interval History: doing well- no overt confusion     Diet: better    ROS:  Alert , awake and oriented  No f/c/R   Na 133 meq/CL  - Rt ankle pain     Data:     Current meds:    predniSONE  5 mg Oral Daily    allopurinol  100 mg Oral Daily    apixaban  5 mg Oral BID    azelastine  1 spray Each Nostril BID    finasteride  5 mg Oral Daily    hydroxychloroquine  200 mg Oral Daily    pantoprazole  40 mg Oral QAM AC    latanoprost  1 drop Both Eyes Nightly    timolol  1 drop Both Eyes Daily    thiamine  100 mg Oral Daily    sodium chloride flush  5-40 mL Intravenous 2 times per day    budesonide-formoterol  2 puff Inhalation BID      sodium chloride           I/O last 3 completed shifts: In: 2050 [P.O.:2050]  Out: -     CBC:   Recent Labs     05/28/21  1550 05/29/21  0550 05/31/21  0749   WBC 6.0 6.9 8.1   HGB 11.1* 11.3* 11.6*    262 275          Recent Labs     05/29/21  1506 05/30/21  1027 05/31/21  0749   * 131* 133*   K 4.2 4.0 4.0   CL 95* 96* 99   CO2 21 26 24   BUN 13 15 21   CREATININE 1.0 1.1 0.9   GLUCOSE 153* 114* 92       Lab Results   Component Value Date    CALCIUM 9.2 05/31/2021    PHOS 3.4 05/29/2021       Objective:     Vitals: BP (!) 155/94   Pulse 72   Temp 98.8 °F (37.1 °C)   Resp 19   Ht 6' (1.829 m)   Wt 219 lb 14.4 oz (99.7 kg)   SpO2 91%   BMI 29.82 kg/m²     General appearance:  No ac distress   HEENT:  No conj pallor   Neck:  supple  Lungs:  CTA  Heart:  Seems RRR  Abdomen: soft  Extremities:  No edema   necrotic lesion next to Rt ankle ch       Problem List :         Impression :     1. hyponatremia getting better likely all from relative hypovolemia and ? Relative adrenal insufficiency - getting better   2. HTN BP better now   3. RA/ ankle lesion ?  Sec to [address] - other process does  ?  vasculitis but worth checking as an  out pt Recommendation/Plan  :     1. He is taking home dose of Pred   2. Ok to take home dose BP med's  3. Ok to d/C   4. I will d/w his Rheumatologist- Dr Marcella Martines about any possibility of vasculitis and perhaps serology but making sure w/U was not saad before   5. He can check BMP in 1 wk  6. F/U with em in 2 wk's   7.  DASH diet still with HTN       Claude Cantor, MD MD

## 2021-05-31 NOTE — DISCHARGE SUMMARY
Discharge Summary Note  Patient ID:  Jose Maria Falcon  [id number]  77 y.o.  1940    Admit date: 5/28/2021    Discharge date and time: 5/31/2021  1:29 PM     Admitting Physician: Marilu Ellis MD     Discharge Physician: Gearline Osgood, MD    Admission Diagnoses:   Hyponatremia [E87.1]  General weakness [R53.1]    Discharge Diagnoses and Hospital Course:   79-year-old male who presented with complaints of generalized weakness, dizziness and hypotension. Found to have hyponatremia. He has not been feeling well since return from [facility]. His sodium on arrival was 122. Hyponatremia likely from hypovolemia, given fluids. His sodium levels were corrected appropriately with the help of nephrology. He is being discharged today after he is feeling well with correcting sodium levels to 133.     Acute severe hypovolemic, hypotonic hyponatremia  Likely related to hypovolemia versus adrenal insufficiency  Improved appropriately with fluids  Low urine osmolality reflecting poor oral intake  Nephrology recommendations appreciated-okay with discharge    Hypotension-improved  Resume home medications on discharge    Rheumatoid arthritis  Continue Plaquenil  To follow-up as an outpatient for possible reducing/stopping prednisone in the future  To follow up with Dr Michael Jaramillo as out patient    History of pulmonary embolism  Resume Eliquis at discharge    Anemia of chronic disorders    Admission Condition: fair    Discharged Condition: stable    Consults: nephrology    Significant Diagnostic Studies:   CBC with Differential:    Lab Results   Component Value Date    WBC 8.1 05/31/2021    RBC 3.57 05/31/2021    HGB 11.6 05/31/2021    HCT 34.1 05/31/2021     05/31/2021    MCV 95.5 05/31/2021    MCH 32.5 05/31/2021    MCHC 34.0 05/31/2021    RDW 13.8 05/31/2021    SEGSPCT 66.0 05/31/2021    LYMPHOPCT 21.2 05/31/2021    MONOPCT 11.1 05/31/2021    BASOPCT 0.5 05/31/2021    MONOSABS 0.9 05/31/2021 LYMPHSABS 1.7 05/31/2021    EOSABS 0.1 05/31/2021    BASOSABS 0.0 05/31/2021    DIFFTYPE AUTOMATED DIFFERENTIAL 05/31/2021     CMP:    Lab Results   Component Value Date     05/31/2021    K 4.0 05/31/2021    CL 99 05/31/2021    CO2 24 05/31/2021    BUN 21 05/31/2021    CREATININE 0.9 05/31/2021    GFRAA >60 05/31/2021    AGRATIO 0.8 05/26/2021    LABGLOM >60 05/31/2021    GLUCOSE 92 05/31/2021    PROT 7.0 05/29/2021    PROT 7.8 09/09/2012    LABALBU 3.7 05/29/2021    CALCIUM 9.2 05/31/2021    BILITOT 0.4 05/29/2021    ALKPHOS 46 05/29/2021    AST 15 05/29/2021    ALT 13 05/29/2021     XR CHEST PORTABLE    Result Date: 5/28/2021  EXAMINATION: ONE XRAY VIEW OF THE CHEST 5/28/2021 4:00 pm COMPARISON: 10/29/2020 HISTORY: ORDERING SYSTEM PROVIDED HISTORY: low oxygen saturations TECHNOLOGIST PROVIDED HISTORY: Reason for exam:->low oxygen saturations Reason for Exam: low oxygen saturations Acuity: Acute Type of Exam: Initial Additional signs and symptoms: na Relevant Medical/Surgical History: copd, hypertension FINDINGS: The lungs are without acute focal process. There is no effusion or pneumothorax. The cardiomediastinal silhouette is stable. The osseous structures are stable. No acute process. Hiatal hernia again noted       Discharge Exam:  Vitals:    05/31/21 1200   BP: (!) 163/82   Pulse: 89   Resp: 23   Temp: 98.8 °F (37.1 °C)   SpO2: 92%   General :  awake, alert, cooperative, no  Acute  distress  EYES:Lids and lashes normal, pupils equal, round ,extra ocular muscles intact, sclera clear, conjunctiva normal  ENT:  Normocephalic, oral pharynx with moist mucus membranes  NECK:  Supple, symmetrical, trachea midline, no adenopathy,  LUNGS:  Clear to auscultate bilaterally, no rales or  ronchi  noted.   CARDIOVASCULAR:  regular rate and rhythm, normal S1 and S2,no murmur noted, peripheral pulses 2+,   ABDOMEN: Normal BS, Non tender, non distended,   MUSCULOSKELETAL:  ROM of all extremities grossly wnl  NEUROLOGIC: AOx 3,  Cranial nerves II-XII are grossly intact. Motor is 5 out of 5 bilaterally. Sensory is intact, no lateralizing findings. SKIN:  no bruising or bleeding, normal skin color, turgor, no redness,     Disposition: home    Patient Instructions:     Discharge Medications:   Geraldine Hill"   Home Medication Instructions PQV:[id number]    Printed on:05/31/21 4611   Medication Information                      ADVAIR DISKUS 250-50 MCG/DOSE AEPB  Inhale 1 puff into the lungs 2 times daily             allopurinol (ZYLOPRIM) 100 MG tablet  Take 1 tablet by mouth daily             amLODIPine (NORVASC) 5 MG tablet  Take 1 tablet by mouth daily             apixaban (ELIQUIS) 5 MG TABS tablet  Take 1 tablet by mouth 2 times daily             azelastine (ASTELIN) 0.1 % nasal spray  1 spray by Nasal route 2 times daily Use in each nostril as directed             finasteride (PROSCAR) 5 MG tablet  Take 1 tablet by mouth daily             hydroxychloroquine (PLAQUENIL) 200 MG tablet  Take 200 mg by mouth daily             lansoprazole (PREVACID) 30 MG delayed release capsule  Take 30 mg by mouth daily             latanoprost (XALATAN) 0.005 % ophthalmic solution  1 drop nightly             lisinopril (PRINIVIL;ZESTRIL) 20 MG tablet  Take 1 tablet by mouth daily             predniSONE (DELTASONE) 5 MG tablet  Take 5 mg by mouth daily              timolol (TIMOPTIC) 0.5 % ophthalmic solution               TIMOLOL MALEATE OP  Apply to eye             vitamin B-1 100 MG tablet  Take 1 tablet by mouth daily             zoster recombinant adjuvanted vaccine (SHINGRIX) 50 MCG/0.5ML SUSR injection  50 MCG IM then repeat 2-6 months.                  Activity: activity as tolerated    Diet: cardiac diet    Wound Care: none needed    Follow-up:  PCP 1-2 weeks  Nephrology 1-2 weeks    Time Spent Doing discharge 33 min  Electronically signed by Dipti Knight MD  on 5/31/21 at 11:33 AM EDT

## 2021-05-31 NOTE — PROGRESS NOTES
Went through discharge instructions with pt and wife. Pt and wife verbalized understanding. No new scripts. Pt to follow up with pcp/dr. Houston Mtz in 1 week. Once wife pulls car around will take pt down in [place] with mask and belongings.

## 2021-06-01 ENCOUNTER — TELEPHONE (OUTPATIENT)
Dept: CARDIOLOGY CLINIC | Age: 81
End: 2021-06-01

## 2021-06-04 ENCOUNTER — HOSPITAL ENCOUNTER (OUTPATIENT)
Dept: ULTRASOUND IMAGING | Age: 81
Discharge: HOME | End: 2021-06-04
Payer: MEDICARE

## 2021-06-04 DIAGNOSIS — S81.801D WOUND OF RIGHT LOWER EXTREMITY, SUBSEQUENT ENCOUNTER: ICD-10-CM

## 2021-06-04 DIAGNOSIS — E08.65 DIABETES MELLITUS DUE TO UNDERLYING CONDITION WITH HYPERGLYCEMIA, UNSPECIFIED WHETHER LONG TERM INSULIN USE (HCC): ICD-10-CM

## 2021-06-04 PROCEDURE — 93925 LOWER EXTREMITY STUDY: CPT

## 2021-06-06 LAB — ARGININE VASOPRESSIN PLASMA: 2 PG/ML (ref 0–6.9)

## 2021-06-09 ENCOUNTER — OFFICE VISIT (OUTPATIENT)
Dept: CARDIOLOGY CLINIC | Age: 81
End: 2021-06-09
Payer: MEDICARE

## 2021-06-09 VITALS
SYSTOLIC BLOOD PRESSURE: 120 MMHG | HEIGHT: 72 IN | BODY MASS INDEX: 25.33 KG/M2 | WEIGHT: 187 LBS | HEART RATE: 88 BPM | DIASTOLIC BLOOD PRESSURE: 72 MMHG

## 2021-06-09 DIAGNOSIS — L97.911 NONHEALING ULCER OF RIGHT LOWER LEG LIMITED TO BREAKDOWN OF SKIN (HCC): Primary | ICD-10-CM

## 2021-06-09 PROCEDURE — G8417 CALC BMI ABV UP PARAM F/U: HCPCS | Performed by: NURSE PRACTITIONER

## 2021-06-09 PROCEDURE — G8427 DOCREV CUR MEDS BY ELIG CLIN: HCPCS | Performed by: NURSE PRACTITIONER

## 2021-06-09 PROCEDURE — 99214 OFFICE O/P EST MOD 30 MIN: CPT | Performed by: NURSE PRACTITIONER

## 2021-06-09 PROCEDURE — 1123F ACP DISCUSS/DSCN MKR DOCD: CPT | Performed by: NURSE PRACTITIONER

## 2021-06-09 PROCEDURE — 1111F DSCHRG MED/CURRENT MED MERGE: CPT | Performed by: NURSE PRACTITIONER

## 2021-06-09 PROCEDURE — 1036F TOBACCO NON-USER: CPT | Performed by: NURSE PRACTITIONER

## 2021-06-09 PROCEDURE — 4040F PNEUMOC VAC/ADMIN/RCVD: CPT | Performed by: NURSE PRACTITIONER

## 2021-06-09 ASSESSMENT — ENCOUNTER SYMPTOMS
NAUSEA: 0
WHEEZING: 0
SORE THROAT: 0
ABDOMINAL PAIN: 0
SHORTNESS OF BREATH: 0
SHORTNESS OF BREATH: 0
COUGH: 0

## 2021-06-09 NOTE — LETTER
Ty Ramirez  1940  [id number]    Have you had any Chest Pain that is not new? - No      Have you had any Shortness of Breath - No      Have you had any dizziness - No      Have you had any palpitations that are not new? - No      Is the patient on any of the following medications -no    Do you have any edema - swelling in No        Vein \"LEG PROBLEM Questionnaire\"  1. Do you have prominent leg veins? No   2. Do you have any skin discoloration? Yes  3. Do you have any healed or active sores? Yes  4. Do you have swelling of the legs? No  5. Do you have a family history of varicose veins? No  6. Does your profession involve pro-longed        standing or heavy lifting? No  7. Have you been fighting overweight problems? No  8. Do you have restless legs? No  9. Do you have any night time cramps? No  10.  Do you have any of the following in your legs:        Aching     When did you have your last labs drawn yes    Do you have a surgery or procedure scheduled in the near future - No

## 2021-06-09 NOTE — PROGRESS NOTES
[facility] [address] [facility],[address]  Phone: [phone]    Fax [phone]    Mari Nathan MD, Jhonny Wolfe MD, Bev Zepeda MD, MD Jud Garcia MD Rose Spanish, MD Corbin Guarneri, MD Toni Hercules, [facility], [facility], [facility], [facility]    CARDIOLOGY  NOTE    2021    Jose Maria Falcon (:  1940) is a 79 y.o. male,Established patient with Dr. Odilon Alatorre, here for evaluation of the following chief complaint(s):  6 Month Follow-Up (pt. denies chest pain, palpitations, SOB, dizziness and edema. Pt. has no upcoming surgeries at this time.) and Other (leg pain, mo, worse at night.)    SUBJECTIVE/OBJECTIVE:    Patient is having swelling and pain at night to his legs to his lower legs. He has a non healing wound to the Lateral left leg. he was told his arterial studies were normal.         Review of Systems   Constitutional: Negative for fatigue and fever. Respiratory: Negative for cough and shortness of breath. Cardiovascular: Positive for leg swelling. Negative for chest pain and palpitations. Musculoskeletal: Negative for arthralgias and gait problem. Restless legs at night with pain   Skin: Positive for wound. Neurological: Negative for dizziness, syncope, weakness, light-headedness and headaches. Vitals:    21 1306   BP: 120/72   Pulse: 88   Weight: 187 lb (84.8 kg)   Height: 6' (1.829 m)       Wt Readings from Last 3 Encounters:   21 187 lb (84.8 kg)   21 219 lb 14.4 oz (99.7 kg)   21 190 lb 11.2 oz (86.5 kg)       BP Readings from Last 3 Encounters:   21 120/72   21 (!) 163/82   21 115/60       Prior to Admission medications    Medication Sig Start Date End Date Taking?  Authorizing Provider   lisinopril (PRINIVIL;ZESTRIL) 20 MG tablet Take 1 tablet by mouth daily 21  Yes Denilson Mcginnis MD

## 2021-06-16 ENCOUNTER — PROCEDURE VISIT (OUTPATIENT)
Dept: CARDIOLOGY CLINIC | Age: 81
End: 2021-06-16
Payer: MEDICARE

## 2021-06-16 DIAGNOSIS — L97.911 NONHEALING ULCER OF RIGHT LOWER LEG LIMITED TO BREAKDOWN OF SKIN (HCC): ICD-10-CM

## 2021-06-16 PROCEDURE — 93970 EXTREMITY STUDY: CPT | Performed by: INTERNAL MEDICINE

## 2021-06-18 ENCOUNTER — TELEPHONE (OUTPATIENT)
Dept: CARDIOLOGY CLINIC | Age: 81
End: 2021-06-18

## 2021-06-18 DIAGNOSIS — L97.911 NONHEALING ULCER OF RIGHT LOWER LEG LIMITED TO BREAKDOWN OF SKIN (HCC): Primary | ICD-10-CM

## 2021-06-18 DIAGNOSIS — I87.2 VENOUS INSUFFICIENCY: ICD-10-CM

## 2021-06-21 ENCOUNTER — TELEPHONE (OUTPATIENT)
Dept: CARDIOLOGY CLINIC | Age: 81
End: 2021-06-21

## 2021-06-21 NOTE — TELEPHONE ENCOUNTER
Spoke with patient set up appointment with Dr Joyce Kunz for July 27. Told the patient to wear compression socks he verbalized understanding.

## 2021-06-28 ENCOUNTER — TELEPHONE (OUTPATIENT)
Dept: CARDIOLOGY CLINIC | Age: 81
End: 2021-06-28

## 2021-06-28 PROBLEM — L97.911 NONHEALING ULCER OF RIGHT LOWER LEG LIMITED TO BREAKDOWN OF SKIN (HCC): Status: ACTIVE | Noted: 2021-06-28

## 2021-06-28 NOTE — TELEPHONE ENCOUNTER
Attempted to call patient back no answer. Left message asking if patient was currently wearing Compression stockings and if legs pain is that bad Dr Brenton Coulter would like to see patient this week. Asked to call me back when available.  Dr Brenton Coulter would like for him to keep consult with Dr Bautista Harding on 7/27/21

## 2021-06-28 NOTE — TELEPHONE ENCOUNTER
Pt call and states his leg pain is really bad. He wants to be referred to someone die to can not see Sophia until 7/26. Please advise.

## 2021-06-29 NOTE — TELEPHONE ENCOUNTER
Patient returned call to GENIE Molina registrar advised that he did not need anything from us at this time. Patient went to ER at [facility] for leg pain.

## 2021-09-09 ENCOUNTER — TELEPHONE (OUTPATIENT)
Dept: CARDIOLOGY CLINIC | Age: 81
End: 2021-09-09

## 2021-09-09 NOTE — TELEPHONE ENCOUNTER
Cardiologist: Dr. Rosa Castro  Surgeon: Dr. Thai Mendez  Surgery: Rt leg endovenous laser ablation of  vein  Anesthesia: IV Sedation  Date: 10/7/2021  FAX# [phone]  # [phone]    Last OV 6/9/2021 w/Tatiana    1. Nonhealing ulcer of right lower leg limited to breakdown of skin ([address]  Doppler lower legs venous ordered. Arterial study did not suggest arterial stenosis. Discussed wound center to assist with healing, patient stats that the wound has been here for months. -     VL DUP LOWER EXTREMITY VENOUS BILATERAL; Future         Echo- 10/29/2020   Left ventricular systolic function is normal.   Ejection fraction is visually estimated at 50-55%. Flattened interventricular septum in diastole and systole consistent with   right ventricular volume and pressure overload. Grade I diastolic dysfunction. Moderately dilated right heart. Positive Cohen's sign suggestive of acute PE. Mitral valve prolapse involving the anterior leaflet. Mild to moderate eccentric mitral regurgitation is present. Mild to moderate tricuspid regurgitation is present. RVSP is 67 mmHg. No evidence of any pericardial effusion.       CATH-Peripheral Interventional Report- 10/29/2020      Eliquis

## 2021-10-26 ENCOUNTER — TELEPHONE (OUTPATIENT)
Dept: PHARMACY | Facility: CLINIC | Age: 81
End: 2021-10-26

## 2021-10-27 NOTE — TELEPHONE ENCOUNTER
For [place] in place:  No   Recommendation Provided To: Pharmacy: 1   Intervention Detail: Discontinued Rx: 1, reason: Patient Preference, SE (hypotension)   Gap Closed?: No    Intervention Accepted By: Pharmacy: 1   Time Spent (min): 15

## 2022-02-10 RX ORDER — APIXABAN 5 MG/1
TABLET, FILM COATED ORAL
Qty: 180 TABLET | Refills: 1 | Status: SHIPPED | OUTPATIENT
Start: 2022-02-10

## 2022-08-23 RX ORDER — APIXABAN 5 MG/1
TABLET, FILM COATED ORAL
Qty: 180 TABLET | Refills: 1 | OUTPATIENT
Start: 2022-08-23

## 2022-10-05 ENCOUNTER — OFFICE (OUTPATIENT)
Dept: URBAN - METROPOLITAN AREA CLINIC 16 | Facility: CLINIC | Age: 82
End: 2022-10-05
Payer: MEDICARE

## 2022-10-05 VITALS
WEIGHT: 182 LBS | HEART RATE: 91 BPM | OXYGEN SATURATION: 98 % | DIASTOLIC BLOOD PRESSURE: 58 MMHG | SYSTOLIC BLOOD PRESSURE: 118 MMHG | HEIGHT: 66 IN

## 2022-10-05 DIAGNOSIS — C90.00 MULTIPLE MYELOMA NOT HAVING ACHIEVED REMISSION: ICD-10-CM

## 2022-10-05 DIAGNOSIS — M54.9 DORSALGIA, UNSPECIFIED: ICD-10-CM

## 2022-10-05 DIAGNOSIS — R19.4 CHANGE IN BOWEL HABIT: ICD-10-CM

## 2022-10-05 PROCEDURE — 99204 OFFICE O/P NEW MOD 45 MIN: CPT | Performed by: INTERNAL MEDICINE

## 2022-12-12 ENCOUNTER — TELEPHONE (OUTPATIENT)
Dept: FAMILY MEDICINE CLINIC | Age: 82
End: 2022-12-12